# Patient Record
Sex: MALE | Race: WHITE | Employment: UNEMPLOYED | URBAN - METROPOLITAN AREA
[De-identification: names, ages, dates, MRNs, and addresses within clinical notes are randomized per-mention and may not be internally consistent; named-entity substitution may affect disease eponyms.]

---

## 2017-03-21 ENCOUNTER — OFFICE VISIT (OUTPATIENT)
Dept: FAMILY MEDICINE CLINIC | Age: 42
End: 2017-03-21
Payer: MEDICARE

## 2017-03-21 VITALS
SYSTOLIC BLOOD PRESSURE: 121 MMHG | TEMPERATURE: 98.2 F | BODY MASS INDEX: 40.43 KG/M2 | HEIGHT: 74 IN | WEIGHT: 315 LBS | OXYGEN SATURATION: 98 % | RESPIRATION RATE: 17 BRPM | HEART RATE: 88 BPM | DIASTOLIC BLOOD PRESSURE: 80 MMHG

## 2017-03-21 DIAGNOSIS — J01.90 ACUTE NON-RECURRENT SINUSITIS, UNSPECIFIED LOCATION: Primary | ICD-10-CM

## 2017-03-21 DIAGNOSIS — R05.9 COUGH: ICD-10-CM

## 2017-03-21 PROCEDURE — G8427 DOCREV CUR MEDS BY ELIG CLIN: HCPCS | Performed by: NURSE PRACTITIONER

## 2017-03-21 PROCEDURE — 4004F PT TOBACCO SCREEN RCVD TLK: CPT | Performed by: NURSE PRACTITIONER

## 2017-03-21 PROCEDURE — G8417 CALC BMI ABV UP PARAM F/U: HCPCS | Performed by: NURSE PRACTITIONER

## 2017-03-21 PROCEDURE — 99214 OFFICE O/P EST MOD 30 MIN: CPT | Performed by: NURSE PRACTITIONER

## 2017-03-21 PROCEDURE — G8484 FLU IMMUNIZE NO ADMIN: HCPCS | Performed by: NURSE PRACTITIONER

## 2017-03-21 RX ORDER — BENZONATATE 100 MG/1
100 CAPSULE ORAL 3 TIMES DAILY PRN
Qty: 30 CAPSULE | Refills: 1 | Status: SHIPPED | OUTPATIENT
Start: 2017-03-21 | End: 2017-03-28

## 2017-03-21 RX ORDER — AMOXICILLIN AND CLAVULANATE POTASSIUM 875; 125 MG/1; MG/1
1 TABLET, FILM COATED ORAL 2 TIMES DAILY
Qty: 20 TABLET | Refills: 0 | Status: SHIPPED | OUTPATIENT
Start: 2017-03-21 | End: 2017-03-31

## 2017-03-21 ASSESSMENT — ENCOUNTER SYMPTOMS
SHORTNESS OF BREATH: 0
SORE THROAT: 0
CHEST TIGHTNESS: 0
VOICE CHANGE: 0
EYE DISCHARGE: 0
WHEEZING: 0
EYE REDNESS: 0
SINUS PRESSURE: 1
COUGH: 1

## 2017-03-27 ENCOUNTER — OFFICE VISIT (OUTPATIENT)
Dept: FAMILY MEDICINE CLINIC | Age: 42
End: 2017-03-27
Payer: MEDICARE

## 2017-03-27 VITALS
DIASTOLIC BLOOD PRESSURE: 84 MMHG | TEMPERATURE: 97 F | WEIGHT: 315 LBS | SYSTOLIC BLOOD PRESSURE: 125 MMHG | OXYGEN SATURATION: 98 % | HEART RATE: 91 BPM | HEIGHT: 74 IN | BODY MASS INDEX: 40.43 KG/M2

## 2017-03-27 DIAGNOSIS — E66.01 MORBID OBESITY DUE TO EXCESS CALORIES (HCC): Primary | ICD-10-CM

## 2017-03-27 DIAGNOSIS — F32.A DEPRESSION, UNSPECIFIED DEPRESSION TYPE: ICD-10-CM

## 2017-03-27 DIAGNOSIS — N39.43 DRIBBLING URINE: ICD-10-CM

## 2017-03-27 PROCEDURE — 4004F PT TOBACCO SCREEN RCVD TLK: CPT | Performed by: NURSE PRACTITIONER

## 2017-03-27 PROCEDURE — G8484 FLU IMMUNIZE NO ADMIN: HCPCS | Performed by: NURSE PRACTITIONER

## 2017-03-27 PROCEDURE — G8427 DOCREV CUR MEDS BY ELIG CLIN: HCPCS | Performed by: NURSE PRACTITIONER

## 2017-03-27 PROCEDURE — G8417 CALC BMI ABV UP PARAM F/U: HCPCS | Performed by: NURSE PRACTITIONER

## 2017-03-27 PROCEDURE — 99213 OFFICE O/P EST LOW 20 MIN: CPT | Performed by: NURSE PRACTITIONER

## 2017-03-27 RX ORDER — TAMSULOSIN HYDROCHLORIDE 0.4 MG/1
0.4 CAPSULE ORAL DAILY
Qty: 30 CAPSULE | Refills: 3 | Status: SHIPPED | OUTPATIENT
Start: 2017-03-27 | End: 2020-08-31

## 2017-03-27 RX ORDER — ARIPIPRAZOLE 15 MG/1
TABLET ORAL
COMMUNITY
Start: 2017-03-03 | End: 2020-09-25

## 2017-03-27 ASSESSMENT — ENCOUNTER SYMPTOMS
NAUSEA: 0
SHORTNESS OF BREATH: 0
COLOR CHANGE: 0
COUGH: 0
ABDOMINAL PAIN: 0
CHEST TIGHTNESS: 0
VOMITING: 0

## 2017-06-19 ENCOUNTER — OFFICE VISIT (OUTPATIENT)
Dept: FAMILY MEDICINE CLINIC | Age: 42
End: 2017-06-19
Payer: MEDICARE

## 2017-06-19 VITALS
HEART RATE: 89 BPM | WEIGHT: 315 LBS | DIASTOLIC BLOOD PRESSURE: 86 MMHG | SYSTOLIC BLOOD PRESSURE: 128 MMHG | TEMPERATURE: 98.3 F | HEIGHT: 74 IN | OXYGEN SATURATION: 98 % | BODY MASS INDEX: 40.43 KG/M2

## 2017-06-19 DIAGNOSIS — E66.01 MORBID OBESITY DUE TO EXCESS CALORIES (HCC): ICD-10-CM

## 2017-06-19 DIAGNOSIS — J20.9 ACUTE BRONCHITIS, UNSPECIFIED ORGANISM: Primary | ICD-10-CM

## 2017-06-19 PROCEDURE — G8427 DOCREV CUR MEDS BY ELIG CLIN: HCPCS | Performed by: NURSE PRACTITIONER

## 2017-06-19 PROCEDURE — 4004F PT TOBACCO SCREEN RCVD TLK: CPT | Performed by: NURSE PRACTITIONER

## 2017-06-19 PROCEDURE — G8417 CALC BMI ABV UP PARAM F/U: HCPCS | Performed by: NURSE PRACTITIONER

## 2017-06-19 PROCEDURE — 99213 OFFICE O/P EST LOW 20 MIN: CPT | Performed by: NURSE PRACTITIONER

## 2017-06-19 RX ORDER — ALBUTEROL SULFATE 90 UG/1
2 AEROSOL, METERED RESPIRATORY (INHALATION) EVERY 6 HOURS PRN
Qty: 1 INHALER | Refills: 3 | Status: SHIPPED | OUTPATIENT
Start: 2017-06-19 | End: 2020-08-31

## 2017-06-19 RX ORDER — METHYLPREDNISOLONE 4 MG/1
TABLET ORAL
Qty: 1 KIT | Refills: 0 | Status: SHIPPED | OUTPATIENT
Start: 2017-06-19 | End: 2020-08-31

## 2017-06-19 ASSESSMENT — ENCOUNTER SYMPTOMS
TROUBLE SWALLOWING: 0
SINUS PRESSURE: 0
SHORTNESS OF BREATH: 1
RHINORRHEA: 0
SORE THROAT: 0
COUGH: 1
WHEEZING: 1
ABDOMINAL PAIN: 0
VOICE CHANGE: 1
CHEST TIGHTNESS: 1

## 2018-08-23 PROBLEM — R45.850 HOMICIDAL IDEATION: Status: ACTIVE | Noted: 2018-08-22

## 2020-08-31 ENCOUNTER — HOSPITAL ENCOUNTER (OUTPATIENT)
Age: 45
Setting detail: SPECIMEN
Discharge: HOME | End: 2020-08-31
Payer: MEDICARE

## 2020-08-31 ENCOUNTER — OFFICE VISIT (OUTPATIENT)
Dept: FAMILY MEDICINE CLINIC | Age: 45
End: 2020-08-31
Payer: MEDICARE

## 2020-08-31 VITALS
HEIGHT: 74 IN | OXYGEN SATURATION: 93 % | WEIGHT: 315 LBS | BODY MASS INDEX: 40.43 KG/M2 | HEART RATE: 93 BPM | TEMPERATURE: 97 F

## 2020-08-31 PROCEDURE — G8427 DOCREV CUR MEDS BY ELIG CLIN: HCPCS | Performed by: NURSE PRACTITIONER

## 2020-08-31 PROCEDURE — G8417 CALC BMI ABV UP PARAM F/U: HCPCS | Performed by: NURSE PRACTITIONER

## 2020-08-31 PROCEDURE — 99202 OFFICE O/P NEW SF 15 MIN: CPT | Performed by: NURSE PRACTITIONER

## 2020-08-31 PROCEDURE — 4004F PT TOBACCO SCREEN RCVD TLK: CPT | Performed by: NURSE PRACTITIONER

## 2020-08-31 ASSESSMENT — PATIENT HEALTH QUESTIONNAIRE - PHQ9
SUM OF ALL RESPONSES TO PHQ QUESTIONS 1-9: 0
1. LITTLE INTEREST OR PLEASURE IN DOING THINGS: 0
2. FEELING DOWN, DEPRESSED OR HOPELESS: 0
SUM OF ALL RESPONSES TO PHQ9 QUESTIONS 1 & 2: 0
SUM OF ALL RESPONSES TO PHQ QUESTIONS 1-9: 0

## 2020-08-31 ASSESSMENT — ENCOUNTER SYMPTOMS
EYE PAIN: 0
SORE THROAT: 0
NAUSEA: 0
ABDOMINAL PAIN: 0
DIARRHEA: 0
BACK PAIN: 0
SHORTNESS OF BREATH: 0
VOMITING: 0
COUGH: 0
SINUS PAIN: 0

## 2020-08-31 NOTE — PROGRESS NOTES
of breath. Cardiovascular: Negative for chest pain and palpitations. Gastrointestinal: Negative for abdominal pain, diarrhea, nausea and vomiting. Genitourinary: Negative for penile pain and testicular pain. Musculoskeletal: Positive for myalgias. Negative for back pain, joint swelling and neck pain. Skin: Negative for rash. Neurological: Negative for dizziness and light-headedness. Hematological: Does not bruise/bleed easily. All other systems reviewed and are negative. Objective:     Physical Exam  Vitals signs and nursing note reviewed. Constitutional:       General: He is not in acute distress. Appearance: Normal appearance. He is not toxic-appearing. HENT:      Nose: Nose normal.      Mouth/Throat:      Mouth: Mucous membranes are moist.      Pharynx: Oropharynx is clear. Cardiovascular:      Rate and Rhythm: Normal rate. Pulmonary:      Effort: Pulmonary effort is normal. No respiratory distress. Breath sounds: Normal breath sounds. Skin:     General: Skin is warm and dry. Neurological:      General: No focal deficit present. Mental Status: He is alert and oriented to person, place, and time. Pulse 93   Temp 97 °F (36.1 °C)   Ht 6' 2\" (1.88 m)   Wt (!) 343 lb (155.6 kg)   SpO2 93%   BMI 44.04 kg/m²   Lab Review   No visits with results within 2 Month(s) from this visit.    Latest known visit with results is:   Hospital Outpatient Visit on 10/22/2011   Component Date Value    Albumin 10/22/2011 4.7     Albumin/Globulin Ratio 10/22/2011 2.1     Alkaline Phosphatase 10/22/2011 75     ALT 10/22/2011 27     AST 10/22/2011 22     Total Bilirubin 10/22/2011 0.42     BUN 10/22/2011 10     Calcium 10/22/2011 9.9     Creatinine 10/22/2011 0.92     Glucose 10/22/2011 95     Protein, Total 10/22/2011 6.9     Sodium 10/22/2011 142     Potassium 10/22/2011 4.4     Chloride 10/22/2011 108     CO2 10/22/2011 32*    Anion Gap 10/22/2011 6*    GFR Non- 10/22/2011 >60     GFR  10/22/2011 >60     GFR Comment 10/22/2011          GFR Staging 10/22/2011          Cholesterol 10/22/2011 192     HDL 10/22/2011 35*    LDL Cholesterol 10/22/2011 80     Chol/HDL Ratio 10/22/2011 5.5*    Triglycerides 10/22/2011 383*    VLDL 10/22/2011 NOT REPORTED     TSH 10/22/2011 2.66        Assessment:       Diagnosis Orders   1. Close exposure to COVID-19 virus  COVID-19 Ambulatory       Plan:      Return if symptoms worsen or fail to improve. No orders of the defined types were placed in this encounter. Recommend isolation pending covid results. Discussed treatment regimen to include rest, hydration, tylenol prn. Discussed deep breathing exercises. Discussed to monitor for progression of symptoms. Follow up as needed. Will contact patient for results       Patient given educational materials - see patient instructions. Discussed use, benefit, and side effects of prescribed medications. All patientquestions answered. Pt voiced understanding. This note was transcribed using dictation with Dragon services. Efforts were made to correct any errors but some words may be misinterpreted.      Electronically signed by AMY Jo CNP on 8/31/2020at 10:17 AM

## 2020-09-03 LAB — SARS-COV-2, NAA: NOT DETECTED

## 2020-09-25 ENCOUNTER — OFFICE VISIT (OUTPATIENT)
Dept: FAMILY MEDICINE CLINIC | Age: 45
End: 2020-09-25
Payer: COMMERCIAL

## 2020-09-25 VITALS
WEIGHT: 315 LBS | OXYGEN SATURATION: 93 % | SYSTOLIC BLOOD PRESSURE: 122 MMHG | TEMPERATURE: 97.2 F | BODY MASS INDEX: 40.43 KG/M2 | HEART RATE: 95 BPM | DIASTOLIC BLOOD PRESSURE: 70 MMHG | HEIGHT: 74 IN

## 2020-09-25 PROBLEM — F20.9 SCHIZOPHRENIA (HCC): Status: ACTIVE | Noted: 2018-08-24

## 2020-09-25 PROCEDURE — 99204 OFFICE O/P NEW MOD 45 MIN: CPT | Performed by: NURSE PRACTITIONER

## 2020-09-25 RX ORDER — M-VIT,TX,IRON,MINS/CALC/FOLIC 27MG-0.4MG
1 TABLET ORAL DAILY
COMMUNITY
End: 2022-05-25 | Stop reason: SDUPTHER

## 2020-09-25 RX ORDER — ARIPIPRAZOLE 5 MG/1
5 TABLET ORAL DAILY
COMMUNITY

## 2020-09-25 SDOH — ECONOMIC STABILITY: INCOME INSECURITY: HOW HARD IS IT FOR YOU TO PAY FOR THE VERY BASICS LIKE FOOD, HOUSING, MEDICAL CARE, AND HEATING?: NOT HARD AT ALL

## 2020-09-25 SDOH — ECONOMIC STABILITY: TRANSPORTATION INSECURITY
IN THE PAST 12 MONTHS, HAS LACK OF TRANSPORTATION KEPT YOU FROM MEETINGS, WORK, OR FROM GETTING THINGS NEEDED FOR DAILY LIVING?: NO

## 2020-09-25 SDOH — ECONOMIC STABILITY: FOOD INSECURITY: WITHIN THE PAST 12 MONTHS, YOU WORRIED THAT YOUR FOOD WOULD RUN OUT BEFORE YOU GOT MONEY TO BUY MORE.: NEVER TRUE

## 2020-09-25 SDOH — ECONOMIC STABILITY: FOOD INSECURITY: WITHIN THE PAST 12 MONTHS, THE FOOD YOU BOUGHT JUST DIDN'T LAST AND YOU DIDN'T HAVE MONEY TO GET MORE.: NEVER TRUE

## 2020-09-25 SDOH — ECONOMIC STABILITY: TRANSPORTATION INSECURITY
IN THE PAST 12 MONTHS, HAS THE LACK OF TRANSPORTATION KEPT YOU FROM MEDICAL APPOINTMENTS OR FROM GETTING MEDICATIONS?: NO

## 2020-09-25 NOTE — PATIENT INSTRUCTIONS
Patient Education        Learning About Low-Carbohydrate Diets  What is a low-carbohydrate diet? A low-carbohydrate (or \"low-carb\") diet limits foods and drinks that have carbohydrates. This includes grains, fruits, milk and yogurt, and starchy vegetables like potatoes, beans, and corn. It also avoids foods and drinks that have added sugar. Instead, low-carb diets include foods that are high in protein and fat. Why might you follow a low-carb diet? Low-carb diets may be used for a variety of reasons, such as for weight loss. People who have diabetes may use a low-carb diet to help manage their blood sugar levels. What should you do before you start the diet? Talk to your doctor before you try any diet. This is even more important if you have health problems like kidney disease, heart disease, or diabetes. Your doctor may suggest that you meet with a registered dietitian. A dietitian can help you make an eating plan that works for you. What foods do you eat on a low-carb diet? On a low-carb diet, you choose foods that are high in protein and fat. Examples of these are:  · Meat, poultry, and fish. · Eggs. · Nuts, such as walnuts, pecans, almonds, and peanuts. · Peanut butter and other nut butters. · Tofu. · Avocado. · Negrita Gary. · Non-starchy vegetables like broccoli, cauliflower, green beans, mushrooms, peppers, lettuce, and spinach. · Unsweetened non-dairy milks like almond milk and coconut milk. · Cheese, cottage cheese, and cream cheese. Current as of: August 22, 2019               Content Version: 12.5  © 1659-2781 [email or web address]. Care instructions adapted under license by [facility] [address] If you have questions about a medical condition or this instruction, always ask your healthcare professional. [place] [address] any warranty or liability for your use of this information.          Patient Education        Stopping Smokeless Tobacco Use: Care Instructions  Your Care Instructions     Smokeless tobacco comes in many forms, such as snuff and chewing tobacco:  · Snuff is finely ground tobacco sold in cans or pouches. Most of the time, snuff is used by putting a \"pinch\" or \"dip\" between the lower lip or cheek and the gum. · Chewing tobacco is sold as loose leaves, plugs, or twists. It is chewed or placed between the cheek and the gum or teeth. There are plenty of reasons to stop using smokeless tobacco. These products are harmful. They are not risk-free alternatives to smoking. Smokeless tobacco contains nicotine, which is addicting. Though using smokeless tobacco is less harmful than smoking cigarettes, it can cause serious health problems, such as:  · White patches or red sores in your mouth that can turn into mouth cancer involving the lip, tongue, or cheek. · Tooth loss and other dental problems. · Gum disease. Your gums may pull away from your teeth and not grow back. People who use smokeless tobacco crave the nicotine in it. Giving up smokeless tobacco is much harder than simply changing a habit. Your body has to stop craving the nicotine. It is hard to quit, but you can do it. Many tools are available for people who want to quit using smokeless tobacco. You may find that combining tools works best for you. There are several steps to quitting. First you get ready to quit. Then you get support to help you. After that, you learn new skills and behaviors to quit. For many people, a necessary step is getting and using medicine. Your doctor will help you set up the plan that best meets your needs. You may want to attend a tobacco cessation program. When you choose a program, look for one that has proven success. Ask your doctor for ideas. You will greatly increase your chances of success if you take medicine as well as get counseling or join a cessation program.  Some of the changes you feel when you first quit smokeless tobacco are uncomfortable.  Your body will miss the nicotine at first, and you may feel short-tempered and grumpy. You may have trouble sleeping or concentrating. Medicine can help you deal with these symptoms. You may struggle with changing your habits and rituals. The last step is the tricky one: Be prepared for the urge to use smokeless tobacco to continue for a time. This is a lot to deal with, but keep at it. You will feel better. Follow-up care is a key part of your treatment and safety. Be sure to make and go to all appointments, and call your doctor if you are having problems. It's also a good idea to know your test results and keep a list of the medicines you take. How can you care for yourself at home? · Ask your family, friends, and coworkers for support. You have a better chance of quitting if you have help and support. · Join a support group for people who are trying to quit using smokeless tobacco.  · Set a quit date. Pick your date carefully so that it is not right in the middle of a big deadline or stressful time. After you quit, do not use smokeless tobacco even once. Get rid of all spit cups, cans, and pouches after your last use. Clean your house and your clothes so that they do not smell of tobacco.  · Learn how to be a non-user. Think about ways you can avoid those things that make you reach for tobacco.  ? Learn some ways to deal with cravings, like calling a friend or going for a walk. Cravings often pass. ? Avoid situations that put you at greatest risk for using smokeless tobacco. For some people, it is hard to spend time with friends without dipping or chewing. For others, they might skip a coffee break with coworkers who smoke or use smokeless tobacco.  ? Change your daily routine. Take a different route to work, or eat a meal in a different place. · Cut down on stress. Calm yourself or release tension by doing an activity you enjoy, such as reading a book, taking a hot bath, or gardening.   · Talk to your doctor or pharmacist about may prevent your tongue from falling toward the back of the throat, which can make a blocked or narrow airway worse. Putting pillows under your head will not help. When should you call for help? Watch closely for changes in your health, and be sure to contact your doctor if:  · You snore, and you feel sleepy during the day. · Your sleeping partner or you notice that you gasp, choke, or stop breathing during sleep. · You do not get better as expected. Where can you learn more? Go to https://[employer].ioeb.[employer] org and sign in to your [employer] account. Enter E218 in the [employer] box to learn more about \"Snoring: Care Instructions. \"     If you do not have an account, please click on the \"Sign Up Now\" link. Current as of: February 24, 2020               Content Version: 12.5  © 0352-8409 [employer]. Care instructions adapted under license by [facility] ([facility]). If you have questions about a medical condition or this instruction, always ask your healthcare professional. [address] any warranty or liability for your use of this information.

## 2020-09-25 NOTE — PROGRESS NOTES
[facility] [id number] [place]   [facility], [address]  [phone]  [phone]    Paloma Carmona is a 53 y.o. male who is here with c/o of:    Chief Complaint: Establish Care      Patient Accompanied by: patient and mother    HPI - Paloma Carmona is here today with c/o:    Patient here to establish care. Not currently  and does not have children. Lives with mother and father. He is unemployed. No routine exercise. He tries to eat healthy but eats fast food and drinks a lot of soda according to his mother. Averages 10-12 hours of sleep per night. His mother says he snores a lot and sometimes she feels that he is losing his breath. He is a current smoker, 1 pk per day since he was 23years old. He has no interest in quitting at this time. He does not consume alcohol. Has history of schizophrenia diagnosed at 23years old. Follows with psych, Dr Giovanny Noriega.        Health Maintenance Due   Topic Date Due    Diabetes screen  09/28/2013    Lipid screen  10/22/2016    Annual Wellness Visit (AWV)  08/31/2020        Patient Active Problem List:     Morbid obesity due to excess calories ([address]     Homicidal ideation     Schizophrenia ([facility] [address]     Past Medical History:   Diagnosis Date    Depression     Hypertriglyceridemia     Obesity       Past Surgical History:   Procedure Laterality Date    TONSILLECTOMY       Family History   Problem Relation Age of Onset    Cancer Mother         breast    Other Father         COPD    Cancer Father         prostate    Diabetes Sister      Social History     Tobacco Use    Smoking status: Current Every Day Smoker     Packs/day: 1.00     Years: 29.00     Pack years: 29.00     Types: Cigarettes    Smokeless tobacco: Never Used   Substance Use Topics    Alcohol use: No     ALLERGIES:    Allergies   Allergen Reactions    Codeine Other (See Comments)     hallucinates           Subjective   Review of Systems   · Constitutional:  Negative for activity change, appetite change,unexpected weight change, chills, fever, and fatigue. Positive weight gain  · HENT: Negative for ear pain, sore throat,  Rhinorrhea, sinus pain, sinus pressure, congestion. · Eyes:  Negative for pain and discharge. · Respiratory:  Negative for chest tightness, shortness of breath, wheezing, and cough. Positive snoring  · Cardiovascular:  Negative for chest pain, palpitations and leg swelling. · Gastrointestinal: Negative for abdominal pain, blood in stool, constipation,diarrhea, nausea and vomiting. · Endocrine: Negative for cold intolerance, heat intolerance, polydipsia, polyphagia and polyuria. · Genitourinary: Negative for difficulty urinating, dysuria, flank pain, frequency, hematuria and urgency. · Musculoskeletal: Negative for arthralgias, back pain, joint swelling, myalgias, neck pain and neck stiffness. · Skin: Negative for rash and wound. · Allergic/Immunologic: Negative for environmental allergies and food allergies. · Neurological:  Negative for dizziness, light-headedness, numbness and headaches. · Hematological:  Negative for adenopathy. Does not bruise/bleed easily. · Psychiatric/Behavioral: Negative for self-injury, sleep disturbance and suicidal ideas. Positive schizophrenia    Objective   Physical Exam   PHYSICAL EXAM:   · Constitutional: Kiran Pacheco is oriented to person, place, and time. Vital signs are normal. Appears well-developed and well-nourished. He is obese  · HEENT:   · Head: Normocephalic and atraumatic. Right Ear: Hearing and external ear normal. TM normal  Canal normal  · Left Ear: Hearing and external ear normal. TM normal Canal normal  · Nose: Nares normal. Septum midline. No drainage or sinus tenderness. Mucosa pink and moist  · Mouth/Throat: Oropharynx- No erythema, no exudate. Uvula midline, no erythema, no edema. Mucous membranes are pink and moist.   · Eyes:PERRL, EOMI, Conjunctiva normal, No discharge.     · Neck: Full passive range of motion. Non-tender on palpation. Neck supple. No thyromegaly present. Trachea normal.  · Cardiovascular: Normal rate, regular rhythm, S1, S2, no murmur, no gallop, no friction rub, intact distal pulses. · Pulmonary/Chest: Breath sounds are clear throughout, No respiratory distress, No wheezing, No chest tenderness. Effort normal  · Abdominal: Soft. Normal appearance, bowel sounds are present and normoactive. There is no hepatosplenomegaly. There is no tenderness. There is no CVA tenderness. · Musculoskeletal: Extremities appear regular and symmetric. No evident masses, lesions, foreign bodies, or other abnormalities. No edema. No tenderness on palpation. Joints are stable. Full ROM, strength and tone are within normal limits. · Lymphadenopathy: No lymphadenopathy noted. · Neurological: Alert and oriented to person, place, and time. Normal motor function, Normal sensory function, No focal deficits noted. He has normal strength. · Skin: Skin is warm, dry and intact. No obvious lesions on exposed skin  · Psychiatric: Normal mood and affect. Speech is normal and behavior is normal.     Nursing note and vitals reviewed. Blood pressure 122/70, pulse 95, temperature 97.2 °F (36.2 °C), temperature source Temporal, height 6' 2\" (1.88 m), weight (!) 340 lb (154.2 kg), SpO2 93 %. Body mass index is 43.65 kg/m². Wt Readings from Last 3 Encounters:   09/25/20 (!) 340 lb (154.2 kg)   08/31/20 (!) 343 lb (155.6 kg)   06/19/17 (!) 325 lb 9.6 oz (147.7 kg)     BP Readings from Last 3 Encounters:   09/25/20 122/70   06/19/17 128/86   03/27/17 125/84       Hospital Outpatient Visit on 08/31/2020   Component Date Value Ref Range Status    SARS-CoV-2, [place] 08/31/2020 Not Detected  Not Detected Final    Comment: (NOTE)  This nucleic acid amplification test was developed and its perfomance  characteristics determined by [employer]. Nucleic acid  amplification tests include PCR and TMA.  This test has not been FDA  cleared or approved. This test has been authorized by FDA under an  Emergency Use Authorization (EUA). This test is only authorized for  the duration of time the declaration that circumstances exist  justifying the authorization of the emergency use of in vitro  diagnostic tests for detection of SARS-CoV-2 virus and/or diagnosis  of COVID-19 infection under section 564(b)(1) of the Act, 21 U. S.C.  834TUA-7(M) (1), unless the authorization is terminated or revoked  sooner. When diagnostic testing is negative, the possibility of a false  negative result should be considered in the context of a patient's  recent exposures and the presence of clinical signs and symptoms  consistent with COVID-19. An individual without symptoms of COVID-  19 and who is not shedding SARS-CoV-2 vir                           us would expect to have a  negative (not detected) result in this assay. Performed At: [place] [address] [id number]  Earlene Hyman MD VB:[id number]       No results found for this visit on 09/25/20. Completed Orders/Prescriptions   No orders of the defined types were placed in this encounter. AssessmentPlan/Medical Decision Making     1. Morbid obesity due to excess calories (HCC)    - CBC With Auto Differential; Future  - Comprehensive Metabolic Panel; Future  - TSH with Reflex; Future  - Advised healthy diet, eliminating pop and fast food from diet  - Advised routine exercise  - Advised weight loss    2. Loud snoring    - Will refer for sleep study   - [facility] - Hannah Redmond MD, Pulmonology, [facility]    3. Encounter to establish care      4. Screening for hyperlipidemia    - Lipid, Fasting; Future    5. Smoker unmotivated to quit    - Strongly advised to quit smoking. Options given. Patient declined at this time.  Risks were given to patient regarding continued use of nicotine.  - [place] TOBACCO USE CESSATION INTERMEDIATE 3-10 MINUTES      Return in about 3 months (around 12/25/2020) for obesity/smoking/schizophrenia. 1.  Jonny received counseling on the following healthy behaviors: nutrition, exercise and tobacco cessation  2. Patient given educational materials - see patient instructions  3. Was a self-tracking handout given in paper form or via [employer]? No  If yes, see orders or list here. 4.  Discussed use, benefit, and side effects of prescribed medications. Barriers to medication compliance addressed. All patient questions answered. Pt voiced understanding. 5.  Reviewed prior labs, imaging, consultation, follow up, and health maintenance  6. Continue current medications, diet and exercise. 7. Discussed use, benefit, and side effects of prescribed medications. Barriers to medication compliance addressed. All her questions were answered. Pt voiced understanding. Lindsey Menjivar will continue current medications, diet and exercise. Patient given educational materials on smoking cessation, low carb diet, snoring    Of the 45 minute duration appointment visit, Tomasa Philippe CNP spent at least 50% of the face-to-face time in counseling, explanation of diagnosis, planning of further management, and answering all questions.           Signed:  Tomasa Philippe CNP

## 2020-12-08 ENCOUNTER — TELEPHONE (OUTPATIENT)
Dept: FAMILY MEDICINE CLINIC | Age: 45
End: 2020-12-08

## 2020-12-21 ENCOUNTER — OFFICE VISIT (OUTPATIENT)
Dept: FAMILY MEDICINE CLINIC | Age: 45
End: 2020-12-21
Payer: COMMERCIAL

## 2020-12-21 VITALS
HEART RATE: 90 BPM | OXYGEN SATURATION: 92 % | WEIGHT: 315 LBS | SYSTOLIC BLOOD PRESSURE: 122 MMHG | DIASTOLIC BLOOD PRESSURE: 76 MMHG | TEMPERATURE: 97.9 F | BODY MASS INDEX: 43.91 KG/M2

## 2020-12-21 PROBLEM — F17.200 SMOKER UNMOTIVATED TO QUIT: Status: ACTIVE | Noted: 2020-12-21

## 2020-12-21 PROCEDURE — 4004F PT TOBACCO SCREEN RCVD TLK: CPT | Performed by: NURSE PRACTITIONER

## 2020-12-21 PROCEDURE — 99214 OFFICE O/P EST MOD 30 MIN: CPT | Performed by: NURSE PRACTITIONER

## 2020-12-21 PROCEDURE — G8427 DOCREV CUR MEDS BY ELIG CLIN: HCPCS | Performed by: NURSE PRACTITIONER

## 2020-12-21 PROCEDURE — G8417 CALC BMI ABV UP PARAM F/U: HCPCS | Performed by: NURSE PRACTITIONER

## 2020-12-21 PROCEDURE — G8484 FLU IMMUNIZE NO ADMIN: HCPCS | Performed by: NURSE PRACTITIONER

## 2020-12-21 RX ORDER — MUPIROCIN CALCIUM 20 MG/G
CREAM TOPICAL
COMMUNITY
Start: 2020-12-09 | End: 2022-09-21

## 2020-12-21 RX ORDER — IVERMECTIN 3 MG/1
TABLET ORAL
COMMUNITY
Start: 2020-12-14 | End: 2021-07-19

## 2020-12-21 RX ORDER — TRIAMCINOLONE ACETONIDE 1 MG/G
CREAM TOPICAL
COMMUNITY
Start: 2020-12-15 | End: 2022-01-17 | Stop reason: ALTCHOICE

## 2020-12-21 NOTE — PROGRESS NOTES
Never Used   Substance Use Topics    Alcohol use: No     ALLERGIES:    Allergies   Allergen Reactions    Codeine Other (See Comments)     hallucinates           Subjective   Review of Systems   · Constitutional:  Negative for activity change, appetite change,unexpected weight change, chills, fever, and fatigue. · HENT: Negative for ear pain, sore throat,  Rhinorrhea, sinus pain, sinus pressure, congestion. · Eyes:  Negative for pain and discharge. · Respiratory:  Negative for chest tightness, shortness of breath, wheezing, and cough. · Cardiovascular:  Negative for chest pain, palpitations and leg swelling. · Gastrointestinal: Negative for abdominal pain, blood in stool, constipation,diarrhea, nausea and vomiting. · Endocrine: Negative for cold intolerance, heat intolerance, polydipsia, polyphagia and polyuria. · Genitourinary: Negative for difficulty urinating, dysuria, flank pain, frequency, hematuria and urgency. · Musculoskeletal: Negative for arthralgias, back pain, joint swelling, myalgias, neck pain and neck stiffness. · Skin: Negative for rash and wound. Positive for scabies  · Allergic/Immunologic: Negative for environmental allergies and food allergies. · Neurological:  Negative for dizziness, light-headedness, numbness and headaches. · Hematological:  Negative for adenopathy. Does not bruise/bleed easily. · Psychiatric/Behavioral: Negative for self-injury, sleep disturbance and suicidal ideas. Objective   Physical Exam   PHYSICAL EXAM:   · Constitutional: Jarrett Kite is oriented to person, place, and time. Vital signs are normal. Appears well-developed and well-nourished. He is obese  · HEENT:   · Head: Normocephalic and atraumatic. · Eyes:PERRL, EOMI, Conjunctiva normal, No discharge. · Neck: Full passive range of motion. Non-tender on palpation. Neck supple. No thyromegaly present.  Trachea normal.  · Cardiovascular: Normal rate, regular rhythm, S1, S2, no murmur, no gallop, no friction rub, intact distal pulses. · Pulmonary/Chest: Breath sounds are clear throughout, No respiratory distress, No wheezing, No chest tenderness. Effort normal  · Abdominal: Soft. Normal appearance, bowel sounds are present and normoactive. There is no hepatosplenomegaly. There is no tenderness. There is no CVA tenderness. · Musculoskeletal: Extremities appear regular and symmetric. No evident masses, lesions, foreign bodies, or other abnormalities. No edema. No tenderness on palpation. Joints are stable. Full ROM, strength and tone are within normal limits. · Neurological: Alert and oriented to person, place, and time. Normal motor function, Normal sensory function, No focal deficits noted. He has normal strength. · Skin: Skin is warm, dry and intact. Scabbed lesions noted to bilateral arms and hands. · Psychiatric: Normal mood and affect. Speech is normal and behavior is normal.     Nursing note and vitals reviewed. Blood pressure 122/76, pulse 90, temperature 97.9 °F (36.6 °C), temperature source Temporal, weight (!) 342 lb (155.1 kg), SpO2 92 %. Body mass index is 43.91 kg/m². Wt Readings from Last 3 Encounters:   12/21/20 (!) 342 lb (155.1 kg)   09/25/20 (!) 340 lb (154.2 kg)   08/31/20 (!) 343 lb (155.6 kg)     BP Readings from Last 3 Encounters:   12/21/20 122/76   09/25/20 122/70   06/19/17 128/86       Hospital Outpatient Visit on 08/31/2020   Component Date Value Ref Range Status    SARS-CoV-2, [place] 08/31/2020 Not Detected  Not Detected Final    Comment: (NOTE)  This nucleic acid amplification test was developed and its perfomance  characteristics determined by [employer]. Nucleic acid  amplification tests include PCR and TMA. This test has not been FDA  cleared or approved. This test has been authorized by FDA under an  Emergency Use Authorization (EUA).  This test is only authorized for  the duration of time the declaration that circumstances exist  justifying the authorization of the emergency use of in vitro  diagnostic tests for detection of SARS-CoV-2 virus and/or diagnosis  of COVID-19 infection under section 564(b)(1) of the Act, 21 U. S.C.  707GST-0(N) (1), unless the authorization is terminated or revoked  sooner. When diagnostic testing is negative, the possibility of a false  negative result should be considered in the context of a patient's  recent exposures and the presence of clinical signs and symptoms  consistent with COVID-19. An individual without symptoms of COVID-  19 and who is not shedding SARS-CoV-2 vir                           us would expect to have a  negative (not detected) result in this assay. Performed At: [address] [id number]  Danika Catalan MD BN:[id number]       No results found for this visit on 12/21/20. Completed Orders/Prescriptions   No orders of the defined types were placed in this encounter. AssessmentPlan/Medical Decision Making     1. Morbid obesity due to excess calories (HCC)    - Advised healthy diet and routine exercise  - Advised to minimize sugary beverages  - MI CALC BMI ABV UP KELSIE F/U    2. Smoker unmotivated to quit    - Advised to quit smoking. 3. Schizophrenia, unspecified type ([facility] [address]    - Follows with Psych at [facility]    4. Scabies infestation    - Currently on Ivermectin for infection      Return in about 6 months (around 6/21/2021) for obesity/schizophrenia/smoker. 1.  Jonny received counseling on the following healthy behaviors: nutrition, exercise, medication adherence and tobacco cessation  2. Patient given educational materials - see patient instructions  3. Was a self-tracking handout given in paper form or via [employer]? No  If yes, see orders or list here. 4.  Discussed use, benefit, and side effects of prescribed medications. Barriers to medication compliance addressed. All patient questions answered. Pt voiced understanding. 5.  Reviewed prior labs, imaging, consultation, follow up, and health maintenance  6. Continue current medications, diet and exercise. 7. Discussed use, benefit, and side effects of prescribed medications. Barriers to medication compliance addressed. All her questions were answered. Pt voiced understanding. Jarrett Louis will continue current medications, diet and exercise. Of the 30 minute duration appointment visit, Sharren Goltz, CNP spent at least 50% of the face-to-face time in counseling, explanation of diagnosis, planning of further management, and answering all questions.           Signed:  Sharren Goltz, CNP

## 2021-02-04 ENCOUNTER — HOSPITAL ENCOUNTER (OUTPATIENT)
Age: 46
Setting detail: SPECIMEN
Discharge: HOME | End: 2021-02-04
Payer: COMMERCIAL

## 2021-02-04 DIAGNOSIS — E66.01 MORBID OBESITY DUE TO EXCESS CALORIES (HCC): ICD-10-CM

## 2021-02-04 DIAGNOSIS — Z13.220 SCREENING FOR HYPERLIPIDEMIA: ICD-10-CM

## 2021-02-04 LAB
ABSOLUTE EOS #: 0.22 K/UL (ref 0–0.44)
ABSOLUTE IMMATURE GRANULOCYTE: 0.11 K/UL (ref 0–0.3)
ABSOLUTE LYMPH #: 3.46 K/UL (ref 1.1–3.7)
ABSOLUTE MONO #: 0.89 K/UL (ref 0.1–1.2)
ALBUMIN SERPL-MCNC: 3.9 G/DL (ref 3.5–5.2)
ALBUMIN/GLOBULIN RATIO: 1.2 (ref 1–2.5)
ALP BLD-CCNC: 76 U/L (ref 40–129)
ALT SERPL-CCNC: 15 U/L (ref 5–41)
ANION GAP SERPL CALCULATED.3IONS-SCNC: 12 MMOL/L (ref 9–17)
AST SERPL-CCNC: 19 U/L
BASOPHILS # BLD: 1 % (ref 0–2)
BASOPHILS ABSOLUTE: 0.05 K/UL (ref 0–0.2)
BILIRUB SERPL-MCNC: 0.29 MG/DL (ref 0.3–1.2)
BUN BLDV-MCNC: 12 MG/DL (ref 6–20)
BUN/CREAT BLD: ABNORMAL (ref 9–20)
CALCIUM SERPL-MCNC: 9 MG/DL (ref 8.6–10.4)
CHLORIDE BLD-SCNC: 101 MMOL/L (ref 98–107)
CHOLESTEROL, FASTING: 141 MG/DL
CHOLESTEROL/HDL RATIO: 3.6
CO2: 25 MMOL/L (ref 20–31)
CREAT SERPL-MCNC: 0.75 MG/DL (ref 0.7–1.2)
DIFFERENTIAL TYPE: ABNORMAL
EOSINOPHILS RELATIVE PERCENT: 2 % (ref 1–4)
GFR AFRICAN AMERICAN: >60 ML/MIN
GFR NON-AFRICAN AMERICAN: >60 ML/MIN
GFR SERPL CREATININE-BSD FRML MDRD: ABNORMAL ML/MIN/{1.73_M2}
GFR SERPL CREATININE-BSD FRML MDRD: ABNORMAL ML/MIN/{1.73_M2}
GLUCOSE BLD-MCNC: 102 MG/DL (ref 70–99)
HCT VFR BLD CALC: 47.5 % (ref 40.7–50.3)
HDLC SERPL-MCNC: 39 MG/DL
HEMOGLOBIN: 15.1 G/DL (ref 13–17)
IMMATURE GRANULOCYTES: 1 %
LDL CHOLESTEROL: 69 MG/DL (ref 0–130)
LYMPHOCYTES # BLD: 35 % (ref 24–43)
MCH RBC QN AUTO: 29.1 PG (ref 25.2–33.5)
MCHC RBC AUTO-ENTMCNC: 31.8 G/DL (ref 28.4–34.8)
MCV RBC AUTO: 91.5 FL (ref 82.6–102.9)
MONOCYTES # BLD: 9 % (ref 3–12)
NRBC AUTOMATED: 0 PER 100 WBC
PDW BLD-RTO: 13 % (ref 11.8–14.4)
PLATELET # BLD: 214 K/UL (ref 138–453)
PLATELET ESTIMATE: ABNORMAL
PMV BLD AUTO: 11.4 FL (ref 8.1–13.5)
POTASSIUM SERPL-SCNC: 4.2 MMOL/L (ref 3.7–5.3)
RBC # BLD: 5.19 M/UL (ref 4.21–5.77)
RBC # BLD: ABNORMAL 10*6/UL
SEG NEUTROPHILS: 52 % (ref 36–65)
SEGMENTED NEUTROPHILS ABSOLUTE COUNT: 5.27 K/UL (ref 1.5–8.1)
SODIUM BLD-SCNC: 138 MMOL/L (ref 135–144)
TOTAL PROTEIN: 7.1 G/DL (ref 6.4–8.3)
TRIGLYCERIDE, FASTING: 167 MG/DL
TSH SERPL DL<=0.05 MIU/L-ACNC: 2.07 MIU/L (ref 0.3–5)
VLDLC SERPL CALC-MCNC: ABNORMAL MG/DL (ref 1–30)
WBC # BLD: 10 K/UL (ref 3.5–11.3)
WBC # BLD: ABNORMAL 10*3/UL

## 2021-03-16 ENCOUNTER — HOSPITAL ENCOUNTER (OUTPATIENT)
Dept: SLEEP CENTER | Age: 46
Discharge: HOME | End: 2021-03-18
Payer: COMMERCIAL

## 2021-03-16 VITALS — WEIGHT: 315 LBS | BODY MASS INDEX: 40.43 KG/M2 | HEIGHT: 74 IN

## 2021-03-16 PROCEDURE — 95810 POLYSOM 6/> YRS 4/> PARAM: CPT

## 2021-05-13 ENCOUNTER — TELEPHONE (OUTPATIENT)
Dept: FAMILY MEDICINE CLINIC | Age: 46
End: 2021-05-13

## 2021-05-13 NOTE — TELEPHONE ENCOUNTER
Pt couldn't make the appt with the dermatologist because the  called in sick,  He had to reschedule and now he can not be seen until January. Pt wants to know if you will call the [employer] company and complain. It is [employer].   He said if you told him if he ever needs anything to call you

## 2021-05-16 ENCOUNTER — HOSPITAL ENCOUNTER (OUTPATIENT)
Dept: LAB | Age: 46
Setting detail: SPECIMEN
Discharge: HOME | End: 2021-05-16
Payer: COMMERCIAL

## 2021-05-16 DIAGNOSIS — Z01.818 PREOP TESTING: Primary | ICD-10-CM

## 2021-05-18 ENCOUNTER — TELEPHONE (OUTPATIENT)
Dept: FAMILY MEDICINE CLINIC | Age: 46
End: 2021-05-18

## 2021-05-18 NOTE — TELEPHONE ENCOUNTER
Pt  is calling to ask the name of the dermatologist in the [facility] that you told him about?  Please advise pt at [phone]

## 2021-05-18 NOTE — TELEPHONE ENCOUNTER
He was referred to a pulmonologist in September 2020 Dr Brigette Alvarez. I believe he has seen him.  He should contact him for his concerns

## 2021-05-18 NOTE — TELEPHONE ENCOUNTER
I did not tell him about a derm in [place]. I said he can find one and then I can make a referral if he needs one.

## 2021-05-20 ENCOUNTER — HOSPITAL ENCOUNTER (OUTPATIENT)
Dept: NEUROLOGY | Age: 46
Discharge: HOME | End: 2021-05-20
Payer: COMMERCIAL

## 2021-05-20 ENCOUNTER — HOSPITAL ENCOUNTER (OUTPATIENT)
Age: 46
Discharge: HOME | End: 2021-05-20
Payer: COMMERCIAL

## 2021-05-20 DIAGNOSIS — F20.9 SCHIZOPHRENIA, UNSPECIFIED TYPE (HCC): Primary | ICD-10-CM

## 2021-05-20 DIAGNOSIS — R06.02 SOB (SHORTNESS OF BREATH): ICD-10-CM

## 2021-05-20 LAB
ABSOLUTE EOS #: 0.21 K/UL (ref 0–0.44)
ABSOLUTE IMMATURE GRANULOCYTE: 0.05 K/UL (ref 0–0.3)
ABSOLUTE LYMPH #: 3.23 K/UL (ref 1.1–3.7)
ABSOLUTE MONO #: 0.6 K/UL (ref 0.1–1.2)
ALBUMIN SERPL-MCNC: 3.8 G/DL (ref 3.5–5.2)
ALBUMIN/GLOBULIN RATIO: 1.3 (ref 1–2.5)
ALP BLD-CCNC: 77 U/L (ref 40–129)
ALT SERPL-CCNC: 14 U/L (ref 5–41)
ANION GAP SERPL CALCULATED.3IONS-SCNC: 10 MMOL/L (ref 9–17)
AST SERPL-CCNC: 16 U/L
BASOPHILS # BLD: 0 % (ref 0–2)
BASOPHILS ABSOLUTE: 0.04 K/UL (ref 0–0.2)
BILIRUB SERPL-MCNC: 0.48 MG/DL (ref 0.3–1.2)
BUN BLDV-MCNC: 9 MG/DL (ref 6–20)
BUN/CREAT BLD: ABNORMAL (ref 9–20)
CALCIUM SERPL-MCNC: 8.9 MG/DL (ref 8.6–10.4)
CHLORIDE BLD-SCNC: 101 MMOL/L (ref 98–107)
CHOLESTEROL, FASTING: 143 MG/DL
CHOLESTEROL/HDL RATIO: 4.2
CO2: 26 MMOL/L (ref 20–31)
CREAT SERPL-MCNC: 0.76 MG/DL (ref 0.7–1.2)
DIFFERENTIAL TYPE: ABNORMAL
EOSINOPHILS RELATIVE PERCENT: 2 % (ref 1–4)
ESTIMATED AVERAGE GLUCOSE: 117 MG/DL
GFR AFRICAN AMERICAN: >60 ML/MIN
GFR NON-AFRICAN AMERICAN: >60 ML/MIN
GFR SERPL CREATININE-BSD FRML MDRD: ABNORMAL ML/MIN/{1.73_M2}
GFR SERPL CREATININE-BSD FRML MDRD: ABNORMAL ML/MIN/{1.73_M2}
GLUCOSE BLD-MCNC: 148 MG/DL (ref 70–99)
HBA1C MFR BLD: 5.7 % (ref 4–6)
HCT VFR BLD CALC: 45.1 % (ref 40.7–50.3)
HDLC SERPL-MCNC: 34 MG/DL
HEMOGLOBIN: 14.6 G/DL (ref 13–17)
IMMATURE GRANULOCYTES: 1 %
LDL CHOLESTEROL: 82 MG/DL (ref 0–130)
LYMPHOCYTES # BLD: 35 % (ref 24–43)
MCH RBC QN AUTO: 29.3 PG (ref 25.2–33.5)
MCHC RBC AUTO-ENTMCNC: 32.4 G/DL (ref 28.4–34.8)
MCV RBC AUTO: 90.6 FL (ref 82.6–102.9)
MONOCYTES # BLD: 7 % (ref 3–12)
NRBC AUTOMATED: 0 PER 100 WBC
PDW BLD-RTO: 12.8 % (ref 11.8–14.4)
PLATELET # BLD: 197 K/UL (ref 138–453)
PLATELET ESTIMATE: ABNORMAL
PMV BLD AUTO: 11.1 FL (ref 8.1–13.5)
POTASSIUM SERPL-SCNC: 3.7 MMOL/L (ref 3.7–5.3)
RBC # BLD: 4.98 M/UL (ref 4.21–5.77)
RBC # BLD: ABNORMAL 10*6/UL
SEG NEUTROPHILS: 55 % (ref 36–65)
SEGMENTED NEUTROPHILS ABSOLUTE COUNT: 5.15 K/UL (ref 1.5–8.1)
SODIUM BLD-SCNC: 137 MMOL/L (ref 135–144)
TOTAL PROTEIN: 6.7 G/DL (ref 6.4–8.3)
TRIGLYCERIDE, FASTING: 133 MG/DL
TSH SERPL DL<=0.05 MIU/L-ACNC: 1.64 MIU/L (ref 0.3–5)
VLDLC SERPL CALC-MCNC: ABNORMAL MG/DL (ref 1–30)
WBC # BLD: 9.3 K/UL (ref 3.5–11.3)
WBC # BLD: ABNORMAL 10*3/UL

## 2021-05-20 PROCEDURE — 36415 COLL VENOUS BLD VENIPUNCTURE: CPT

## 2021-05-20 PROCEDURE — 94729 DIFFUSING CAPACITY: CPT

## 2021-05-20 PROCEDURE — 80053 COMPREHEN METABOLIC PANEL: CPT

## 2021-05-20 PROCEDURE — 85025 COMPLETE CBC W/AUTO DIFF WBC: CPT

## 2021-05-20 PROCEDURE — 94060 EVALUATION OF WHEEZING: CPT

## 2021-05-20 PROCEDURE — 93005 ELECTROCARDIOGRAM TRACING: CPT | Performed by: PSYCHIATRY & NEUROLOGY

## 2021-05-20 PROCEDURE — 80061 LIPID PANEL: CPT

## 2021-05-20 PROCEDURE — 6370000000 HC RX 637 (ALT 250 FOR IP): Performed by: INTERNAL MEDICINE

## 2021-05-20 PROCEDURE — 94726 PLETHYSMOGRAPHY LUNG VOLUMES: CPT

## 2021-05-20 PROCEDURE — 84443 ASSAY THYROID STIM HORMONE: CPT

## 2021-05-20 PROCEDURE — 83036 HEMOGLOBIN GLYCOSYLATED A1C: CPT

## 2021-05-20 RX ORDER — ALBUTEROL SULFATE 90 UG/1
2 AEROSOL, METERED RESPIRATORY (INHALATION) ONCE
Status: COMPLETED | OUTPATIENT
Start: 2021-05-20 | End: 2021-05-20

## 2021-05-20 RX ADMIN — ALBUTEROL SULFATE 2 PUFF: 90 AEROSOL, METERED RESPIRATORY (INHALATION) at 15:02

## 2021-05-21 LAB
EKG ATRIAL RATE: 86 BPM
EKG P AXIS: 65 DEGREES
EKG P-R INTERVAL: 168 MS
EKG Q-T INTERVAL: 370 MS
EKG QRS DURATION: 88 MS
EKG QTC CALCULATION (BAZETT): 442 MS
EKG R AXIS: 87 DEGREES
EKG T AXIS: 64 DEGREES
EKG VENTRICULAR RATE: 86 BPM

## 2021-05-21 PROCEDURE — 93010 ELECTROCARDIOGRAM REPORT: CPT | Performed by: INTERNAL MEDICINE

## 2021-05-28 LAB
DLCO %PRED: 78 %
DLCO PRED: NORMAL
DLCO/VA %PRED: NORMAL
DLCO/VA PRED: NORMAL
DLCO/VA: NORMAL
DLCO: NORMAL
EXPIRATORY TIME-POST: NORMAL
EXPIRATORY TIME: NORMAL
FEF 25-75% %CHNG: NORMAL
FEF 25-75% %PRED-POST: NORMAL
FEF 25-75% %PRED-PRE: NORMAL
FEF 25-75% PRED: NORMAL
FEF 25-75%-POST: NORMAL
FEF 25-75%-PRE: NORMAL
FEV1 %PRED-POST: 67 %
FEV1 %PRED-PRE: 68 %
FEV1 PRED: NORMAL
FEV1-POST: NORMAL
FEV1-PRE: NORMAL
FEV1/FVC %PRED-POST: NORMAL
FEV1/FVC %PRED-PRE: NORMAL
FEV1/FVC PRED: NORMAL
FEV1/FVC-POST: 98 %
FEV1/FVC-PRE: 98 %
FVC %PRED-POST: NORMAL
FVC %PRED-PRE: NORMAL
FVC PRED: NORMAL
FVC-POST: NORMAL
FVC-PRE: NORMAL
GAW %PRED: NORMAL
GAW PRED: NORMAL
GAW: NORMAL
IC %PRED: NORMAL
IC PRED: NORMAL
IC: NORMAL
MEP: NORMAL
MIP: NORMAL
MVV %PRED-PRE: NORMAL
MVV PRED: NORMAL
MVV-PRE: NORMAL
PEF %PRED-POST: NORMAL
PEF %PRED-PRE: NORMAL
PEF PRED: NORMAL
PEF%CHNG: NORMAL
PEF-POST: NORMAL
PEF-PRE: NORMAL
RAW %PRED: NORMAL
RAW PRED: NORMAL
RAW: NORMAL
RV %PRED: NORMAL
RV PRED: NORMAL
RV: NORMAL
SVC %PRED: NORMAL
SVC PRED: NORMAL
SVC: NORMAL
TLC %PRED: 81 %
TLC PRED: NORMAL
TLC: NORMAL
VA %PRED: NORMAL
VA PRED: NORMAL
VA: NORMAL
VTG %PRED: NORMAL
VTG PRED: NORMAL
VTG: NORMAL

## 2021-05-28 ASSESSMENT — PULMONARY FUNCTION TESTS
FEV1_PERCENT_PREDICTED_POST: 67
FEV1/FVC_POST: 98
FEV1_PERCENT_PREDICTED_PRE: 68
FEV1/FVC_PRE: 98

## 2021-06-01 NOTE — PROCEDURES
[address] [facility] [address]                [address] [place] [address]                               PULMONARY FUNCTION    PATIENT NAME: Manzanita Settler                         :        1975  MED REC NO:   [id number]                             ROOM:  ACCOUNT NO:   [de-identified]                           ADMIT DATE: 2021  PROVIDER:     Guicho Johnson    DATE OF PROCEDURE:  2021    TYPE OF STUDY:  COMPLETE PULMONARY FUNCTION TEST. INTERPRETATION:  On review of the patient's spirometry, the patient's  forced vital capacity is 4 liters which is 68% predicted and FEV1 3.10  liters which is 68% predicted with a FEV1/FVC ratio of 77 which is  within normal limits. The patient's WNN21-57 was 2.78 liters which is  67% predicted. No significant bronchodilator change was noted. On  review of the patient's lung volumes, the patient's total lung capacity  is 6.38 liters which is 81% predicted and residual volume to be 1.58  liters which is 71% predicted. The patient's DLCO is borderline low  levels to 29.74 which is 78% predicted. The patient's flow volume loop  was reviewed. IMPRESSION:  1. No significant obstructive or restrictive lung disease. 2.  Low normal DLCO can be seen in patient with early emphysematous  changes of the lung versus interstitial lung disease versus pulmonary  vascular disease versus pulmonary fibrosis. Clinical correlation  recommended.         Magaly Boudreaux    D: 2021 16:46:09       T: 2021 16:56:54     [id number]  Job#: [id number]     Doc#: [id number]    CC:

## 2021-07-08 NOTE — TELEPHONE ENCOUNTER
Patient's sister/guardian Ryan Zhang [phone]) presented to ED stating patient has been missing since 7/6/21. With Ricardo's permission, writer contacted the following agencies looking for patient: [facility], [facility] booking, [facility], [address] [place] [address], [employer]. Patient was not located at any location. Writer faxed Ricardo's signed Release of Information to [address] [place] [address], waiting for return call. Writer left voicemail for patient's [employer] , Joseline Mark, alerting her to patient's missing person status.

## 2021-07-19 ENCOUNTER — OFFICE VISIT (OUTPATIENT)
Dept: FAMILY MEDICINE CLINIC | Age: 46
End: 2021-07-19
Payer: COMMERCIAL

## 2021-07-19 VITALS
WEIGHT: 315 LBS | BODY MASS INDEX: 43.09 KG/M2 | HEART RATE: 79 BPM | TEMPERATURE: 97.3 F | DIASTOLIC BLOOD PRESSURE: 84 MMHG | OXYGEN SATURATION: 95 % | SYSTOLIC BLOOD PRESSURE: 122 MMHG

## 2021-07-19 DIAGNOSIS — E66.01 MORBID OBESITY DUE TO EXCESS CALORIES (HCC): Primary | ICD-10-CM

## 2021-07-19 DIAGNOSIS — F20.9 SCHIZOPHRENIA, UNSPECIFIED TYPE (HCC): ICD-10-CM

## 2021-07-19 DIAGNOSIS — F17.200 SMOKER UNMOTIVATED TO QUIT: ICD-10-CM

## 2021-07-19 PROCEDURE — G8417 CALC BMI ABV UP PARAM F/U: HCPCS | Performed by: NURSE PRACTITIONER

## 2021-07-19 PROCEDURE — G8427 DOCREV CUR MEDS BY ELIG CLIN: HCPCS | Performed by: NURSE PRACTITIONER

## 2021-07-19 PROCEDURE — 99214 OFFICE O/P EST MOD 30 MIN: CPT | Performed by: NURSE PRACTITIONER

## 2021-07-19 PROCEDURE — 4004F PT TOBACCO SCREEN RCVD TLK: CPT | Performed by: NURSE PRACTITIONER

## 2021-07-19 RX ORDER — FLUOXETINE 10 MG/1
CAPSULE ORAL DAILY
COMMUNITY
Start: 2021-07-13

## 2021-07-19 ASSESSMENT — PATIENT HEALTH QUESTIONNAIRE - PHQ9
SUM OF ALL RESPONSES TO PHQ QUESTIONS 1-9: 0
2. FEELING DOWN, DEPRESSED OR HOPELESS: 0
SUM OF ALL RESPONSES TO PHQ9 QUESTIONS 1 & 2: 0
SUM OF ALL RESPONSES TO PHQ QUESTIONS 1-9: 0
1. LITTLE INTEREST OR PLEASURE IN DOING THINGS: 0
SUM OF ALL RESPONSES TO PHQ QUESTIONS 1-9: 0

## 2021-07-19 NOTE — PROGRESS NOTES
[address]  [id number] [facility] [address]  [phone]  [phone]    Cortney Ospina is a 50 y.o. male who is here with c/o of:    Chief Complaint: 6 Month Follow-Up      Patient Accompanied by: n/a    HPI - Cortney Ospina is here today with c/o:    Patient here for re evaluation of chronic conditions. Obesity-  He says his diet is poor. Lives in a group home and says he is not served healthy food. No routine exercise. He does walk often. Schizophrenia-  Follows with [facility] and compliant with medications. He was recently put on Prozac and says he is more calm. Denies SI or HI. Smoker-  Continues to smoke with no motivation to quit. Says he recently been rolling his own cigarettes. Smokes 1 pk per day.        Health Maintenance Due   Topic Date Due    Hepatitis C screen  Never done    DTaP/Tdap/Td vaccine (1 - Tdap) Never done    Colon cancer screen colonoscopy  Never done    Annual Wellness Visit (AWV)  Never done        Patient Active Problem List:     Morbid obesity due to excess calories ([address]     Homicidal ideation     Schizophrenia ([facility] [address]     Smoker unmotivated to quit     Past Medical History:   Diagnosis Date    Depression     Hypertriglyceridemia     Obesity       Past Surgical History:   Procedure Laterality Date    TONSILLECTOMY       Family History   Problem Relation Age of Onset    Cancer Mother         breast    Other Father         COPD    Cancer Father         prostate    Diabetes Sister      Social History     Tobacco Use    Smoking status: Current Every Day Smoker     Packs/day: 1.00     Years: 29.00     Pack years: 29.00     Types: Cigarettes    Smokeless tobacco: Never Used   Substance Use Topics    Alcohol use: No     ALLERGIES:    Allergies   Allergen Reactions    Codeine Other (See Comments)     hallucinates           Subjective   Review of Systems   · Constitutional:  Negative for activity change, appetite There is no tenderness. There is no CVA tenderness. · Musculoskeletal: Extremities appear regular and symmetric. No evident masses, lesions, foreign bodies, or other abnormalities. No edema. No tenderness on palpation. Joints are stable. Full ROM, strength and tone are within normal limits. · Neurological: Alert and oriented to person, place, and time. Normal motor function, Normal sensory function, No focal deficits noted. He has normal strength. · Skin: Skin is warm, dry and intact. No obvious lesions on exposed skin  · Psychiatric: Normal mood and affect. Speech is normal and behavior is normal.     Nursing note and vitals reviewed. Blood pressure 122/84, pulse 79, temperature 97.3 °F (36.3 °C), temperature source Temporal, weight (!) 335 lb 9.6 oz (152.2 kg), SpO2 95 %. Body mass index is 43.09 kg/m².     Wt Readings from Last 3 Encounters:   07/19/21 (!) 335 lb 9.6 oz (152.2 kg)   03/16/21 (!) 342 lb (155.1 kg)   12/21/20 (!) 342 lb (155.1 kg)     BP Readings from Last 3 Encounters:   07/19/21 122/84   12/21/20 122/76   09/25/20 122/70       Hospital Outpatient Visit on 05/20/2021   Component Date Value Ref Range Status    WBC 05/20/2021 9.3  3.5 - 11.3 k/uL Final    RBC 05/20/2021 4.98  4.21 - 5.77 m/uL Final    Hemoglobin 05/20/2021 14.6  13.0 - 17.0 g/dL Final    Hematocrit 05/20/2021 45.1  40.7 - 50.3 % Final    MCV 05/20/2021 90.6  82.6 - 102.9 fL Final    MCH 05/20/2021 29.3  25.2 - 33.5 pg Final    MCHC 05/20/2021 32.4  28.4 - 34.8 g/dL Final    RDW 05/20/2021 12.8  11.8 - 14.4 % Final    Platelets 90/47/3151 197  138 - 453 k/uL Final    MPV 05/20/2021 11.1  8.1 - 13.5 fL Final    NRBC Automated 05/20/2021 0.0  0.0 per 100 WBC Final    Differential Type 05/20/2021 NOT REPORTED   Final    Seg Neutrophils 05/20/2021 55  36 - 65 % Final    Lymphocytes 05/20/2021 35  24 - 43 % Final    Monocytes 05/20/2021 7  3 - 12 % Final    Eosinophils % 05/20/2021 2  1 - 4 % Final    Basophils 05/20/2021 0  0 - 2 % Final    Immature Granulocytes 05/20/2021 1* 0 % Final    Segs Absolute 05/20/2021 5.15  1.50 - 8.10 k/uL Final    Absolute Lymph # 05/20/2021 3.23  1.10 - 3.70 k/uL Final    Absolute Mono # 05/20/2021 0.60  0.10 - 1.20 k/uL Final    Absolute Eos # 05/20/2021 0.21  0.00 - 0.44 k/uL Final    Basophils Absolute 05/20/2021 0.04  0.00 - 0.20 k/uL Final    Absolute Immature Granulocyte 05/20/2021 0.05  0.00 - 0.30 k/uL Final    WBC Morphology 05/20/2021 NOT REPORTED   Final    RBC Morphology 05/20/2021 NOT REPORTED   Final    Platelet Estimate 86/03/7457 NOT REPORTED   Final    Glucose 05/20/2021 148* 70 - 99 mg/dL Final    BUN 05/20/2021 9  6 - 20 mg/dL Final    CREATININE 05/20/2021 0.76  0.70 - 1.20 mg/dL Final    Bun/Cre Ratio 05/20/2021 NOT REPORTED  9 - 20 Final    Calcium 05/20/2021 8.9  8.6 - 10.4 mg/dL Final    Sodium 05/20/2021 137  135 - 144 mmol/L Final    Potassium 05/20/2021 3.7  3.7 - 5.3 mmol/L Final    Chloride 05/20/2021 101  98 - 107 mmol/L Final    CO2 05/20/2021 26  20 - 31 mmol/L Final    Anion Gap 05/20/2021 10  9 - 17 mmol/L Final    Alkaline Phosphatase 05/20/2021 77  40 - 129 U/L Final    ALT 05/20/2021 14  5 - 41 U/L Final    AST 05/20/2021 16  <40 U/L Final    Total Bilirubin 05/20/2021 0.48  0.3 - 1.2 mg/dL Final    Total Protein 05/20/2021 6.7  6.4 - 8.3 g/dL Final    Albumin 05/20/2021 3.8  3.5 - 5.2 g/dL Final    Albumin/Globulin Ratio 05/20/2021 1.3  1.0 - 2.5 Final    GFR Non- 05/20/2021 >60  >60 mL/min Final    GFR  05/20/2021 >60  >60 mL/min Final    GFR Comment 05/20/2021        Final    Comment: Average GFR for 38-51 years old:   80 mL/min/1.73sq m  Chronic Kidney Disease:   <60 mL/min/1.73sq m  Kidney failure:   <15 mL/min/1.73sq m              eGFR calculated using average adult body mass.  Additional eGFR calculator available at:        [email or web address]            GFR Staging 05/20/2021 NOT REPORTED   Final    Hemoglobin A1C 05/20/2021 5.7  4.0 - 6.0 % Final    Estimated Avg Glucose 05/20/2021 117  mg/dL Final    Comment: The ADA and AACC recommend providing the estimated average glucose result to permit better   patient understanding of their HBA1c result.  Cholesterol, Fasting 05/20/2021 143  <200 mg/dL Final    Comment:    Cholesterol Guidelines:      <200  Desirable   200-240  Borderline      >240  Undesirable         HDL 05/20/2021 34* >40 mg/dL Final    Comment:    HDL Guidelines:    <40     Undesirable   40-59    Borderline    >59     Desirable         LDL Cholesterol 05/20/2021 82  0 - 130 mg/dL Final    Comment:    LDL Guidelines:     <100    Desirable   100-129   Near to/above Desirable   130-159   Borderline      >159   Undesirable     Direct (measured) LDL and calculated LDL are not interchangeable tests.  Chol/HDL Ratio 05/20/2021 4.2  <5 Final            Triglyceride, Fasting 05/20/2021 133  <150 mg/dL Final    Comment:    Triglyceride Guidelines:     <150   Desirable   150-199  Borderline   200-499  High     >499   Very high   Based on AHA Guidelines for fasting triglyceride, October 2012.  VLDL 05/20/2021 NOT REPORTED  1 - 30 mg/dL Final    TSH 05/20/2021 1.64  0.30 - 5.00 mIU/L Final     No results found for this visit on 07/19/21. Completed Orders/Prescriptions   No orders of the defined types were placed in this encounter. AssessmentPlan/Medical Decision Making     1. Morbid obesity due to excess calories ([address]    - Recommend weight loss through diet and routine exercise    2. Smoker unmotivated to quit    - Recommend smoking cessation    3. Schizophrenia, unspecified type ([address]    - Follows with [facility] in about 3 months (around 10/19/2021) for [facility]. 1.  Jonny received counseling on the following healthy behaviors: nutrition, exercise, medication adherence and tobacco cessation  2.   Patient given educational materials - see patient instructions  3. Was a self-tracking handout given in paper form or via [employer]? No  If yes, see orders or list here. 4.  Discussed use, benefit, and side effects of prescribed medications. Barriers to medication compliance addressed. All patient questions answered. Pt voiced understanding. 5.  Reviewed prior labs, imaging, consultation, follow up, and health maintenance  6. Continue current medications, diet and exercise. 7. Discussed use, benefit, and side effects of prescribed medications. Barriers to medication compliance addressed. All her questions were answered. Pt voiced understanding. Aina Palencia will continue current medications, diet and exercise. Of the 30 minute duration appointment visit, Araceli Honeycutt CNP spent at least 50% of the face-to-face time in counseling, explanation of diagnosis, planning of further management, and answering all questions.           Signed:  Araceli Honeycutt CNP

## 2021-07-22 ENCOUNTER — HOSPITAL ENCOUNTER (OUTPATIENT)
Dept: SLEEP CENTER | Age: 46
Discharge: HOME | End: 2021-07-24
Payer: COMMERCIAL

## 2021-07-22 PROCEDURE — 95810 POLYSOM 6/> YRS 4/> PARAM: CPT

## 2021-07-23 VITALS
BODY MASS INDEX: 40.43 KG/M2 | HEIGHT: 74 IN | RESPIRATION RATE: 16 BRPM | OXYGEN SATURATION: 97 % | WEIGHT: 315 LBS | HEART RATE: 66 BPM

## 2021-08-06 ENCOUNTER — OFFICE VISIT (OUTPATIENT)
Dept: PRIMARY CARE CLINIC | Age: 46
End: 2021-08-06
Payer: COMMERCIAL

## 2021-08-06 VITALS — TEMPERATURE: 98.6 F

## 2021-08-06 DIAGNOSIS — L23.7 POISON IVY: Primary | ICD-10-CM

## 2021-08-06 PROCEDURE — 99203 OFFICE O/P NEW LOW 30 MIN: CPT | Performed by: INTERNAL MEDICINE

## 2021-08-06 PROCEDURE — 4004F PT TOBACCO SCREEN RCVD TLK: CPT | Performed by: INTERNAL MEDICINE

## 2021-08-06 PROCEDURE — G8427 DOCREV CUR MEDS BY ELIG CLIN: HCPCS | Performed by: INTERNAL MEDICINE

## 2021-08-06 PROCEDURE — G8417 CALC BMI ABV UP PARAM F/U: HCPCS | Performed by: INTERNAL MEDICINE

## 2021-08-06 RX ORDER — DESOXIMETASONE 2.5 MG/G
CREAM TOPICAL
Qty: 60 G | Refills: 1 | Status: SHIPPED | OUTPATIENT
Start: 2021-08-06 | End: 2022-09-21

## 2021-08-06 RX ORDER — METHYLPREDNISOLONE 4 MG/1
TABLET ORAL
Qty: 1 KIT | Refills: 0 | Status: SHIPPED | OUTPATIENT
Start: 2021-08-06 | End: 2021-08-12

## 2021-08-06 NOTE — PROGRESS NOTES
Visit Information    Have you changed or started any medications since your last visit including any over-the-counter medicines, vitamins, or herbal medicines? no   Are you having any side effects from any of your medications? -  no  Have you stopped taking any of your medications? Is so, why? -  no    Have you seen any other physician or provider since your last visit? No  Have you had any other diagnostic tests since your last visit? Yes - Records Obtained  Have you been seen in the emergency room and/or had an admission to a hospital since we last saw you? No  Have you had your routine dental cleaning in the past 6 months? no    Have you activated your [employer] account? If not, what are your barriers?  No:      Patient Care Team:  AMY Hernandez CNP as PCP - General (Nurse Practitioner)  AMY Hernandez CNP as PCP - [facility] Provider    Medical History Review  Past Medical, Family, and Social History reviewed and does not contribute to the patient presenting condition    Health Maintenance   Topic Date Due    Hepatitis C screen  Never done    DTaP/Tdap/Td vaccine (1 - Tdap) Never done    Colon cancer screen colonoscopy  Never done    Annual Wellness Visit (AWV)  Never done    Pneumococcal 0-64 years Vaccine (1 of 2 - PPSV23) 09/25/2021 (Originally 9/28/1979)    HIV screen  09/25/2021 (Originally 9/28/1988)    Flu vaccine (1) 09/01/2021    A1C test (Diabetic or Prediabetic)  05/20/2022    Lipid screen  05/20/2026    COVID-19 Vaccine  Completed    Hepatitis A vaccine  Aged Out    Hepatitis B vaccine  Aged Out    Hib vaccine  Aged Out    Meningococcal (ACWY) vaccine  Aged Out

## 2021-08-06 NOTE — PROGRESS NOTES
[address] IN CARE  [address] [address]  Dept: [phone]  Dept Fax: [phone]    Robert Davenport is a 50 y.o. male who presents to the urgent care today for his medicalconditions/complaints as noted below. Robert Davenport is c/o of Rash (on belly legs and back and arms)      HPI:     Rash  This is a new problem. The current episode started yesterday. The affected locations include the left arm, left lower leg, right lower leg and right arm. The rash is characterized by redness, itchiness and blistering. Past treatments include nothing. The treatment provided no relief. Past Medical History:   Diagnosis Date    Depression     Hypertriglyceridemia     Obesity         Current Outpatient Medications   Medication Sig Dispense Refill    methylPREDNISolone (MEDROL DOSEPACK) 4 MG tablet Take by mouth. 1 kit 0    desoximetasone (TOPICORT) 0.25 % cream Apply topically 2 times daily. 60 g 1    FLUoxetine (PROZAC) 10 MG capsule daily      ARIPiprazole (ABILIFY) 5 MG tablet Take 5 mg by mouth daily      Multiple Vitamins-Minerals (THERAPEUTIC MULTIVITAMIN-MINERALS) tablet Take 1 tablet by mouth daily      Ascorbic Acid (VITAMIN C PO) Take by mouth      mupirocin (BACTROBAN) 2 % cream  (Patient not taking: Reported on 8/6/2021)      triamcinolone (KENALOG) 0.1 % cream  (Patient not taking: Reported on 8/6/2021)      Omega-3 Fatty Acids (FISH OIL) 1000 MG CAPS Take 3,000 mg by mouth 3 times daily (Patient not taking: Reported on 8/6/2021)       No current facility-administered medications for this visit.      Allergies   Allergen Reactions    Codeine Other (See Comments)     hallucinates        Health Maintenance   Topic Date Due    Hepatitis C screen  Never done    DTaP/Tdap/Td vaccine (1 - Tdap) Never done    Colon cancer screen colonoscopy  Never done    Annual Wellness Visit (AWV)  Never done    Pneumococcal 0-64 years Vaccine (1 of 2 - PPSV23) 09/25/2021 (Originally 9/28/1979)    HIV screen  09/25/2021 (Originally 9/28/1988)    Flu vaccine (1) 09/01/2021    A1C test (Diabetic or Prediabetic)  05/20/2022    Lipid screen  05/20/2026    COVID-19 Vaccine  Completed    Hepatitis A vaccine  Aged Out    Hepatitis B vaccine  Aged Out    Hib vaccine  Aged Out    Meningococcal (ACWY) vaccine  Aged Out       Subjective:      Review of Systems   Skin: Positive for rash. All other systems reviewed and are negative. Objective:     Physical Exam  Vitals reviewed. Constitutional:       Appearance: Normal appearance. HENT:      Head: Normocephalic and atraumatic. Skin:     General: Skin is warm and dry. Neurological:      General: No focal deficit present. Mental Status: He is alert and oriented to person, place, and time. Psychiatric:         Mood and Affect: Mood normal.         Behavior: Behavior normal.       Temp 98.6 °F (37 °C)       Assessment:       Diagnosis Orders   1. Poison ivy  methylPREDNISolone (MEDROL DOSEPACK) 4 MG tablet    desoximetasone (TOPICORT) 0.25 % cream       Plan:      No follow-ups on file. Orders Placed This Encounter   Medications    methylPREDNISolone (MEDROL DOSEPACK) 4 MG tablet     Sig: Take by mouth. Dispense:  1 kit     Refill:  0    desoximetasone (TOPICORT) 0.25 % cream     Sig: Apply topically 2 times daily. Dispense:  60 g     Refill:  1     No orders of the defined types were placed in this encounter. Patient given educational materials - see patient instructions. Discussed use, benefit, and side effects of prescribed medications. All patientquestions answered. Pt voiced understanding.     Electronically signed by Manas Browne MD on 8/6/2021at 9:39 AM

## 2021-08-07 LAB — STATUS: NORMAL

## 2021-09-27 ENCOUNTER — OFFICE VISIT (OUTPATIENT)
Dept: PODIATRY | Age: 46
End: 2021-09-27
Payer: COMMERCIAL

## 2021-09-27 VITALS — BODY MASS INDEX: 40.43 KG/M2 | HEIGHT: 74 IN | WEIGHT: 315 LBS

## 2021-09-27 DIAGNOSIS — B35.1 DERMATOPHYTOSIS OF NAIL: ICD-10-CM

## 2021-09-27 DIAGNOSIS — R26.2 DIFFICULTY WALKING: ICD-10-CM

## 2021-09-27 DIAGNOSIS — M79.605 BILATERAL LOWER EXTREMITY PAIN: ICD-10-CM

## 2021-09-27 DIAGNOSIS — I87.2 CHRONIC VENOUS INSUFFICIENCY: Primary | ICD-10-CM

## 2021-09-27 DIAGNOSIS — M79.604 BILATERAL LOWER EXTREMITY PAIN: ICD-10-CM

## 2021-09-27 PROCEDURE — G8427 DOCREV CUR MEDS BY ELIG CLIN: HCPCS | Performed by: PODIATRIST

## 2021-09-27 PROCEDURE — G8417 CALC BMI ABV UP PARAM F/U: HCPCS | Performed by: PODIATRIST

## 2021-09-27 PROCEDURE — 99203 OFFICE O/P NEW LOW 30 MIN: CPT | Performed by: PODIATRIST

## 2021-09-27 PROCEDURE — 4004F PT TOBACCO SCREEN RCVD TLK: CPT | Performed by: PODIATRIST

## 2021-09-27 PROCEDURE — 11721 DEBRIDE NAIL 6 OR MORE: CPT | Performed by: PODIATRIST

## 2021-09-27 NOTE — PATIENT INSTRUCTIONS
Schedule a Vaccine  When you qualify to receive the vaccine, call the [facility]) COVID-19 Vaccination Hotline to schedule your appointment or to get additional information about the [facility]) locations which are offering the COVID-19 vaccine. To be 94% effective, it's important that you receive two doses of one of the COVID-19 vaccines. -If you are receiving the [place] vaccine, your second shot will be scheduled as close to 21 days after the first shot as possible. -If you are receiving the Moderna vaccine, your second shot will be scheduled as close to 28 days after the first shot as possible. [facility]) COVID-19 Vaccination Hotline: [phone]    Links to [facility]) website and [facility] website:    [email or web address]-[email or web address]    [email or web address]

## 2021-09-27 NOTE — PROGRESS NOTES
[facility] PHYSICIANS  [facility]  [address] [place] [address]  Dept: [phone]  Dept Fax: [phone]     NEW PATIENT PAIN PROGRESS NOTE  Date of patient's visit: 9/27/2021  Patient's Name:  Theron Irizarry YOB: 1975            Patient Care Team:  AMY Ford CNP as PCP - General (Nurse Practitioner)  AMY Ford CNP as PCP - [facility] Empaneled Provider  Speedy Ferreira DPM as Physician (Podiatry)      Chief Complaint   Patient presents with    New Patient    Foot Pain     b/l    Nail Problem     tn       Subjective: This Theron Irizarry comes to clinic for foot and nail care. Pt currently has complaint of thickened, painful, elongated nails that he/she cannot manage by themselves. Pt. Relates pain to nails with shoe gear. Pt's primary care physician is AMY Ford CNP last seen 8/6/21. Past Medical History:   Diagnosis Date    Depression     Hypertriglyceridemia     Obesity        Allergies   Allergen Reactions    Codeine Other (See Comments)     hallucinates      Current Outpatient Medications on File Prior to Visit   Medication Sig Dispense Refill    desoximetasone (TOPICORT) 0.25 % cream Apply topically 2 times daily. 60 g 1    FLUoxetine (PROZAC) 10 MG capsule daily      mupirocin (BACTROBAN) 2 % cream       triamcinolone (KENALOG) 0.1 % cream       ARIPiprazole (ABILIFY) 5 MG tablet Take 5 mg by mouth daily      Multiple Vitamins-Minerals (THERAPEUTIC MULTIVITAMIN-MINERALS) tablet Take 1 tablet by mouth daily      Ascorbic Acid (VITAMIN C PO) Take by mouth      Omega-3 Fatty Acids (FISH OIL) 1000 MG CAPS Take 3,000 mg by mouth 3 times daily        No current facility-administered medications on file prior to visit. Review of Systems.     Review of Systems:   History obtained from chart review and the patient  General ROS: negative for - chills, fatigue, fever, night sweats or weight gain  Constitutional: Negative for chills, diaphoresis, fatigue, fever and unexpected weight change. Musculoskeletal: Positive for arthralgias, gait problem and joint swelling. Neurological ROS: negative for - behavioral changes, confusion, headaches or seizures. Negative for weakness and numbness. Dermatological ROS: negative for - mole changes, rash  Cardiovascular: Negative for leg swelling. Gastrointestinal: Negative for constipation, diarrhea, nausea and vomiting. Objective:  Dermatologic Exam:  Skin lesion/ulceration Absent . Skin No rashes or nodules noted. .   Skin is thin, with flaky sloughing skin as well as decreased hair growth to the lower leg  Small red hemosiderin deposits seen dorsal foot   Musculoskeletal:     1st MPJ ROM decreased, Bilateral.  Muscle strength 5/5, Bilateral.  Pain present upon palpation of toenails 1-5, Bilateral. decreased medial longitudinal arch, Bilateral.  Ankle ROM decreased,Bilateral.    Dorsally contracted digits present digits 2, Bilateral.     Vascular: DP pulses 1/4 bilateral.  PT pulses 0/4 bilateral.   CFT <5 seconds, Bilateral.  Hair growth absent to the level of the digits, Bilateral.  Edema present, Bilateral.  Varicosities absent, Bilateral. Erythema absent, Bilateral    Neurological: Sensation diminshed to light touch to level of digits, Bilateral.  Protective sensation intact 6/10 sites via 5.07/10g [place] Monofilament, Bilateral.  negative Tinel's, Bilateral.  negative Valleix sign, Bilateral.      Integument: Warm, dry, supple, Bilateral.  Open lesion absent, Bilateral.  Interdigital maceration absent to web spaces 4, Bilateral.  Nails 1-5 left and 1-5 right thickened > 3.0 mm, dystrophic and crumbly, discolored with subungual debris. Fissures absent, Bilateral.   General: AAO x 3 in NAD.     Derm  Toenail Description  Sites of Onychomycosis Involvement (Check affected area)  [x] [x] [x] [x] [x] [x] [x] [x] [x] [x]  5 4 3 2 1 1 2 3 4 5                          Right Left    Thickness  [x] [x] [x] [x] [x] [x] [x] [x] [x] [x]  5 4 3 2 1 1 2 3 4 5                         Right                                        Left    Dystrophic Changes   [x] [x] [x] [x] [x] [x] [x] [x] [x] [x]  5 4 3 2 1 1 2 3 4 5                         Right                                        Left    Color  [x] [x] [x] [x] [x] [x] [x] [x] [x] [x]  5 4 3 2 1 1 2 3 4 5                          Right                                        Left    Incurvation/Ingrowin   [] [] [] [] [] [] [] [] [] []  5 4 3 2 1 1 2 3 4 5                         Right                                        Left    Inflammation/Pain   [x] [x] [x] [x] [x] [x] [x] [x] [x] [x]  5 4 3  2 1 1 2 3 4 5                         Right                                        Left       Nails are painful 1-10 with direct palpation. Q7   []Yes  []No                Q8   [x]Yes  []No                     Q9   []Yes    []No  Assessment:  50 y.o. male with:    Diagnosis Orders   1. Chronic venous insufficiency  01137 - AR DEBRIDEMENT OF NAILS, 6 OR MORE   2. Dermatophytosis of nail  30501 - AR DEBRIDEMENT OF NAILS, 6 OR MORE   3. Bilateral lower extremity pain  68779 - AR DEBRIDEMENT OF NAILS, 6 OR MORE   4. Difficulty walking  99195 - AR DEBRIDEMENT OF NAILS, 6 OR MORE           Plan:   Pt was evaluated and examined. Patient was given personalized discharge instructions. Nails 1-10 were debrided in length and thickness sharply with a nail nipper and  without incident. Pt will follow up in 9 weeks or sooner if any problems arise. Diagnosis was discussed with the pt and all of their questions were answered in detail. Proper foot hygiene and care was discussed with the pt. Patient to check feet daily and contact the office with any questions/problems/concerns. Other comorbidity noted and will be managed by PCP. Pain waiver discussed with patient and confirmed.    9/27/2021      Electronically signed by Tammy Suarez DPM on 9/27/2021 at 10:55 AM  9/27/2021

## 2021-10-21 ENCOUNTER — HOSPITAL ENCOUNTER (EMERGENCY)
Age: 46
Discharge: HOME | End: 2021-10-22
Attending: EMERGENCY MEDICINE
Payer: COMMERCIAL

## 2021-10-21 VITALS
OXYGEN SATURATION: 98 % | RESPIRATION RATE: 20 BRPM | DIASTOLIC BLOOD PRESSURE: 73 MMHG | HEIGHT: 74 IN | SYSTOLIC BLOOD PRESSURE: 126 MMHG | BODY MASS INDEX: 40.43 KG/M2 | TEMPERATURE: 98.6 F | WEIGHT: 315 LBS | HEART RATE: 95 BPM

## 2021-10-21 DIAGNOSIS — N30.00 ACUTE CYSTITIS WITHOUT HEMATURIA: Primary | ICD-10-CM

## 2021-10-21 PROCEDURE — 96375 TX/PRO/DX INJ NEW DRUG ADDON: CPT

## 2021-10-21 PROCEDURE — 96361 HYDRATE IV INFUSION ADD-ON: CPT

## 2021-10-21 PROCEDURE — 99283 EMERGENCY DEPT VISIT LOW MDM: CPT

## 2021-10-21 PROCEDURE — 96365 THER/PROPH/DIAG IV INF INIT: CPT

## 2021-10-21 ASSESSMENT — PAIN SCALES - GENERAL: PAINLEVEL_OUTOF10: 1

## 2021-10-22 ENCOUNTER — APPOINTMENT (OUTPATIENT)
Dept: GENERAL RADIOLOGY | Age: 46
End: 2021-10-22
Payer: COMMERCIAL

## 2021-10-22 LAB
-: NORMAL
ABSOLUTE EOS #: 0.18 K/UL (ref 0–0.44)
ABSOLUTE IMMATURE GRANULOCYTE: 0.11 K/UL (ref 0–0.3)
ABSOLUTE LYMPH #: 2.75 K/UL (ref 1.1–3.7)
ABSOLUTE MONO #: 1.04 K/UL (ref 0.1–1.2)
ALBUMIN SERPL-MCNC: 4.1 G/DL (ref 3.5–5.2)
ALBUMIN/GLOBULIN RATIO: 1.4 (ref 1–2.5)
ALP BLD-CCNC: 78 U/L (ref 40–129)
ALT SERPL-CCNC: 16 U/L (ref 5–41)
AMORPHOUS: NORMAL
ANION GAP SERPL CALCULATED.3IONS-SCNC: 11 MMOL/L (ref 9–17)
AST SERPL-CCNC: 17 U/L
BACTERIA: NORMAL
BASOPHILS # BLD: 0 % (ref 0–2)
BASOPHILS ABSOLUTE: 0.06 K/UL (ref 0–0.2)
BILIRUB SERPL-MCNC: 0.45 MG/DL (ref 0.3–1.2)
BILIRUBIN URINE: NEGATIVE
BUN BLDV-MCNC: 11 MG/DL (ref 6–20)
BUN/CREAT BLD: ABNORMAL (ref 9–20)
CALCIUM SERPL-MCNC: 9 MG/DL (ref 8.6–10.4)
CASTS UA: NORMAL /LPF (ref 0–8)
CHLORIDE BLD-SCNC: 100 MMOL/L (ref 98–107)
CO2: 24 MMOL/L (ref 20–31)
COLOR: YELLOW
COMMENT UA: ABNORMAL
CREAT SERPL-MCNC: 0.84 MG/DL (ref 0.7–1.2)
CRYSTALS, UA: NORMAL /HPF
DIFFERENTIAL TYPE: ABNORMAL
EOSINOPHILS RELATIVE PERCENT: 1 % (ref 1–4)
EPITHELIAL CELLS UA: NORMAL /HPF (ref 0–5)
GFR AFRICAN AMERICAN: >60 ML/MIN
GFR NON-AFRICAN AMERICAN: >60 ML/MIN
GFR SERPL CREATININE-BSD FRML MDRD: ABNORMAL ML/MIN/{1.73_M2}
GFR SERPL CREATININE-BSD FRML MDRD: ABNORMAL ML/MIN/{1.73_M2}
GLUCOSE BLD-MCNC: 151 MG/DL (ref 70–99)
GLUCOSE URINE: NEGATIVE
HCT VFR BLD CALC: 44 % (ref 40.7–50.3)
HEMOGLOBIN: 14.5 G/DL (ref 13–17)
IMMATURE GRANULOCYTES: 1 %
KETONES, URINE: NEGATIVE
LEUKOCYTE ESTERASE, URINE: ABNORMAL
LYMPHOCYTES # BLD: 14 % (ref 24–43)
MCH RBC QN AUTO: 30.9 PG (ref 25.2–33.5)
MCHC RBC AUTO-ENTMCNC: 33 G/DL (ref 28.4–34.8)
MCV RBC AUTO: 93.6 FL (ref 82.6–102.9)
MONOCYTES # BLD: 5 % (ref 3–12)
MUCUS: NORMAL
NITRITE, URINE: NEGATIVE
NRBC AUTOMATED: 0 PER 100 WBC
OTHER OBSERVATIONS UA: NORMAL
PDW BLD-RTO: 12.7 % (ref 11.8–14.4)
PH UA: 6.5 (ref 5–8)
PLATELET # BLD: 195 K/UL (ref 138–453)
PLATELET ESTIMATE: ABNORMAL
PMV BLD AUTO: 10.4 FL (ref 8.1–13.5)
POTASSIUM SERPL-SCNC: 4.2 MMOL/L (ref 3.7–5.3)
PROTEIN UA: NEGATIVE
RBC # BLD: 4.7 M/UL (ref 4.21–5.77)
RBC # BLD: ABNORMAL 10*6/UL
RBC UA: NORMAL /HPF (ref 0–4)
RENAL EPITHELIAL, UA: NORMAL /HPF
SARS-COV-2, RAPID: NOT DETECTED
SEG NEUTROPHILS: 79 % (ref 36–65)
SEGMENTED NEUTROPHILS ABSOLUTE COUNT: 15.98 K/UL (ref 1.5–8.1)
SODIUM BLD-SCNC: 135 MMOL/L (ref 135–144)
SOURCE: NORMAL
SPECIFIC GRAVITY UA: 1 (ref 1–1.03)
SPECIMEN DESCRIPTION: NORMAL
TOTAL PROTEIN: 7 G/DL (ref 6.4–8.3)
TRICHOMONAS VAGINALI, MOLECULAR: NEGATIVE
TRICHOMONAS: NORMAL
TURBIDITY: CLEAR
URINE HGB: ABNORMAL
UROBILINOGEN, URINE: NORMAL
WBC # BLD: 20.1 K/UL (ref 3.5–11.3)
WBC # BLD: ABNORMAL 10*3/UL
WBC UA: NORMAL /HPF (ref 0–5)
YEAST: NORMAL

## 2021-10-22 PROCEDURE — 87086 URINE CULTURE/COLONY COUNT: CPT

## 2021-10-22 PROCEDURE — 87491 CHLMYD TRACH DNA AMP PROBE: CPT

## 2021-10-22 PROCEDURE — 87661 TRICHOMONAS VAGINALIS AMPLIF: CPT

## 2021-10-22 PROCEDURE — 81001 URINALYSIS AUTO W/SCOPE: CPT

## 2021-10-22 PROCEDURE — 96361 HYDRATE IV INFUSION ADD-ON: CPT

## 2021-10-22 PROCEDURE — 96365 THER/PROPH/DIAG IV INF INIT: CPT

## 2021-10-22 PROCEDURE — 87635 SARS-COV-2 COVID-19 AMP PRB: CPT

## 2021-10-22 PROCEDURE — 87077 CULTURE AEROBIC IDENTIFY: CPT

## 2021-10-22 PROCEDURE — 6360000002 HC RX W HCPCS: Performed by: STUDENT IN AN ORGANIZED HEALTH CARE EDUCATION/TRAINING PROGRAM

## 2021-10-22 PROCEDURE — 85025 COMPLETE CBC W/AUTO DIFF WBC: CPT

## 2021-10-22 PROCEDURE — 80053 COMPREHEN METABOLIC PANEL: CPT

## 2021-10-22 PROCEDURE — 71045 X-RAY EXAM CHEST 1 VIEW: CPT

## 2021-10-22 PROCEDURE — 2580000003 HC RX 258: Performed by: STUDENT IN AN ORGANIZED HEALTH CARE EDUCATION/TRAINING PROGRAM

## 2021-10-22 PROCEDURE — 96375 TX/PRO/DX INJ NEW DRUG ADDON: CPT

## 2021-10-22 PROCEDURE — 87186 SC STD MICRODIL/AGAR DIL: CPT

## 2021-10-22 PROCEDURE — 87591 N.GONORRHOEAE DNA AMP PROB: CPT

## 2021-10-22 RX ORDER — ONDANSETRON 2 MG/ML
4 INJECTION INTRAMUSCULAR; INTRAVENOUS ONCE
Status: COMPLETED | OUTPATIENT
Start: 2021-10-22 | End: 2021-10-22

## 2021-10-22 RX ORDER — CEPHALEXIN 500 MG/1
500 CAPSULE ORAL 4 TIMES DAILY
Qty: 28 CAPSULE | Refills: 0 | Status: SHIPPED | OUTPATIENT
Start: 2021-10-22 | End: 2021-10-29

## 2021-10-22 RX ORDER — KETOROLAC TROMETHAMINE 30 MG/ML
30 INJECTION, SOLUTION INTRAMUSCULAR; INTRAVENOUS ONCE
Status: COMPLETED | OUTPATIENT
Start: 2021-10-22 | End: 2021-10-22

## 2021-10-22 RX ORDER — ONDANSETRON 4 MG/1
4 TABLET, ORALLY DISINTEGRATING ORAL EVERY 8 HOURS PRN
Qty: 12 TABLET | Refills: 0 | Status: SHIPPED | OUTPATIENT
Start: 2021-10-22 | End: 2022-01-17 | Stop reason: ALTCHOICE

## 2021-10-22 RX ORDER — IBUPROFEN 600 MG/1
600 TABLET ORAL EVERY 6 HOURS PRN
Qty: 40 TABLET | Refills: 0 | Status: SHIPPED | OUTPATIENT
Start: 2021-10-22 | End: 2022-01-17 | Stop reason: ALTCHOICE

## 2021-10-22 RX ORDER — 0.9 % SODIUM CHLORIDE 0.9 %
1000 INTRAVENOUS SOLUTION INTRAVENOUS ONCE
Status: COMPLETED | OUTPATIENT
Start: 2021-10-22 | End: 2021-10-22

## 2021-10-22 RX ADMIN — ONDANSETRON 4 MG: 2 INJECTION INTRAMUSCULAR; INTRAVENOUS at 00:29

## 2021-10-22 RX ADMIN — KETOROLAC TROMETHAMINE 30 MG: 30 INJECTION, SOLUTION INTRAMUSCULAR at 00:29

## 2021-10-22 RX ADMIN — SODIUM CHLORIDE 1000 ML: 9 INJECTION, SOLUTION INTRAVENOUS at 00:28

## 2021-10-22 RX ADMIN — CEFTRIAXONE SODIUM 1000 MG: 1 INJECTION, POWDER, FOR SOLUTION INTRAMUSCULAR; INTRAVENOUS at 02:35

## 2021-10-22 ASSESSMENT — ENCOUNTER SYMPTOMS
ABDOMINAL PAIN: 0
COUGH: 0
NAUSEA: 1
SORE THROAT: 0
RHINORRHEA: 0
CHEST TIGHTNESS: 0
DIARRHEA: 0
SHORTNESS OF BREATH: 0
CONSTIPATION: 0
VOMITING: 0
BACK PAIN: 1

## 2021-10-22 NOTE — ED PROVIDER NOTES
[facility] ED  Emergency Department Encounter  Emergency Medicine Resident     Pt Name: Delonte Luis  MRN: [id number]  [place] 1975  Date of evaluation: 10/22/21  PCP:  AMY Jain CNP    CHIEF COMPLAINT       Chief Complaint   Patient presents with    Dizziness    Fever    Constipation     x2 days       HISTORY OFPRESENT ILLNESS  (Location/Symptom, Timing/Onset, Context/Setting, Quality, Duration, Modifying [place].)      Delonte Luis is a 48 y.o. male with past medical history of depression and schizophrenia who presents with vague complaints of generally not feeling well. Patient states that today he \"feels like he is coming down with something\". He states it is difficult to describe and he cannot decide if it is nausea or lightheadedness but that he just does not feel well. He repeats this statement several times to me. He does state that he had a headache and felt feverish but did not measure his temperature. He also reports mid upper back pain with no injury. He states that he popped his back on his own today and all of his symptoms started afterwards. He is unsure if this could have caused his symptoms. He is also reporting urinary frequency. He states that he has to urinate every 3-4 minutes. He denies chills, rhinorrhea, congestion, chest pain, shortness of breath, cough, abdominal pain, vomiting, diarrhea, dysuria, hematuria. Patient does report constipation but states this is a chronic problem for him. PAST MEDICAL / SURGICAL / SOCIAL / FAMILY HISTORY      has a past medical history of Depression, Hypertriglyceridemia, and Obesity. has a past surgical history that includes Tonsillectomy. Social:  reports that he has been smoking cigarettes. He has a 29.00 pack-year smoking history. He has never used smokeless tobacco. He reports that he does not drink alcohol and does not use drugs.     Family Hx:   Family History   Problem Relation Age of difficulty urinating, dysuria, hematuria, penile swelling, scrotal swelling and testicular pain. Musculoskeletal: Positive for back pain and myalgias. Negative for arthralgias and neck pain. Skin: Negative for rash. Neurological: Positive for light-headedness. Negative for dizziness, numbness and headaches. All other systems reviewed and are negative. PHYSICAL EXAM   (up to 7 for level 4, 8 or more forlevel 5)      INITIAL VITALS:   Vitals:    10/21/21 2341   BP: 126/73   Pulse: 95   Resp: 20   Temp: 98.6 °F (37 °C)   TempSrc: Oral   SpO2: 98%   Weight: (!) 330 lb (149.7 kg)   Height: 6' 2\" (1.88 m)        Physical Exam  Vitals and nursing note reviewed. Constitutional:       General: He is not in acute distress. Appearance: He is obese. He is not toxic-appearing. Comments: Adult male sitting in a chair no acute distress. He speaks in full sentences and answers questions appropriately. HENT:      Head: Normocephalic and atraumatic. Nose: Nose normal.      Mouth/Throat:      Mouth: Mucous membranes are moist.      Pharynx: Oropharynx is clear. Eyes:      Conjunctiva/sclera: Conjunctivae normal.      Pupils: Pupils are equal, round, and reactive to light. Cardiovascular:      Rate and Rhythm: Normal rate and regular rhythm. Pulses: Normal pulses. Heart sounds: No murmur heard. No friction rub. No gallop. Pulmonary:      Effort: Pulmonary effort is normal. No respiratory distress. Breath sounds: Normal breath sounds. No wheezing, rhonchi or rales. Abdominal:      General: There is no distension. Palpations: Abdomen is soft. Tenderness: There is no abdominal tenderness. There is no right CVA tenderness or left CVA tenderness. Comments: Abdomen is soft and nondistended. There is no abdominal tenderness to palpation. No CVA tenderness bilaterally. Musculoskeletal:         General: Tenderness present. Cervical back: Neck supple. No rigidity. Right lower leg: No edema. Left lower leg: No edema. Comments: Tenderness to palpation of thoracic paraspinal muscles around T10-12. No midline tenderness or bony step-offs. Skin:     General: Skin is warm and dry. Capillary Refill: Capillary refill takes less than 2 seconds. Findings: No rash. Neurological:      General: No focal deficit present. Mental Status: He is alert. Psychiatric:         Mood and Affect: Mood normal.         Behavior: Behavior normal.         DIFFERENTIAL  DIAGNOSIS     DDX: Viral illness, viral URI, COVID-19, pneumonia, electrolyte abnormality, dehydration, UTI, pyelonephritis, STI    Initial MDM/Plan: 48 y.o. male with past medical history of depression and schizophrenia who presents with vague complaints of generally not feeling well as well as urinary frequency which began today. On physical exam, patient does appear to not feel well but he is nontoxic with normal vital signs. He has no CVA or abdominal tenderness. Cardiopulmonary exam is benign. Suspect viral illness, including Covid and UTI. Will obtain basic labs, Covid swab and urinalysis to evaluate. Will treat symptomatically with IV fluids, analgesia and antiemetics. DIAGNOSTIC RESULTS / EMERGENCYDEPARTMENT COURSE / MDM     LABS:  Results for orders placed or performed during the hospital encounter of 10/21/21   COVID-19, Rapid    Specimen: Nasopharyngeal Swab   Result Value Ref Range    Specimen Description . NASOPHARYNGEAL SWAB     SARS-CoV-2, Rapid Not Detected Not Detected   Trichomonas Vaginali, Molecular    Specimen: Urine   Result Value Ref Range    Source . URINE     Trichomonas Vaginali, Molecular NEGATIVE NEGATIVE   CBC Auto Differential   Result Value Ref Range    WBC 20.1 (H) 3.5 - 11.3 k/uL    RBC 4.70 4.21 - 5.77 m/uL    Hemoglobin 14.5 13.0 - 17.0 g/dL    Hematocrit 44.0 40.7 - 50.3 %    MCV 93.6 82.6 - 102.9 fL    MCH 30.9 25.2 - 33.5 pg    MCHC 33.0 28.4 - 34.8 g/dL RDW 12.7 11.8 - 14.4 %    Platelets 497 060 - 445 k/uL    MPV 10.4 8.1 - 13.5 fL    NRBC Automated 0.0 0.0 per 100 WBC    Differential Type NOT REPORTED     Seg Neutrophils 79 (H) 36 - 65 %    Lymphocytes 14 (L) 24 - 43 %    Monocytes 5 3 - 12 %    Eosinophils % 1 1 - 4 %    Basophils 0 0 - 2 %    Immature Granulocytes 1 (H) 0 %    Segs Absolute 15.98 (H) 1.50 - 8.10 k/uL    Absolute Lymph # 2.75 1.10 - 3.70 k/uL    Absolute Mono # 1.04 0.10 - 1.20 k/uL    Absolute Eos # 0.18 0.00 - 0.44 k/uL    Basophils Absolute 0.06 0.00 - 0.20 k/uL    Absolute Immature Granulocyte 0.11 0.00 - 0.30 k/uL    WBC Morphology NOT REPORTED     RBC Morphology NOT REPORTED     Platelet Estimate NOT REPORTED    Comprehensive Metabolic Panel w/ Reflex to MG   Result Value Ref Range    Glucose 151 (H) 70 - 99 mg/dL    BUN 11 6 - 20 mg/dL    CREATININE 0.84 0.70 - 1.20 mg/dL    Bun/Cre Ratio NOT REPORTED 9 - 20    Calcium 9.0 8.6 - 10.4 mg/dL    Sodium 135 135 - 144 mmol/L    Potassium 4.2 3.7 - 5.3 mmol/L    Chloride 100 98 - 107 mmol/L    CO2 24 20 - 31 mmol/L    Anion Gap 11 9 - 17 mmol/L    Alkaline Phosphatase 78 40 - 129 U/L    ALT 16 5 - 41 U/L    AST 17 <40 U/L    Total Bilirubin 0.45 0.3 - 1.2 mg/dL    Total Protein 7.0 6.4 - 8.3 g/dL    Albumin 4.1 3.5 - 5.2 g/dL    Albumin/Globulin Ratio 1.4 1.0 - 2.5    GFR Non-African American >60 >60 mL/min    GFR African American >60 >60 mL/min    GFR Comment          GFR Staging NOT REPORTED    Urinalysis Reflex to Culture    Specimen: Urine, clean catch   Result Value Ref Range    Color, UA Yellow Yellow    Turbidity UA Clear Clear    Glucose, Ur NEGATIVE NEGATIVE    Bilirubin Urine NEGATIVE NEGATIVE    Ketones, Urine NEGATIVE NEGATIVE    Specific Gravity, UA 1.004 (L) 1.005 - 1.030    Urine Hgb SMALL (A) NEGATIVE    pH, UA 6.5 5.0 - 8.0    Protein, UA NEGATIVE NEGATIVE    Urobilinogen, Urine Normal Normal    Nitrite, Urine NEGATIVE NEGATIVE    Leukocyte Esterase, Urine LARGE (A) NEGATIVE Urinalysis Comments NOT REPORTED    Microscopic Urinalysis   Result Value Ref Range    -          WBC, UA 20 TO 50 0 - 5 /HPF    RBC, UA 0 TO 2 0 - 4 /HPF    Casts UA NOT REPORTED 0 - 8 /LPF    Crystals, UA NOT REPORTED None /HPF    Epithelial Cells UA NOT REPORTED 0 - 5 /HPF    Renal Epithelial, UA NOT REPORTED 0 /HPF    Bacteria, UA NOT REPORTED None    Mucus, UA NOT REPORTED None    Trichomonas, UA NOT REPORTED None    Amorphous, UA NOT REPORTED None    Other Observations UA NOT REPORTED NOT REQ. Yeast, UA NOT REPORTED None         RADIOLOGY:  XR CHEST PORTABLE    Result Date: 10/22/2021  EXAMINATION: ONE XRAY VIEW OF THE CHEST 10/21/2021 7:17 pm COMPARISON: December 22, 2015 HISTORY: ORDERING SYSTEM PROVIDED HISTORY: wbc 20, general mailase TECHNOLOGIST PROVIDED HISTORY: wbc 20, general mailase Reason for Exam: dizziness, fever   upright port FINDINGS: Marginal inspiration is present, exaggerating the lung markings and heart size. No focal area of consolidation or pneumothorax is present. Mediastinal contours appear normal for the patient's body habitus. Osseous structures are intact.      Marginal inspiration, without evidence of acute cardiopulmonary disease       EKG  None      MEDICATIONS ORDERED:  Orders Placed This Encounter   Medications    ondansetron (ZOFRAN) injection 4 mg    ketorolac (TORADOL) injection 30 mg    0.9 % sodium chloride bolus    cefTRIAXone (ROCEPHIN) 1000 mg IVPB in 50 mL D5W minibag     Order Specific Question:   Antimicrobial Indications     Answer:   Urinary Tract Infection    cephALEXin (KEFLEX) 500 MG capsule     Sig: Take 1 capsule by mouth 4 times daily for 7 days     Dispense:  28 capsule     Refill:  0    ibuprofen (IBU) 600 MG tablet     Sig: Take 1 tablet by mouth every 6 hours as needed for Pain     Dispense:  40 tablet     Refill:  0    ondansetron (ZOFRAN ODT) 4 MG disintegrating tablet     Sig: Take 1 tablet by mouth every 8 hours as needed for Nausea or

## 2021-10-22 NOTE — ED PROVIDER NOTES
[facility] 43   Emergency Department  Faculty Attestation       I performed a history and physical examination of the patient and discussed management with the resident. I reviewed the residents note and agree with the documented findings including all diagnostic interpretations and plan of care. Any areas of disagreement are noted on the chart. I was personally present for the key portions of any procedures. I have documented in the chart those procedures where I was not present during the key portions. I have reviewed the emergency nurses triage note. I agree with the chief complaint, past medical history, past surgical history, allergies, medications, social and family history as documented unless otherwise noted below. Documentation of the HPI, Physical Exam and Medical Decision Making performed by scribrosmery is based on my personal performance of the HPI, PE and MDM. For Physician Assistant/ Nurse Practitioner cases/documentation I have personally evaluated this patient and have completed at least one if not all key elements of the E/M (history, physical exam, and MDM). Additional findings are as noted. Pertinent Comments     Primary Care Physician: AMY Mason - CNP      ED Triage Vitals [10/21/21 2341]   BP Temp Temp Source Pulse Resp SpO2 Height Weight   126/73 98.6 °F (37 °C) Oral 95 20 98 % 6' 2\" (1.88 m) (!) 330 lb (149.7 kg)        History/Physical: This is a 48 y.o. male who presents to the Emergency Department with complaint of vague complaints. Patient states that he feels like he had a fever and headache earlier, that went away and then he felt like he was trying to catch a cold in his chest.  And then he started having urination that was occurring every 3 to 4 minutes and he does have having to pee over and over again. On exam, obese male resting comfortably in the chair. Normocephalic atraumatic. Mask in place. Heart sounds regular.   Lungs are clear to auscultation with good air entry bilaterally. Abdomen is protuberant, but soft with no tenderness throughout. No midline thoracic or lumbar tenderness. Normal range of motion of the back. Alert and oriented x4 with normal movement of all extremities, no rash or jaundice on exposed skin. Flat affect    MDM/Plan:   Patient with a large amount of vague complaints. Will check a Covid swab  Check basic labs due to the fact patient is vague complaints, and was having this frequent urination make sure there is no kidney dysfunction no signs of hypoglycemia.   Will check a UA  If no significant lab derangements we will plan to discharge      Critical Care: None     Chuy Wang MD  Attending Emergency Physician         Chuy Wang MD  10/22/21 2780

## 2021-10-23 LAB
CULTURE: ABNORMAL
Lab: ABNORMAL
SPECIMEN DESCRIPTION: ABNORMAL

## 2021-10-25 LAB
C. TRACHOMATIS DNA ,URINE: NEGATIVE
N. GONORRHOEAE DNA, URINE: NEGATIVE
SPECIMEN DESCRIPTION: NORMAL

## 2021-10-26 ENCOUNTER — TELEPHONE (OUTPATIENT)
Dept: FAMILY MEDICINE CLINIC | Age: 46
End: 2021-10-26

## 2021-11-29 ENCOUNTER — OFFICE VISIT (OUTPATIENT)
Dept: PODIATRY | Age: 46
End: 2021-11-29
Payer: COMMERCIAL

## 2021-11-29 VITALS — HEIGHT: 74 IN | BODY MASS INDEX: 40.43 KG/M2 | WEIGHT: 315 LBS

## 2021-11-29 DIAGNOSIS — R26.2 DIFFICULTY WALKING: ICD-10-CM

## 2021-11-29 DIAGNOSIS — M20.41 HAMMER TOES OF BOTH FEET: ICD-10-CM

## 2021-11-29 DIAGNOSIS — B35.1 DERMATOPHYTOSIS OF NAIL: Primary | ICD-10-CM

## 2021-11-29 DIAGNOSIS — M79.605 BILATERAL LOWER EXTREMITY PAIN: ICD-10-CM

## 2021-11-29 DIAGNOSIS — M20.42 HAMMER TOES OF BOTH FEET: ICD-10-CM

## 2021-11-29 DIAGNOSIS — M79.604 BILATERAL LOWER EXTREMITY PAIN: ICD-10-CM

## 2021-11-29 PROCEDURE — 4004F PT TOBACCO SCREEN RCVD TLK: CPT | Performed by: PODIATRIST

## 2021-11-29 PROCEDURE — G8427 DOCREV CUR MEDS BY ELIG CLIN: HCPCS | Performed by: PODIATRIST

## 2021-11-29 PROCEDURE — 99213 OFFICE O/P EST LOW 20 MIN: CPT | Performed by: PODIATRIST

## 2021-11-29 PROCEDURE — G8484 FLU IMMUNIZE NO ADMIN: HCPCS | Performed by: PODIATRIST

## 2021-11-29 PROCEDURE — G8417 CALC BMI ABV UP PARAM F/U: HCPCS | Performed by: PODIATRIST

## 2021-11-29 PROCEDURE — 11721 DEBRIDE NAIL 6 OR MORE: CPT | Performed by: PODIATRIST

## 2021-11-29 NOTE — PROGRESS NOTES
[facility] PHYSICIANS  [facility]  [address] [place] [address]  Dept: [phone]  Dept Fax: [phone]     PAIN PROGRESS NOTE  Date of patient's visit: 11/29/2021  Patient's Name:  Aylin Falcon YOB: 1975            Patient Care Team:  AMY Sweeney CNP as PCP - General (Nurse Practitioner)  AMY Sweeney CNP as PCP - [facility] Empaneled Provider  Lucita Mcginnis DPM as Physician (Podiatry)      Chief Complaint   Patient presents with    Foot Pain     b/l    Nail Problem     tn       Subjective: This Aylin Falcon comes to clinic for foot and nail care. Pt currently has complaint of thickened, painful, elongated nails that he/she cannot manage by themselves. Pt. Relates pain to nails with shoe gear. Pt's primary care physician is AMY Sweeney CNP last seen 8/6/21. Pt has a new complaint of pain and tingling in bilateral toes and hammertoes, michael   Past Medical History:   Diagnosis Date    Depression     Hypertriglyceridemia     Obesity        Allergies   Allergen Reactions    Codeine Other (See Comments)     hallucinates      Current Outpatient Medications on File Prior to Visit   Medication Sig Dispense Refill    ondansetron (ZOFRAN ODT) 4 MG disintegrating tablet Take 1 tablet by mouth every 8 hours as needed for Nausea or Vomiting 12 tablet 0    desoximetasone (TOPICORT) 0.25 % cream Apply topically 2 times daily.  60 g 1    FLUoxetine (PROZAC) 10 MG capsule daily      mupirocin (BACTROBAN) 2 % cream       triamcinolone (KENALOG) 0.1 % cream       ARIPiprazole (ABILIFY) 5 MG tablet Take 5 mg by mouth daily      Multiple Vitamins-Minerals (THERAPEUTIC MULTIVITAMIN-MINERALS) tablet Take 1 tablet by mouth daily      Ascorbic Acid (VITAMIN C PO) Take by mouth      Omega-3 Fatty Acids (FISH OIL) 1000 MG CAPS Take 3,000 mg by mouth 3 times daily       ibuprofen (IBU) 600 MG tablet Take 1 tablet by mouth every 6 hours as needed for Pain 40 tablet 0     No current facility-administered medications on file prior to visit. Review of Systems. Review of Systems:   History obtained from chart review and the patient  General ROS: negative for - chills, fatigue, fever, night sweats or weight gain  Constitutional: Negative for chills, diaphoresis, fatigue, fever and unexpected weight change. Musculoskeletal: Positive for arthralgias, gait problem and joint swelling. Neurological ROS: negative for - behavioral changes, confusion, headaches or seizures. Negative for weakness and numbness. Dermatological ROS: negative for - mole changes, rash  Cardiovascular: Negative for leg swelling. Gastrointestinal: Negative for constipation, diarrhea, nausea and vomiting. Objective:  Dermatologic Exam:  Skin lesion/ulceration Absent . Skin No rashes or nodules noted. .   Skin is thin, with flaky sloughing skin as well as decreased hair growth to the lower leg  Small red hemosiderin deposits seen dorsal foot   Musculoskeletal:     1st MPJ ROM decreased, Bilateral.  Muscle strength 5/5, Bilateral.  Pain present upon palpation of toenails 1-5, Bilateral. decreased medial longitudinal arch, Bilateral.  Ankle ROM decreased,Bilateral.    Dorsally contracted digits present digits 2, Bilateral.     Vascular: DP pulses 1/4 bilateral.  PT pulses 0/4 bilateral.   CFT <5 seconds, Bilateral.  Hair growth absent to the level of the digits, Bilateral.  Edema present, Bilateral.  Varicosities absent, Bilateral. Erythema absent, Bilateral    Neurological: Sensation diminshed to light touch to level of digits, Bilateral.  Protective sensation intact 6/10 sites via 5.07/10g Chester-Mars Monofilament, Bilateral.  negative Tinel's, Bilateral.  negative Valleix sign, Bilateral.      Integument: Warm, dry, supple, Bilateral.  Open lesion absent, Bilateral.  Interdigital maceration absent to web spaces 4, Bilateral.  Nails 1-5 left and 1-5 right thickened > 3.0 mm, dystrophic and crumbly, discolored with subungual debris. Fissures absent, Bilateral.   General: AAO x 3 in NAD. Derm  Toenail Description  Sites of Onychomycosis Involvement (Check affected area)  [x] [x] [x] [x] [x] [x] [x] [x] [x] [x]  5 4 3 2 1 1 2 3 4 5                          Right                                        Left    Thickness  [x] [x] [x] [x] [x] [x] [x] [x] [x] [x]  5 4 3 2 1 1 2 3 4 5                         Right                                        Left    Dystrophic Changes   [x] [x] [x] [x] [x] [x] [x] [x] [x] [x]  5 4 3 2 1 1 2 3 4 5                         Right                                        Left    Color  [x] [x] [x] [x] [x] [x] [x] [x] [x] [x]  5 4 3 2 1 1 2 3 4 5                          Right                                        Left    Incurvation/Ingrowin   [] [] [] [] [] [] [] [] [] []  5 4 3 2 1 1 2 3 4 5                         Right                                        Left    Inflammation/Pain   [x] [x] [x] [x] [x] [x] [x] [x] [x] [x]  5 4 3  2 1 1 2 3 4 5                         Right                                        Left       Nails are painful 1-10 with direct palpation. Q7   []Yes  []No                Q8   [x]Yes  []No                     Q9   []Yes    []No  Assessment:  48 y.o. male with:    Diagnosis Orders   1. Dermatophytosis of nail  42904 - AL DEBRIDEMENT OF NAILS, 6 OR MORE   2. Bilateral lower extremity pain  23479 - AL DEBRIDEMENT OF NAILS, 6 OR MORE   3. Difficulty walking  15828 - AL DEBRIDEMENT OF NAILS, 6 OR MORE   4. Hammer toes of both feet             Plan:   Pt was evaluated and examined. Patient was given personalized discharge instructions. Discussed surgical correction of hammertoes. Pt to consider surgery as last treatment option. Avoid narrow shoes. Recommend pt to avoid walking barefoot.   All labs were reviewed and all imagining including the above findings were reviewed PRIOR to the patients arrival and with the patient today. Previous patient encounter was reviewed. Encounters from the patients other medical providers were reviewed and noted. Time was spent educating the patient and their families/caregivers on proper care of the feet and ankles. All the above diagnosis were addressed at todays visit and all questions were answered. A total of 25 minutes was spent with this patients encounter which included charting after the patients visit    Nails 1-10 were debrided in length and thickness sharply with a nail nipper and  without incident. Pt will follow up in 9 weeks or sooner if any problems arise. Diagnosis was discussed with the pt and all of their questions were answered in detail. Proper foot hygiene and care was discussed with the pt. Patient to check feet daily and contact the office with any questions/problems/concerns. Other comorbidity noted and will be managed by PCP. Pain waiver discussed with patient and confirmed.    11/29/2021      Electronically signed by Jina Joyce DPM on 11/29/2021 at 2:05 PM  11/29/2021

## 2021-12-02 ENCOUNTER — HOSPITAL ENCOUNTER (EMERGENCY)
Age: 46
Discharge: HOME | End: 2021-12-02
Attending: EMERGENCY MEDICINE
Payer: COMMERCIAL

## 2021-12-02 VITALS
SYSTOLIC BLOOD PRESSURE: 123 MMHG | TEMPERATURE: 98.1 F | RESPIRATION RATE: 16 BRPM | HEART RATE: 91 BPM | DIASTOLIC BLOOD PRESSURE: 87 MMHG | OXYGEN SATURATION: 94 %

## 2021-12-02 DIAGNOSIS — Z13.9 ENCOUNTER FOR MEDICAL SCREENING EXAMINATION: Primary | ICD-10-CM

## 2021-12-02 LAB
-: ABNORMAL
AMORPHOUS: ABNORMAL
BACTERIA: ABNORMAL
BILIRUBIN URINE: NEGATIVE
CASTS UA: ABNORMAL /LPF (ref 0–8)
COLOR: YELLOW
CRYSTALS, UA: ABNORMAL /HPF
EPITHELIAL CELLS UA: ABNORMAL /HPF (ref 0–5)
GLUCOSE URINE: NEGATIVE
KETONES, URINE: NEGATIVE
LEUKOCYTE ESTERASE, URINE: NEGATIVE
MUCUS: ABNORMAL
NITRITE, URINE: NEGATIVE
OTHER OBSERVATIONS UA: ABNORMAL
PH UA: 6.5 (ref 5–8)
PROTEIN UA: NEGATIVE
RBC UA: ABNORMAL /HPF (ref 0–4)
RENAL EPITHELIAL, UA: ABNORMAL /HPF
SPECIFIC GRAVITY UA: 1 (ref 1–1.03)
TRICHOMONAS: ABNORMAL
TURBIDITY: CLEAR
URINE HGB: NEGATIVE
UROBILINOGEN, URINE: NORMAL
WBC UA: ABNORMAL /HPF (ref 0–5)
YEAST: ABNORMAL

## 2021-12-02 PROCEDURE — 81001 URINALYSIS AUTO W/SCOPE: CPT

## 2021-12-02 PROCEDURE — 99282 EMERGENCY DEPT VISIT SF MDM: CPT

## 2021-12-02 ASSESSMENT — ENCOUNTER SYMPTOMS
ABDOMINAL PAIN: 0
SHORTNESS OF BREATH: 0
BACK PAIN: 0

## 2021-12-02 NOTE — ED NOTES
Bed: 41  Expected date:   Expected time:   Means of arrival:   Comments:  [address], RN  12/02/21 1186

## 2021-12-02 NOTE — ED PROVIDER NOTES
[address] ED  Faculty Attestation    I performed a history and physical examination of the patient and discussed management with the resident. I reviewed the residents note and agree with the documented findings and plan of care. Any areas of disagreement are noted on the chart. I was personally present for the key portions of any procedures. I have documented in the chart those procedures where I was not present during the key portions. I have reviewed the emergency nurses triage note. I agree with the chief complaint, past medical history, past surgical history, allergies, medications, social, and family history as documented unless otherwise noted below.        This is a 67-year-old male who presents to the emergency department requesting a repeat evaluation  States that he was recently diagnosed with a urinary tract infection  He finished a course of antibiotics and came back to the to the emergency department today to make sure that it is has \"all gone away\"  No fever or shaking chills  No chest pain or acute pulmonary complaints  Denies abdominal, flank, or back pain  No dysuria or hematuria  No genital discharge  Notes that his urine is more yellow than normal but has no other specific genitourinary complaints at this time  He has not followed up as an outpatient after his recent UTI diagnosis  He has no additional complaints at this time  Review of systems otherwise negative    On examination he appears in no acute distress  Heart is regular in rate and rhythm  Lungs are clear to auscultation  Chest wall and abdomen are nontender  Abdomen is obese but overall soft  No rebound tenderness or guarding  No flank or CVA tenderness  Normal peripheral perfusion and skin turgor  Skin is warm and dry    Blood work or genital exam not indicated  UA ordered    UA negative  Patient updated  Stable for outpatient follow-up       Ling Sylvester DO  Attending Emergency Physician       Kristine Mohan, DO  12/02/21 1341

## 2021-12-02 NOTE — ED NOTES
This patient was assessed by the doctor only. Nurse processed and completed the orders from this doctor ie labs, meds, and/or EKG.       Froylan Mauro RN  12/02/21 8413

## 2021-12-02 NOTE — ED PROVIDER NOTES
[address]  ED  Emergency Department Encounter  EmergencyMedicine Resident     Pt Ana Garland  MRN: [id number]  [place] 1975  Date of evaluation: 12/2/21  PCP:  AMY Beth CNP    This patient was evaluated in the Emergency Department for symptoms described in the history of present illness. The patient was evaluated in the context of the global COVID-19 pandemic, which necessitated consideration that the patient might be at risk for infection with the SARS-CoV-2 virus that causes COVID-19. Institutional protocols and algorithms that pertain to the evaluation of patients at risk for COVID-19 are in a state of rapid change based on information released by regulatory bodies including the CDC and federal and state organizations. These policies and algorithms were followed during the patient's care in the ED. CHIEF COMPLAINT       Chief Complaint   Patient presents with    Other     follow up from 1 month ago pt was here with a UTI       HISTORY OF PRESENT ILLNESS  (Location/Symptom, Timing/Onset, Context/Setting, Quality, Duration, Modifying Factors, Severity.)      Anabelle Guevara is a 48 y.o. male who presents for reevaluation for UTI. Patient was seen in the emergency department roughly 1 month ago and diagnosed with UTI. Patient was discharged home with antibiotics. Patient states that he took his full course of antibiotics. Presented back to the emergency department today for reevaluation of UTI. Patient denies UTI symptoms, no dysuria, no urgency or frequency, no fever chills, no flank pain, no testicular pain, no discharge. Denies fever/chills, cough, chest pain, shortness of breath, change in bowel or bladder function, abdominal pain, numbness or tingling, focal weakness. PAST MEDICAL / SURGICAL / SOCIAL / FAMILY HISTORY      has a past medical history of Depression, Hypertriglyceridemia, and Obesity.       has a past surgical history that includes Tonsillectomy. Social History     Socioeconomic History    Marital status: Single     Spouse name: Not on file    Number of children: Not on file    Years of education: Not on file    Highest education level: Not on file   Occupational History    Not on file   Tobacco Use    Smoking status: Current Every Day Smoker     Packs/day: 1.00     Years: 29.00     Pack years: 29.00     Types: Cigarettes    Smokeless tobacco: Never Used   Vaping Use    Vaping Use: Never used   Substance and Sexual Activity    Alcohol use: No    Drug use: No    Sexual activity: Not Currently   Other Topics Concern    Not on file   Social History Narrative    Not on file     Social Determinants of Health     Financial Resource Strain:     Difficulty of Paying Living Expenses: Not on file   Food Insecurity:     Worried About Running Out of Food in the Last Year: Not on file    Rain of Food in the Last Year: Not on file   Transportation Needs:     Lack of Transportation (Medical): Not on file    Lack of Transportation (Non-Medical):  Not on file   Physical Activity:     Days of Exercise per Week: Not on file    Minutes of Exercise per Session: Not on file   Stress:     Feeling of Stress : Not on file   Social Connections:     Frequency of Communication with Friends and Family: Not on file    Frequency of Social Gatherings with Friends and Family: Not on file    Attends Nondenominational Services: Not on file    Active Member of [address] [place] [address] or Organizations: Not on file    Attends Club or Organization Meetings: Not on file    Marital Status: Not on file   Intimate Partner Violence:     Fear of Current or Ex-Partner: Not on file    Emotionally Abused: Not on file    Physically Abused: Not on file    Sexually Abused: Not on file   Housing Stability:     Unable to Pay for Housing in the Last Year: Not on file    Number of [place] in the Last Year: Not on file    Unstable Housing in the Last Year: Not on file Family History   Problem Relation Age of Onset   Femi Cruz Cancer Mother         breast    Other Father         COPD    Cancer Father         prostate    Diabetes Sister        Allergies:  Codeine    Home Medications:  Prior to Admission medications    Medication Sig Start Date End Date Taking? Authorizing Provider   ciclopirox (PENLAC) 8 % solution Apply topically nightly. Remove once weekly with alcohol or nail polish remover. 11/29/21   Leim Blush, DPM   ibuprofen (IBU) 600 MG tablet Take 1 tablet by mouth every 6 hours as needed for Pain 10/22/21 11/1/21  Amanda Sport, DO   ondansetron (ZOFRAN ODT) 4 MG disintegrating tablet Take 1 tablet by mouth every 8 hours as needed for Nausea or Vomiting 10/22/21   Amanda Sport, DO   desoximetasone (TOPICORT) 0.25 % cream Apply topically 2 times daily. 8/6/21   Albina Vasquez MD   FLUoxetine (PROZAC) 10 MG capsule daily 7/13/21   Historical Provider, MD   mupirocin Olegario [place]) 2 % cream  12/9/20   Historical Provider, MD   triamcinolone (KENALOG) 0.1 % cream  12/15/20   Historical Provider, MD   ARIPiprazole (ABILIFY) 5 MG tablet Take 5 mg by mouth daily    Historical Provider, MD   Multiple Vitamins-Minerals (THERAPEUTIC MULTIVITAMIN-MINERALS) tablet Take 1 tablet by mouth daily    Historical Provider, MD   Ascorbic Acid (VITAMIN C PO) Take by mouth    Historical Provider, MD   Omega-3 Fatty Acids (FISH OIL) 1000 MG CAPS Take 3,000 mg by mouth 3 times daily     Historical Provider, MD       REVIEW OF SYSTEMS    (2-9 systems for level 4, 10 or more for level 5)    Review of Systems   Constitutional: Negative for chills and fever. HENT: Negative for congestion. Eyes: Negative for visual disturbance. Respiratory: Negative for shortness of breath. Cardiovascular: Negative for chest pain. Gastrointestinal: Negative for abdominal pain.    Genitourinary: Negative for difficulty urinating, dysuria, flank pain, frequency, genital sores, hematuria, penile discharge, penile pain, penile swelling, scrotal swelling, testicular pain and urgency. Musculoskeletal: Negative for back pain. Skin: Negative for wound. Neurological: Negative for weakness and numbness. PHYSICAL EXAM   (up to 7 for level 4, 8 or more for level 5)    INITIAL VITALS:   /87   Pulse 91   Temp 98.1 °F (36.7 °C)   Resp 16   SpO2 94%   I have reviewed the triage vital signs. Const: Well nourished, well developed, appears stated age  Eyes: PERRL, no conjunctival injection  HENT: NCAT, Neck supple without meningismus   CV: RRR, Warm, well-perfused extremities  RESP: CTAB, Unlabored respiratory effort  GI: soft, non-tender, non-distended, no masses  MSK: No CVA tenderness. No gross deformities appreciated  Skin: Warm, dry. No rashes  Neuro: Alert, CNs II-XII grossly intact. Sensation and motor function of extremities grossly intact. Psych: Appropriate mood and affect. DIFFERENTIAL  DIAGNOSIS   DDX:  Screening for UTI    Initial MDM:  Patient presented to the emergency department for concerns for UTI. Patient was seen in the emergency department last month and diagnosed with UTI, discharged home with antibiotics. Patient finished whole course of antibiotics. Patient did not follow-up outpatient. Presents today for reevaluation of UTI. Will order urinalysis. PLAN (LABS / IMAGING / EKG):  Orders Placed This Encounter   Procedures    Urinalysis with microscopic       MEDICATIONS ORDERED:  No orders of the defined types were placed in this encounter.         DIAGNOSTIC RESULTS / EMERGENCY DEPARTMENT COURSE / MDM   LAB RESULTS:  Results for orders placed or performed during the hospital encounter of 12/02/21   Urinalysis with microscopic   Result Value Ref Range    Color, UA Yellow Yellow    Turbidity UA Clear Clear    Glucose, Ur NEGATIVE NEGATIVE    Bilirubin Urine NEGATIVE NEGATIVE    Ketones, Urine NEGATIVE NEGATIVE    Specific Gravity, UA 1.003 (L) 1.005 - 1.030 Urine Hgb NEGATIVE NEGATIVE    pH, UA 6.5 5.0 - 8.0    Protein, UA NEGATIVE NEGATIVE    Urobilinogen, Urine Normal Normal    Nitrite, Urine NEGATIVE NEGATIVE    Leukocyte Esterase, Urine NEGATIVE NEGATIVE    -          WBC, UA NOT REPORTED 0 - 5 /HPF    RBC, UA NOT REPORTED 0 - 4 /HPF    Casts UA NOT REPORTED 0 - 8 /LPF    Crystals, UA NOT REPORTED None /HPF    Epithelial Cells UA 0 TO 2 0 - 5 /HPF    Renal Epithelial, UA NOT REPORTED 0 /HPF    Bacteria, UA NOT REPORTED None    Mucus, UA NOT REPORTED None    Trichomonas, UA NOT REPORTED None    Amorphous, UA NOT REPORTED None    Other Observations UA NOT REPORTED NOT REQ. Yeast, UA NOT REPORTED None     UA negative for UTI    RADIOLOGY:  None Indicated      MDM:  Patient presents to the emergency department for evaluation for UTI. Urinalysis was performed to urinalysis negative for UTI. Patient unable to be found in the emergency department. Patient left without being registered. Patient left before lab work could be discussed with patient. Patient not given any discharge paperwork as he was not in the department. PROCEDURES:  None indicated    CONSULTS:  None    CRITICAL CARE:  Please see Attending Note    FINAL IMPRESSION      1.  Encounter for medical screening examination          DISPOSITION / PLAN     DISPOSITION Decision To Discharge 12/02/2021 01:41:03 PM    PATIENT REFERRED TO:  AMY Jain - [facility]  [address]    Schedule an appointment as soon as possible for a visit   As needed    [facility]  [address] [facility] [address]  [phone]  Go to   If symptoms worsen      DISCHARGE MEDICATIONS:  Discharge Medication List as of 12/2/2021  2:07 PM          Germania Gorman DO  Emergency Medicine Resident    (Please note that portions of this note were completed with a voice recognition program.  Efforts were made to edit the dictations but occasionally words are mis-transcribed.)         Michelle Smith DO  Resident  12/02/21 9643

## 2022-01-17 ENCOUNTER — OFFICE VISIT (OUTPATIENT)
Dept: DERMATOLOGY | Age: 47
End: 2022-01-17
Payer: COMMERCIAL

## 2022-01-17 VITALS
OXYGEN SATURATION: 94 % | HEIGHT: 74 IN | WEIGHT: 315 LBS | TEMPERATURE: 97.5 F | BODY MASS INDEX: 40.43 KG/M2 | HEART RATE: 85 BPM | DIASTOLIC BLOOD PRESSURE: 80 MMHG | SYSTOLIC BLOOD PRESSURE: 132 MMHG

## 2022-01-17 DIAGNOSIS — L30.9 HAND DERMATITIS: ICD-10-CM

## 2022-01-17 DIAGNOSIS — L57.0 ACTINIC KERATOSIS: ICD-10-CM

## 2022-01-17 DIAGNOSIS — D22.9 MULTIPLE NEVI: ICD-10-CM

## 2022-01-17 DIAGNOSIS — I87.2 VENOUS STASIS DERMATITIS OF BOTH LOWER EXTREMITIES: Primary | ICD-10-CM

## 2022-01-17 PROCEDURE — 99203 OFFICE O/P NEW LOW 30 MIN: CPT | Performed by: DERMATOLOGY

## 2022-01-17 PROCEDURE — G8417 CALC BMI ABV UP PARAM F/U: HCPCS | Performed by: DERMATOLOGY

## 2022-01-17 PROCEDURE — 4004F PT TOBACCO SCREEN RCVD TLK: CPT | Performed by: DERMATOLOGY

## 2022-01-17 PROCEDURE — G8484 FLU IMMUNIZE NO ADMIN: HCPCS | Performed by: DERMATOLOGY

## 2022-01-17 PROCEDURE — G8427 DOCREV CUR MEDS BY ELIG CLIN: HCPCS | Performed by: DERMATOLOGY

## 2022-01-17 NOTE — PATIENT INSTRUCTIONS
Recommend compression socks for venous stasis dermatitis. Use triamcinolone 0.1 ointment on hands twice daily, you can also use this on your legs if they become itchy. After washing hands, pat dry and apply a moisturizer (recommend Cerave, Cetaphil, or Eucerin). Continue using ciclopirox on toe nails. Moles    Moles, or nevi, are very common. Moles are areas of the skin where there are more cells called melanocytes. Melanocytes are the cells in the body that produce pigment, or color. Moles can be many colors including skin-tone, pink, tan, brown, and very dark brown to black. Moles can be raised or flat. Moles can have hair. Moles can grow on any skin surface, including the scalp, hands and feet. When someone is born with a mole, or develops one in the first months of life, the mole is called a congenital, or birthmark mole. About 1 in 100 people are born with one or more moles. Most people develop their moles later in childhood or adulthood. These are called acquired moles. They are most common on sun exposed areas of skin such as the face, neck, upper body, arms and legs. CHECKING MOLES  Most moles are harmless, but in rare cases moles may become cancerous. Checking moles and looking for changes is an important step in helping to catch worrisome changes early. Some changes to look for are asymmetry (moles that do not look the same on each half), irregular shapes or borders, uneven color or large size. Also look for any moles that bleed, itch, or become painful. Looking at your skin regularly can help you recognize moles that are more at risk for becoming cancerous. WHEN TO CALL THE DOCTOR  Call your doctor if you see any of the following changes in a mole:       Irregular borders (uneven shape or edges)       Changes in color to black, blue or red.        Changes in the surface texture       Scabs, scaling, irritation or bleeding in the mole    TREATMENT FOR MOLES  Often we can simply look at your moles and tell you if they look worrisome. If we are not concerned about the look of your moles at your appointment, we may measure some moles and take some photos that will allow us to watch for future changes in the moles. TREATMENT FOR MOLES  If a mole is getting irritated frequently, bleeding, difficult to watch due to location or dark color, atypical in appearance, or worrisome, we may perform a skin biopsy. A skin biopsy is a procedure that involves removing the mole so that it can be looked at under a microscope. There are many methods used to remove moles. The method we choose depends on the location of the mole, the size of the mole, and the amount of concern for skin cancer. Generally, removing moles in the dermatologists office is a simple and safe procedure that can be done with local anesthesia. PREVENTION  You can do some things to prevent moles from becoming cancerous:       Try to avoid long periods of time in the sun and severe sunburns. The sun is        especially dangerous between 10:00 am and 4:00 pm.       Use a broad spectrum, water-resistant sun block lotion with an SPF of 30 or        greater. A broad spectrum lotion blocks both UVA and UVB rays from the sun. Re-apply sunscreen at least every 2 hours and after swimming or sweating.      Take advantage of shade whenever possible. Wear a broad-brimmed hat,        sunglasses, and protective clothing when outdoors.      Do not use tanning beds.

## 2022-01-17 NOTE — PROGRESS NOTES
Dermatology Patient Note  [address]  [address] [facility] [address]  Dept: [phone]  Dept Fax: [phone]      VISITDATE: 1/17/2022   REFERRING PROVIDER: No ref. provider found      Kari Kuhn is a 48 y.o. male  who presents today in the office for:    New Patient (Pt would like a full body exam. His urgent dr asked for him to see a dermatologist. Losing hair on the legs, toenails has fungus. No self/family hx of skin cancer. )      HISTORY OF PRESENT ILLNESS:  As above. Currently using ciclopirox as prescribed by his podiatrist. He used it for one week then stopped for two weeks. Patient states that his hands and legs do not itch. He has been using OTC eczema topicals. Patient states that he would like his \"skin to go back to just moles, freckles, and hair\". MEDICAL PROBLEMS:  Patient Active Problem List    Diagnosis Date Noted    Smoker unmotivated to quit 12/21/2020    Schizophrenia ([facility] 08/24/2018    Homicidal ideation 08/22/2018    Morbid obesity due to excess calories ([facility] 03/27/2017       CURRENT MEDICATIONS:   Current Outpatient Medications   Medication Sig Dispense Refill    triamcinolone (KENALOG) 0.1 % ointment Apply to rash on hands twice daily (not face, armpit or groin) 80 g 3    ciclopirox (PENLAC) 8 % solution Apply topically nightly. Remove once weekly with alcohol or nail polish remover. 1 each 3    FLUoxetine (PROZAC) 10 MG capsule daily      mupirocin (BACTROBAN) 2 % cream       ARIPiprazole (ABILIFY) 5 MG tablet Take 5 mg by mouth daily      Multiple Vitamins-Minerals (THERAPEUTIC MULTIVITAMIN-MINERALS) tablet Take 1 tablet by mouth daily      Ascorbic Acid (VITAMIN C PO) Take by mouth      desoximetasone (TOPICORT) 0.25 % cream Apply topically 2 times daily. (Patient not taking: Reported on 1/17/2022) 60 g 1     No current facility-administered medications for this visit. ALLERGIES:   Allergies   Allergen Reactions    Codeine Other (See Comments)     hallucinates        SOCIAL HISTORY:  Social History     Tobacco Use    Smoking status: Current Every Day Smoker     Packs/day: 1.00     Years: 29.00     Pack years: 29.00     Types: Cigarettes    Smokeless tobacco: Never Used   Substance Use Topics    Alcohol use: No       Pertinent ROS:  Review of Systems  Skin: Denies any new changing, growing or bleeding lesions or rashes except as described in the HPI   Constitutional: Denies fevers, chills, and malaise. PHYSICAL EXAM:   /80 (Site: Left Upper Arm, Position: Sitting, Cuff Size: Large Adult)   Pulse 85   Temp 97.5 °F (36.4 °C) (Temporal)   Ht 6' 2\" (1.88 m)   Wt (!) 337 lb 9.6 oz (153.1 kg)   SpO2 94%   BMI 43.35 kg/m²     The patient is generally well appearing, well nourished, alert and conversational. Affect is normal.    Cutaneous Exam:  Physical Exam  Total body skin exam excluding external genitalia: head/face, neck, both arms, chest, back, abdomen, both legs, buttocks, digits and/or nails, was examined. Genital exam was deferred as patient denied having any lesions in this area. Complete visualization of scalp may be limited by hair density, length, and/or style    Facial covering was removed during examination. Diagnoses/exam findings/medical history pertinent to this visit are listed below:    Assessment:   Diagnosis Orders   1. Venous stasis dermatitis of both lower extremities     2. Hand dermatitis  triamcinolone (KENALOG) 0.1 % ointment   3. Multiple nevi     4.  Actinic keratosis          Plan:  Venous stasis dermatitis  - recommend wearing compression socks     Hand dermatitis  - triamcinolone 0.1 ointment BID on hands and legs  - after washing hands, pat dry and apply a moisturizer     Actinic keratosis, left upper arm  - patient declined cryotherapy  - will continue to monitor     Multiple nevi  - Clinically and dermatoscopically benign on exam today. - Common nevi have a low individual risk of developing into melanoma. Patients with >50 nevi have a greater risk of developing melanoma in their lifetime and should undergo skin checks at least annually. - I recommended the patient apply broad spectrum spf 30+ sunscreen daily, reapplying every 2 hours  - In additional to regular use of sunscreen, I recommended broad-rimmed hats, long sleeves, and seeking the shade. RTC 6 months with Oli Montano     Future Appointments   Date Time Provider Sharon Veras   1/17/2022  9:00 AM Lilo Alva MD  derm [facility]   1/19/2022  1:40 PM AMY Abbott - CNP [facility]   2/2/2022  2:30 PM MAVERICK Mccarthy Podiatry [facility]         Patient Instructions   Recommend compression socks for venous stasis dermatitis. Use triamcinolone 0.1 ointment on hands twice daily, you can also use this on your legs if they become itchy. After washing hands, pat dry and apply a moisturizer (recommend Cerave, Cetaphil, or Eucerin). Continue using ciclopirox on toe nails. Moles    Moles, or nevi, are very common. Moles are areas of the skin where there are more cells called melanocytes. Melanocytes are the cells in the body that produce pigment, or color. Moles can be many colors including skin-tone, pink, tan, brown, and very dark brown to black. Moles can be raised or flat. Moles can have hair. Moles can grow on any skin surface, including the scalp, hands and feet. When someone is born with a mole, or develops one in the first months of life, the mole is called a congenital, or birthmark mole. About 1 in 100 people are born with one or more moles. Most people develop their moles later in childhood or adulthood. These are called acquired moles. They are most common on sun exposed areas of skin such as the face, neck, upper body, arms and legs. CHECKING MOLES  Most moles are harmless, but in rare cases moles may become cancerous.  Checking moles and looking for changes is an important step in helping to catch worrisome changes early. Some changes to look for are asymmetry (moles that do not look the same on each half), irregular shapes or borders, uneven color or large size. Also look for any moles that bleed, itch, or become painful. Looking at your skin regularly can help you recognize moles that are more at risk for becoming cancerous. WHEN TO CALL THE DOCTOR  Call your doctor if you see any of the following changes in a mole:       Irregular borders (uneven shape or edges)       Changes in color to black, blue or red.      Changes in the surface texture       Scabs, scaling, irritation or bleeding in the mole    TREATMENT FOR MOLES  Often we can simply look at your moles and tell you if they look worrisome. If we are not concerned about the look of your moles at your appointment, we may measure some moles and take some photos that will allow us to watch for future changes in the moles. TREATMENT FOR MOLES  If a mole is getting irritated frequently, bleeding, difficult to watch due to location or dark color, atypical in appearance, or worrisome, we may perform a skin biopsy. A skin biopsy is a procedure that involves removing the mole so that it can be looked at under a microscope. There are many methods used to remove moles. The method we choose depends on the location of the mole, the size of the mole, and the amount of concern for skin cancer. Generally, removing moles in the dermatologists office is a simple and safe procedure that can be done with local anesthesia. PREVENTION  You can do some things to prevent moles from becoming cancerous:       Try to avoid long periods of time in the sun and severe sunburns. The sun is        especially dangerous between 10:00 am and 4:00 pm.       Use a broad spectrum, water-resistant sun block lotion with an SPF of 30 or        greater.  A broad spectrum lotion blocks both UVA and UVB rays from the sun. Re-apply sunscreen at least every 2 hours and after swimming or sweating.      Take advantage of shade whenever possible. Wear a broad-brimmed hat,        sunglasses, and protective clothing when outdoors.      Do not use tanning beds. This note was created with the assistance of a speech-recognition program.  Although the intention is to generate a document that actually reflects the content of the visit, no guarantees can be provided that every mistake has been identified and corrected by editing. I, Dr. Roslyn Nova, personally performed the services described in this documentation, as scribed by [facility] in my presence, and it is both accurate and complete.      Electronically signed by Francine Sage MD on 1/17/22 at 8:15 AM EST

## 2022-01-19 ENCOUNTER — OFFICE VISIT (OUTPATIENT)
Dept: FAMILY MEDICINE CLINIC | Age: 47
End: 2022-01-19
Payer: COMMERCIAL

## 2022-01-19 VITALS
HEART RATE: 86 BPM | TEMPERATURE: 96.9 F | OXYGEN SATURATION: 96 % | BODY MASS INDEX: 44.68 KG/M2 | DIASTOLIC BLOOD PRESSURE: 70 MMHG | SYSTOLIC BLOOD PRESSURE: 110 MMHG | WEIGHT: 315 LBS

## 2022-01-19 DIAGNOSIS — R73.01 IFG (IMPAIRED FASTING GLUCOSE): ICD-10-CM

## 2022-01-19 DIAGNOSIS — Z11.4 SCREENING FOR HIV (HUMAN IMMUNODEFICIENCY VIRUS): ICD-10-CM

## 2022-01-19 DIAGNOSIS — E66.01 MORBID OBESITY DUE TO EXCESS CALORIES (HCC): Primary | ICD-10-CM

## 2022-01-19 DIAGNOSIS — Z11.59 NEED FOR HEPATITIS C SCREENING TEST: ICD-10-CM

## 2022-01-19 DIAGNOSIS — F20.9 SCHIZOPHRENIA, UNSPECIFIED TYPE (HCC): ICD-10-CM

## 2022-01-19 DIAGNOSIS — F17.200 SMOKER UNMOTIVATED TO QUIT: ICD-10-CM

## 2022-01-19 DIAGNOSIS — Z12.11 SCREENING FOR MALIGNANT NEOPLASM OF COLON: ICD-10-CM

## 2022-01-19 DIAGNOSIS — Z23 IMMUNIZATION DUE: ICD-10-CM

## 2022-01-19 PROCEDURE — G8482 FLU IMMUNIZE ORDER/ADMIN: HCPCS | Performed by: NURSE PRACTITIONER

## 2022-01-19 PROCEDURE — 4004F PT TOBACCO SCREEN RCVD TLK: CPT | Performed by: NURSE PRACTITIONER

## 2022-01-19 PROCEDURE — G8427 DOCREV CUR MEDS BY ELIG CLIN: HCPCS | Performed by: NURSE PRACTITIONER

## 2022-01-19 PROCEDURE — 99214 OFFICE O/P EST MOD 30 MIN: CPT | Performed by: NURSE PRACTITIONER

## 2022-01-19 PROCEDURE — G8417 CALC BMI ABV UP PARAM F/U: HCPCS | Performed by: NURSE PRACTITIONER

## 2022-01-19 PROCEDURE — 90715 TDAP VACCINE 7 YRS/> IM: CPT | Performed by: NURSE PRACTITIONER

## 2022-01-19 PROCEDURE — G0008 ADMIN INFLUENZA VIRUS VAC: HCPCS | Performed by: NURSE PRACTITIONER

## 2022-01-19 PROCEDURE — 90674 CCIIV4 VAC NO PRSV 0.5 ML IM: CPT | Performed by: NURSE PRACTITIONER

## 2022-01-19 PROCEDURE — 90471 IMMUNIZATION ADMIN: CPT | Performed by: NURSE PRACTITIONER

## 2022-01-19 SDOH — ECONOMIC STABILITY: FOOD INSECURITY: WITHIN THE PAST 12 MONTHS, YOU WORRIED THAT YOUR FOOD WOULD RUN OUT BEFORE YOU GOT MONEY TO BUY MORE.: NEVER TRUE

## 2022-01-19 SDOH — ECONOMIC STABILITY: FOOD INSECURITY: WITHIN THE PAST 12 MONTHS, THE FOOD YOU BOUGHT JUST DIDN'T LAST AND YOU DIDN'T HAVE MONEY TO GET MORE.: NEVER TRUE

## 2022-01-19 ASSESSMENT — SOCIAL DETERMINANTS OF HEALTH (SDOH): HOW HARD IS IT FOR YOU TO PAY FOR THE VERY BASICS LIKE FOOD, HOUSING, MEDICAL CARE, AND HEATING?: NOT HARD AT ALL

## 2022-01-19 NOTE — PROGRESS NOTES
[place] [address]  [id number] [facility]  [phone]  [phone]    Brook Hyman is a 48 y.o. male who is here with c/o of:    Chief Complaint: 6 Month Follow-Up      Patient Accompanied by: n/a    HPI - Brook Hyman is here today with c/o:    Patient here for re evaluation of chronic conditions.      Obesity-  He says his diet is poor. Lives in a group home and says he is not served healthy food. No routine exercise. He does walk often.      Schizophrenia-  Follows with [facility] and compliant with medications. He was recently put on Prozac and says he is more calm. Denies SI or HI.     Smoker-  Continues to smoke with no motivation to quit. Smokes <1 pk per day.        Health Maintenance Due   Topic Date Due    Hepatitis C screen  Never done    HIV screen  Never done    DTaP/Tdap/Td vaccine (1 - Tdap) Never done    Colon cancer screen colonoscopy  Never done    Flu vaccine (1) Never done        Patient Active Problem List:     Morbid obesity due to excess calories ([address]     Homicidal ideation     Schizophrenia ([facility] [address]     Smoker unmotivated to quit     Past Medical History:   Diagnosis Date    Depression     Hypertriglyceridemia     Obesity       Past Surgical History:   Procedure Laterality Date    TONSILLECTOMY       Family History   Problem Relation Age of Onset    Cancer Mother         breast    Other Father         COPD    Cancer Father         prostate    Diabetes Sister      Social History     Tobacco Use    Smoking status: Current Every Day Smoker     Packs/day: 1.00     Years: 29.00     Pack years: 29.00     Types: Cigarettes    Smokeless tobacco: Never Used   Substance Use Topics    Alcohol use: No     ALLERGIES:    Allergies   Allergen Reactions    Codeine Other (See Comments)     hallucinates           Subjective   Review of Systems   · Constitutional:  Negative for activity change, appetite change,unexpected weight change, chills, fever, and fatigue. · HENT: Negative for ear pain, sore throat,  Rhinorrhea, sinus pain, sinus pressure, congestion. · Eyes:  Negative for pain and discharge. · Respiratory:  Negative for chest tightness, shortness of breath, wheezing, and cough. · Cardiovascular:  Negative for chest pain, palpitations and leg swelling. · Gastrointestinal: Negative for abdominal pain, blood in stool, constipation,diarrhea, nausea and vomiting. · Endocrine: Negative for cold intolerance, heat intolerance, polydipsia, polyphagia and polyuria. · Genitourinary: Negative for difficulty urinating, dysuria, flank pain, frequency, hematuria and urgency. · Musculoskeletal: Negative for arthralgias, back pain, joint swelling, myalgias, neck pain and neck stiffness. · Skin: Negative for rash and wound. · Allergic/Immunologic: Negative for environmental allergies and food allergies. · Neurological:  Negative for dizziness, light-headedness, numbness and headaches. · Hematological:  Negative for adenopathy. Does not bruise/bleed easily. · Psychiatric/Behavioral: Negative for self-injury, sleep disturbance and suicidal ideas. Objective   Physical Exam   PHYSICAL EXAM:   · Constitutional: Grady Kilgore is oriented to person, place, and time. Vital signs are normal. Appears well-developed and well-nourished. He is obese   · Eyes:PERRL, EOMI, Conjunctiva normal, No discharge. · Neck: Full passive range of motion. Non-tender on palpation. Neck supple. No thyromegaly present. Trachea normal.  · Cardiovascular: Normal rate, regular rhythm, S1, S2, no murmur, no gallop, no friction rub, intact distal pulses. · Pulmonary/Chest: Breath sounds are clear throughout, No respiratory distress, No wheezing, No chest tenderness. Effort normal  · Abdominal: Soft. Normal appearance, bowel sounds are present and normoactive. There is no hepatosplenomegaly. There is no tenderness. There is no CVA tenderness.    · Musculoskeletal: Extremities appear regular and symmetric. No evident masses, lesions, foreign bodies, or other abnormalities. No edema. No tenderness on palpation. Joints are stable. Full ROM, strength and tone are within normal limits. · Neurological: Alert and oriented to person, place, and time. Normal motor function, Normal sensory function, No focal deficits noted. He has normal strength. · Skin: Skin is warm, dry and intact. No obvious lesions on exposed skin  · Psychiatric: Normal mood and affect. Speech is normal and behavior is normal.     Nursing note and vitals reviewed. Blood pressure 110/70, pulse 86, temperature 96.9 °F (36.1 °C), temperature source Temporal, weight (!) 348 lb (157.9 kg), SpO2 96 %. Body mass index is 44.68 kg/m².     Wt Readings from Last 3 Encounters:   01/19/22 (!) 348 lb (157.9 kg)   01/17/22 (!) 337 lb 9.6 oz (153.1 kg)   11/29/21 (!) 334 lb (151.5 kg)     BP Readings from Last 3 Encounters:   01/19/22 110/70   01/17/22 132/80   12/02/21 123/87       Admission on 12/02/2021, Discharged on 12/02/2021   Component Date Value Ref Range Status    Color, UA 12/02/2021 Yellow  Yellow Final    Turbidity UA 12/02/2021 Clear  Clear Final    Glucose, Ur 12/02/2021 NEGATIVE  NEGATIVE Final    Bilirubin Urine 12/02/2021 NEGATIVE  NEGATIVE Final    Ketones, Urine 12/02/2021 NEGATIVE  NEGATIVE Final    Specific Gravity, UA 12/02/2021 1.003* 1.005 - 1.030 Final    Urine Hgb 12/02/2021 NEGATIVE  NEGATIVE Final    pH, UA 12/02/2021 6.5  5.0 - 8.0 Final    Protein, UA 12/02/2021 NEGATIVE  NEGATIVE Final    Urobilinogen, Urine 12/02/2021 Normal  Normal Final    Nitrite, Urine 12/02/2021 NEGATIVE  NEGATIVE Final    Leukocyte Esterase, Urine 12/02/2021 NEGATIVE  NEGATIVE Final    - 12/02/2021        Final    WBC, UA 12/02/2021 NOT REPORTED  0 - 5 /HPF Final    RBC, UA 12/02/2021 NOT REPORTED  0 - 4 /HPF Final    Casts UA 12/02/2021 NOT REPORTED  0 - 8 /LPF Final    Crystals, UA 12/02/2021 NOT REPORTED  None /HPF Final    Epithelial Cells UA 12/02/2021 0 TO 2  0 - 5 /HPF Final    Renal Epithelial, UA 12/02/2021 NOT REPORTED  0 /HPF Final    Bacteria, UA 12/02/2021 NOT REPORTED  None Final    Mucus, UA 12/02/2021 NOT REPORTED  None Final    Trichomonas, UA 12/02/2021 NOT REPORTED  None Final    Amorphous, UA 12/02/2021 NOT REPORTED  None Final    Other Observations UA 12/02/2021 NOT REPORTED  NOT REQ. Final    Yeast, UA 12/02/2021 NOT REPORTED  None Final     No results found for this visit on 01/19/22. Completed Orders/Prescriptions   No orders of the defined types were placed in this encounter. AssessmentPlan/Medical Decision Making     1. Morbid obesity due to excess calories (HCC)    - CBC Auto Differential; Future  - Comprehensive Metabolic Panel; Future  - Lipid, Fasting; Future    2. Schizophrenia, unspecified type ([address]    - CBC Auto Differential; Future  - Comprehensive Metabolic Panel; Future    3. IFG (impaired fasting glucose)    - CBC Auto Differential; Future  - Comprehensive Metabolic Panel; Future  - Lipid, Fasting; Future  - Hemoglobin A1C; Future    4. Smoker unmotivated to quit      5. Screening for malignant neoplasm of colon    - Li Hussein MD, Gastroenterology, [address]    6. Screening for HIV (human immunodeficiency virus)    - HIV Screen; Future    7. Need for hepatitis C screening test    - Hepatitis C Antibody; Future    8. Immunization due    - INFLUENZA, MDCK QUADV, 2 YRS AND OLDER, IM, PF, PREFILL SYR OR SDV, 0.5ML (FLUCELVAX QUADV, PF)  - Tdap (age 6y and older) IM ([facility])      Return in about 3 months (around 4/19/2022) for [address] [place] [address]. 1.  Jonny received counseling on the following healthy behaviors: nutrition, exercise and medication adherence  2. Patient given educational materials - see patient instructions  3. Was a self-tracking handout given in paper form or via [employer]? No  If yes, see orders or list here.   4.  Discussed use, benefit, and side effects of prescribed medications. Barriers to medication compliance addressed. All patient questions answered. Pt voiced understanding. 5.  Reviewed prior labs, imaging, consultation, follow up, and health maintenance  6. Continue current medications, diet and exercise. 7. Discussed use, benefit, and side effects of prescribed medications. Barriers to medication compliance addressed. All her questions were answered. Pt voiced understanding. Kian Cheek will continue current medications, diet and exercise. Of the 30 minute duration appointment visit, Sukhjinder Kessler CNP spent at least 50% of the face-to-face time in counseling, explanation of diagnosis, planning of further management, and answering all questions.           Signed:  Sukhjinder Kessler CNP

## 2022-02-17 ENCOUNTER — TELEPHONE (OUTPATIENT)
Dept: GASTROENTEROLOGY | Age: 47
End: 2022-02-17

## 2022-02-17 DIAGNOSIS — Z12.11 ENCOUNTER FOR SCREENING FOR MALIGNANT NEOPLASM OF COLON: Primary | ICD-10-CM

## 2022-02-17 NOTE — TELEPHONE ENCOUNTER
Pt is not an est pt of [facility]. Writer called  [phone] & person who answered states to call him on his cell phone # and they gave pt's cell number. Writer called & spoke to pt over the phone in regards to scheduling colon GI referral.  After going through colon screen questionnaire w/ pt, he is sched for colon w/ Constance at [address] 3/9/22 @ 9:45am proc time, 8:15am arrival time. Dulcolax, Miralax & Mag Citrate orders will be sent to [address] [place] [address] in [place], [place]. Bowel prep instructions given to pt over the phone and hard copy mailed to pt's home address. Pt instructed he will need a  and to bring proof of Covid-19 vaccs on proc day. Pt voices his understanding.

## 2022-02-18 RX ORDER — POLYETHYLENE GLYCOL 3350 17 G/17G
POWDER, FOR SOLUTION ORAL
Qty: 238 G | Refills: 0 | Status: SHIPPED | OUTPATIENT
Start: 2022-02-18 | End: 2022-09-21

## 2022-02-23 ENCOUNTER — HOSPITAL ENCOUNTER (OUTPATIENT)
Age: 47
Setting detail: SPECIMEN
Discharge: HOME | End: 2022-02-23

## 2022-02-23 DIAGNOSIS — E66.01 MORBID OBESITY DUE TO EXCESS CALORIES (HCC): ICD-10-CM

## 2022-02-23 DIAGNOSIS — Z11.59 NEED FOR HEPATITIS C SCREENING TEST: ICD-10-CM

## 2022-02-23 DIAGNOSIS — R73.01 IFG (IMPAIRED FASTING GLUCOSE): ICD-10-CM

## 2022-02-23 DIAGNOSIS — Z11.4 SCREENING FOR HIV (HUMAN IMMUNODEFICIENCY VIRUS): ICD-10-CM

## 2022-02-23 DIAGNOSIS — F20.9 SCHIZOPHRENIA, UNSPECIFIED TYPE (HCC): ICD-10-CM

## 2022-02-23 LAB
ABSOLUTE EOS #: 0.18 K/UL (ref 0–0.44)
ABSOLUTE IMMATURE GRANULOCYTE: 0.07 K/UL (ref 0–0.3)
ABSOLUTE LYMPH #: 3.08 K/UL (ref 1.1–3.7)
ABSOLUTE MONO #: 0.52 K/UL (ref 0.1–1.2)
ALBUMIN SERPL-MCNC: 4.4 G/DL (ref 3.5–5.2)
ALBUMIN/GLOBULIN RATIO: 1.8 (ref 1–2.5)
ALP BLD-CCNC: 80 U/L (ref 40–129)
ALT SERPL-CCNC: 18 U/L (ref 5–41)
ANION GAP SERPL CALCULATED.3IONS-SCNC: 14 MMOL/L (ref 9–17)
AST SERPL-CCNC: 19 U/L
BASOPHILS # BLD: 1 % (ref 0–2)
BASOPHILS ABSOLUTE: 0.06 K/UL (ref 0–0.2)
BILIRUB SERPL-MCNC: 0.4 MG/DL (ref 0.3–1.2)
BUN BLDV-MCNC: 13 MG/DL (ref 6–20)
CALCIUM SERPL-MCNC: 9.3 MG/DL (ref 8.6–10.4)
CHLORIDE BLD-SCNC: 105 MMOL/L (ref 98–107)
CHOLESTEROL, FASTING: 152 MG/DL
CHOLESTEROL/HDL RATIO: 4
CO2: 24 MMOL/L (ref 20–31)
CREAT SERPL-MCNC: 0.71 MG/DL (ref 0.7–1.2)
EOSINOPHILS RELATIVE PERCENT: 2 % (ref 1–4)
ESTIMATED AVERAGE GLUCOSE: 123 MG/DL
GFR AFRICAN AMERICAN: >60 ML/MIN
GFR NON-AFRICAN AMERICAN: >60 ML/MIN
GFR SERPL CREATININE-BSD FRML MDRD: NORMAL ML/MIN/{1.73_M2}
GLUCOSE BLD-MCNC: 86 MG/DL (ref 70–99)
HBA1C MFR BLD: 5.9 % (ref 4–6)
HCT VFR BLD CALC: 46.1 % (ref 40.7–50.3)
HDLC SERPL-MCNC: 38 MG/DL
HEMOGLOBIN: 15.4 G/DL (ref 13–17)
HEPATITIS C ANTIBODY: NONREACTIVE
HIV AG/AB: NONREACTIVE
IMMATURE GRANULOCYTES: 1 %
LDL CHOLESTEROL: 91 MG/DL (ref 0–130)
LYMPHOCYTES # BLD: 36 % (ref 24–43)
MCH RBC QN AUTO: 30.2 PG (ref 25.2–33.5)
MCHC RBC AUTO-ENTMCNC: 33.4 G/DL (ref 28.4–34.8)
MCV RBC AUTO: 90.4 FL (ref 82.6–102.9)
MONOCYTES # BLD: 6 % (ref 3–12)
NRBC AUTOMATED: 0 PER 100 WBC
PDW BLD-RTO: 12.4 % (ref 11.8–14.4)
PLATELET # BLD: 202 K/UL (ref 138–453)
PMV BLD AUTO: 11.1 FL (ref 8.1–13.5)
POTASSIUM SERPL-SCNC: 4.5 MMOL/L (ref 3.7–5.3)
RBC # BLD: 5.1 M/UL (ref 4.21–5.77)
SEG NEUTROPHILS: 54 % (ref 36–65)
SEGMENTED NEUTROPHILS ABSOLUTE COUNT: 4.57 K/UL (ref 1.5–8.1)
SODIUM BLD-SCNC: 143 MMOL/L (ref 135–144)
TOTAL PROTEIN: 6.8 G/DL (ref 6.4–8.3)
TRIGLYCERIDE, FASTING: 114 MG/DL
WBC # BLD: 8.5 K/UL (ref 3.5–11.3)

## 2022-02-23 NOTE — TELEPHONE ENCOUNTER
Writer returned pt's phone call & spoke to pt over the phone in regards to rescheduling 3/9/22 colon proc. Pt states he does not want to reschedule. He states he does not want to do colon proc. Writer informed pt we will cancel proc and send back referral to ordering dr ofc and when pt is ready to sched colon to have the dr ofc send a new referral.  Pt agreeable to plan and voices his understanding.

## 2022-03-02 ENCOUNTER — OFFICE VISIT (OUTPATIENT)
Dept: PODIATRY | Age: 47
End: 2022-03-02
Payer: COMMERCIAL

## 2022-03-02 VITALS — HEIGHT: 74 IN | BODY MASS INDEX: 40.43 KG/M2 | RESPIRATION RATE: 18 BRPM | WEIGHT: 315 LBS

## 2022-03-02 DIAGNOSIS — M20.42 HAMMER TOES OF BOTH FEET: ICD-10-CM

## 2022-03-02 DIAGNOSIS — B35.1 DERMATOPHYTOSIS OF NAIL: Primary | ICD-10-CM

## 2022-03-02 DIAGNOSIS — M79.604 BILATERAL LOWER EXTREMITY PAIN: ICD-10-CM

## 2022-03-02 DIAGNOSIS — L60.0 INGROWN NAIL: ICD-10-CM

## 2022-03-02 DIAGNOSIS — M79.605 BILATERAL LOWER EXTREMITY PAIN: ICD-10-CM

## 2022-03-02 DIAGNOSIS — R26.2 DIFFICULTY WALKING: ICD-10-CM

## 2022-03-02 DIAGNOSIS — M20.41 HAMMER TOES OF BOTH FEET: ICD-10-CM

## 2022-03-02 PROCEDURE — G8427 DOCREV CUR MEDS BY ELIG CLIN: HCPCS | Performed by: PODIATRIST

## 2022-03-02 PROCEDURE — G8482 FLU IMMUNIZE ORDER/ADMIN: HCPCS | Performed by: PODIATRIST

## 2022-03-02 PROCEDURE — 99213 OFFICE O/P EST LOW 20 MIN: CPT | Performed by: PODIATRIST

## 2022-03-02 PROCEDURE — 4004F PT TOBACCO SCREEN RCVD TLK: CPT | Performed by: PODIATRIST

## 2022-03-02 PROCEDURE — G8417 CALC BMI ABV UP PARAM F/U: HCPCS | Performed by: PODIATRIST

## 2022-03-02 PROCEDURE — 11721 DEBRIDE NAIL 6 OR MORE: CPT | Performed by: PODIATRIST

## 2022-03-02 NOTE — PROGRESS NOTES
[address] [facility] PODIATRY [facility]  [address] [address] [address]  Dept: [phone]  Dept Fax: [phone]     PAIN PROGRESS NOTE  Date of patient's visit: 3/2/2022  Patient's Name:  Sonny Delcid YOB: 1975            Patient Care Team:  AMY Swartz CNP as PCP - General (Nurse Practitioner)  AMY Swartz CNP as PCP - [facility] Empaneled Provider  Estefany Lewis DPM as Physician (Podiatry)      Chief Complaint   Patient presents with    Nail Problem       Subjective: This Sonny Delcid comes to clinic for foot and nail care. Pt currently has complaint of thickened, painful, elongated nails that he/she cannot manage by themselves. Pt. Relates pain to nails with shoe gear. Pt's primary care physician is AMY Swartz CNP last seen 01/19/2022. He has new complaint of increased painful ingrown nail. Past Medical History:   Diagnosis Date    Depression     Hypertriglyceridemia     Obesity        Allergies   Allergen Reactions    Codeine Other (See Comments)     hallucinates      Current Outpatient Medications on File Prior to Visit   Medication Sig Dispense Refill    bisacodyl (DULCOLAX) 5 MG EC tablet TAKE 4 TABS AT 10 AM THE DAY PRIOR TO COLONOSCOPY 4 tablet 0    polyethylene glycol (GLYCOLAX) 17 GM/SCOOP powder Use as directed by following your patient instructions given by office. 238 g 0    triamcinolone (KENALOG) 0.1 % ointment Apply to rash on hands twice daily (not face, armpit or groin) 80 g 3    ciclopirox (PENLAC) 8 % solution Apply topically nightly. Remove once weekly with alcohol or nail polish remover. 1 each 3    desoximetasone (TOPICORT) 0.25 % cream Apply topically 2 times daily.  60 g 1    FLUoxetine (PROZAC) 10 MG capsule daily      mupirocin (BACTROBAN) 2 % cream       ARIPiprazole (ABILIFY) 5 MG tablet Take 5 mg by mouth daily      Multiple Vitamins-Minerals (THERAPEUTIC MULTIVITAMIN-MINERALS) tablet Take 1 tablet by mouth daily      Ascorbic Acid (VITAMIN C PO) Take by mouth      magnesium citrate solution Take 296 mLs by mouth once for 1 dose 296 mL 0     No current facility-administered medications on file prior to visit. Review of Systems. Review of Systems:   History obtained from chart review and the patient  General ROS: negative for - chills, fatigue, fever, night sweats or weight gain  Constitutional: Negative for chills, diaphoresis, fatigue, fever and unexpected weight change. Musculoskeletal: Positive for arthralgias, gait problem and joint swelling. Neurological ROS: negative for - behavioral changes, confusion, headaches or seizures. Negative for weakness and numbness. Dermatological ROS: negative for - mole changes, rash  Cardiovascular: Negative for leg swelling. Gastrointestinal: Negative for constipation, diarrhea, nausea and vomiting. Objective:  Dermatologic Exam:  michael hallux nails are incurvated with increased edema    Skin No rashes or nodules noted. .   Skin is thin, with flaky sloughing skin as well as decreased hair growth to the lower leg  Small red hemosiderin deposits seen dorsal foot   Musculoskeletal:     1st MPJ ROM decreased, Bilateral.  Muscle strength 5/5, Bilateral.  Pain present upon palpation of toenails 1-5, Bilateral. decreased medial longitudinal arch, Bilateral.  Ankle ROM decreased,Bilateral.    Dorsally contracted digits present digits 2, Bilateral.     Vascular: DP pulses 1/4 bilateral.  PT pulses 0/4 bilateral.   CFT <5 seconds, Bilateral.  Hair growth absent to the level of the digits, Bilateral.  Edema present, Bilateral.  Varicosities absent, Bilateral. Erythema absent, Bilateral    Neurological: Sensation diminshed to light touch to level of digits, Bilateral.  Protective sensation intact 6/10 sites via 5.07/10g Edinburgh-Mars Monofilament, Bilateral.  negative Tinel's, Bilateral.  negative Valleix sign, Bilateral.      Integument: Warm, dry, supple, Bilateral.  Open lesion absent, Bilateral.  Interdigital maceration absent to web spaces 4, Bilateral.  Nails 1-5 left and 1-5 right thickened > 3.0 mm, dystrophic and crumbly, discolored with subungual debris. Fissures absent, Bilateral.   General: AAO x 3 in NAD. Derm  Toenail Description  Sites of Onychomycosis Involvement (Check affected area)  [x] [x] [x] [x] [x] [x] [x] [x] [x] [x]  5 4 3 2 1 1 2 3 4 5                          Right                                        Left    Thickness  [x] [x] [x] [x] [x] [x] [x] [x] [x] [x]  5 4 3 2 1 1 2 3 4 5                         Right                                        Left    Dystrophic Changes   [x] [x] [x] [x] [x] [x] [x] [x] [x] [x]  5 4 3 2 1 1 2 3 4 5                         Right                                        Left    Color  [x] [x] [x] [x] [x] [x] [x] [x] [x] [x]  5 4 3 2 1 1 2 3 4 5                          Right                                        Left    Incurvation/Ingrowin   [] [] [] [] [] [] [] [] [] []  5 4 3 2 1 1 2 3 4 5                         Right                                        Left    Inflammation/Pain   [x] [x] [x] [x] [x] [x] [x] [x] [x] [x]  5 4 3  2 1 1 2 3 4 5                         Right                                        Left       Nails are painful 1-10 with direct palpation. Q7   []Yes  []No                Q8   [x]Yes  []No                     Q9   []Yes    []No  Assessment:  48 y.o. male with:    Diagnosis Orders   1. Dermatophytosis of nail  00434 - [place] DEBRIDEMENT OF NAILS, 6 OR MORE   2. Bilateral lower extremity pain  54428 - [place] DEBRIDEMENT OF NAILS, 6 OR MORE   3. Difficulty walking  97001 - [place] DEBRIDEMENT OF NAILS, 6 OR MORE   4. Hammer toes of both feet  50458 - [place] DEBRIDEMENT OF NAILS, 6 OR MORE   5. Ingrown nail           Plan:   Pt was evaluated and examined. Patient was given personalized discharge instructions.  Slant back procedure perform on nail border using nail nipper to patients tolerance. Advised pt to consider nail avulsion at next visit if symptoms persist or worsen. Pt to monitor for increased signs of infection such as erythema, edema and drainage. Nails 1-10 were debrided in length and thickness sharply with a nail nipper and  without incident. Pt will follow up in 9 weeks or sooner if any problems arise. Diagnosis was discussed with the pt and all of their questions were answered in detail. Proper foot hygiene and care was discussed with the pt. Patient to check feet daily and contact the office with any questions/problems/concerns. Other comorbidity noted and will be managed by PCP. Pain waiver discussed with patient and confirmed.    3/2/2022      Electronically signed by Olena Gonzalez DPM on 3/2/2022 at 2:50 PM  3/2/2022

## 2022-03-08 ENCOUNTER — TELEPHONE (OUTPATIENT)
Dept: FAMILY MEDICINE CLINIC | Age: 47
End: 2022-03-08

## 2022-03-08 NOTE — TELEPHONE ENCOUNTER
----- Message from Gerry Huggins sent at 3/8/2022  1:56 PM EST -----  Subject: Referral Request    QUESTIONS   Reason for referral request? Patient received a letter from his insurance   to schedule a bone density test and would like one on file and for the   office to contact him once that is on file   Has the physician seen you for this condition before? No   Preferred Specialist (if applicable)? Do you already have an appointment scheduled? No  Additional Information for Provider?   ---------------------------------------------------------------------------  --------------  CALL BACK INFO  What is the best way for the office to contact you? OK to leave message on   voicemail  Preferred Call Back Phone Number?  [phone]

## 2022-03-08 NOTE — TELEPHONE ENCOUNTER
ECC : Patient was directed by his dermatologist to get   a depression screening and was wondering if his provider in the office   schedules those or if he should go somewhere else for that.

## 2022-03-08 NOTE — TELEPHONE ENCOUNTER
----- Message from Geraldine Colin sent at 3/8/2022  1:58 PM EST -----  Subject: Referral Request    QUESTIONS   Reason for referral request? Patient's states insurance is also asking for   a HB A1C test and a Kidney screening. Patient would like orders for this   on file as well so he can complete these. Has the physician seen you for this condition before? No   Preferred Specialist (if applicable)? Do you already have an appointment scheduled? No  Additional Information for Provider?   ---------------------------------------------------------------------------  --------------  CALL BACK INFO  What is the best way for the office to contact you? OK to leave message on   voicemail  Preferred Call Back Phone Number?  [phone] ZOYA

## 2022-05-25 ENCOUNTER — OFFICE VISIT (OUTPATIENT)
Dept: FAMILY MEDICINE CLINIC | Age: 47
End: 2022-05-25
Payer: COMMERCIAL

## 2022-05-25 VITALS
HEART RATE: 93 BPM | HEIGHT: 74 IN | TEMPERATURE: 98.3 F | DIASTOLIC BLOOD PRESSURE: 80 MMHG | WEIGHT: 315 LBS | SYSTOLIC BLOOD PRESSURE: 122 MMHG | BODY MASS INDEX: 40.43 KG/M2 | OXYGEN SATURATION: 95 %

## 2022-05-25 DIAGNOSIS — Z71.89 ADVANCE CARE PLANNING: ICD-10-CM

## 2022-05-25 DIAGNOSIS — Z23 IMMUNIZATION DUE: ICD-10-CM

## 2022-05-25 DIAGNOSIS — Z78.9 TAKES DAILY MULTIVITAMINS: Primary | ICD-10-CM

## 2022-05-25 DIAGNOSIS — Z00.00 INITIAL MEDICARE ANNUAL WELLNESS VISIT: ICD-10-CM

## 2022-05-25 DIAGNOSIS — Z12.11 SCREENING FOR MALIGNANT NEOPLASM OF COLON: ICD-10-CM

## 2022-05-25 PROCEDURE — 90732 PPSV23 VACC 2 YRS+ SUBQ/IM: CPT | Performed by: NURSE PRACTITIONER

## 2022-05-25 PROCEDURE — G0438 PPPS, INITIAL VISIT: HCPCS | Performed by: NURSE PRACTITIONER

## 2022-05-25 PROCEDURE — G0009 ADMIN PNEUMOCOCCAL VACCINE: HCPCS | Performed by: NURSE PRACTITIONER

## 2022-05-25 RX ORDER — M-VIT,TX,IRON,MINS/CALC/FOLIC 27MG-0.4MG
1 TABLET ORAL DAILY
Qty: 30 TABLET | Refills: 5 | Status: SHIPPED | OUTPATIENT
Start: 2022-05-25

## 2022-05-25 RX ORDER — ASCORBIC ACID 500 MG
500 TABLET ORAL DAILY
Qty: 30 TABLET | Refills: 5 | Status: SHIPPED | OUTPATIENT
Start: 2022-05-25 | End: 2022-09-21 | Stop reason: SDUPTHER

## 2022-05-25 ASSESSMENT — PATIENT HEALTH QUESTIONNAIRE - PHQ9
SUM OF ALL RESPONSES TO PHQ QUESTIONS 1-9: 0
2. FEELING DOWN, DEPRESSED OR HOPELESS: 0
SUM OF ALL RESPONSES TO PHQ QUESTIONS 1-9: 0
1. LITTLE INTEREST OR PLEASURE IN DOING THINGS: 0
SUM OF ALL RESPONSES TO PHQ QUESTIONS 1-9: 0
SUM OF ALL RESPONSES TO PHQ9 QUESTIONS 1 & 2: 0
SUM OF ALL RESPONSES TO PHQ QUESTIONS 1-9: 0

## 2022-05-25 ASSESSMENT — LIFESTYLE VARIABLES
HOW OFTEN DO YOU HAVE A DRINK CONTAINING ALCOHOL: NEVER
HOW OFTEN DO YOU HAVE A DRINK CONTAINING ALCOHOL: NEVER

## 2022-05-25 NOTE — PROGRESS NOTES
Medicare Annual Wellness Visit    Ida Nava is here for Medicare AWV    Assessment & Plan   Takes daily multivitamins  -     Multiple Vitamins-Minerals (THERAPEUTIC MULTIVITAMIN-MINERALS) tablet; Take 1 tablet by mouth daily, Disp-30 tablet, R-5Normal  -     vitamin C (ASCORBIC ACID) 500 MG tablet; Take 1 tablet by mouth daily, Disp-30 tablet, R-5Normal  Immunization due  -     Pneumococcal, [id number], PPSV23, (age 2 yrs+), SC/IM  Screening for malignant neoplasm of colon  -     Fecal DNA Colorectal cancer screening (Cologuard)  Advance care [facility] Referral to ACP Clinical Specialist  Initial Medicare annual wellness visit      Recommendations for Preventive Services Due: see orders and patient instructions/AVS.  Recommended screening schedule for the next 5-10 years is provided to the patient in written form: see Patient Instructions/AVS.     Return in 6 months (on 11/25/2022) for chronic conditions. Subjective       Patient's complete Health Risk Assessment and screening values have been reviewed and are found in Flowsheets. The following problems were reviewed today and where indicated follow up appointments were made and/or referrals ordered.     Positive Risk Factor Screenings with Interventions:         Tobacco Use:     Tobacco Use: High Risk    Smoking Tobacco Use: Current Every Day Smoker    Smokeless Tobacco Use: Never Used     E-Cigarettes/Vaping Use     Questions Responses    E-Cigarette/Vaping Use Never User    Start Date     Passive Exposure     Quit Date     Counseling Given     Comments         Substance Use - Tobacco Interventions:  patient is not ready to work toward tobacco cessation at this time           General Health and ACP:  General  In general, how would you say your health is?: Fair  In the past 7 days, have you experienced any of the following: New or Increased Pain, New or Increased Fatigue, Loneliness, Social Isolation, Stress or Anger?: No  Do you get the social Apply topically nightly. Remove once weekly with alcohol or nail polish remover. Yes Krystal Bolivar DPM   desoximetasone (TOPICORT) 0.25 % cream Apply topically 2 times daily.  Yes Magnus Berry MD   FLUoxetine (PROZAC) 10 MG capsule daily Yes Historical Provider, MD   mupirocin (BACTROBAN) 2 % cream  Yes Historical Provider, MD   ARIPiprazole (ABILIFY) 5 MG tablet Take 5 mg by mouth daily Yes Historical Provider, MD   magnesium citrate solution Take 296 mLs by mouth once for 1 dose  Mark Wei MD       [facility] (Including outside providers/suppliers regularly involved in providing care):   Patient Care Team:  AMY Crabtree CNP as PCP - General (Nurse Practitioner)  AMY Crabtree CNP as PCP - [facility] Empaneled Provider  Krystal Bolivar DPM as Physician (Podiatry)     Reviewed and updated this visit:  Tobacco  Allergies  Meds  Problems  Med Hx  Surg Hx  Soc Hx  Fam Hx

## 2022-05-25 NOTE — PATIENT INSTRUCTIONS
Personalized Preventive Plan for Tesha Hendricks - 5/25/2022  Medicare offers a range of preventive health benefits. Some of the tests and screenings are paid in full while other may be subject to a deductible, co-insurance, and/or copay. Some of these benefits include a comprehensive review of your medical history including lifestyle, illnesses that may run in your family, and various assessments and screenings as appropriate. After reviewing your medical record and screening and assessments performed today your provider may have ordered immunizations, labs, imaging, and/or referrals for you. A list of these orders (if applicable) as well as your Preventive Care list are included within your After Visit Summary for your review. Other Preventive Recommendations:    · A preventive eye exam performed by an eye specialist is recommended every 1-2 years to screen for glaucoma; cataracts, macular degeneration, and other eye disorders. · A preventive dental visit is recommended every 6 months. · Try to get at least 150 minutes of exercise per week or 10,000 steps per day on a pedometer . · Order or download the FREE \"Exercise & Physical Activity: Your Everyday Guide\" from [employer]. Call 5-595.217.4701 or search [employer] online. · You need 1042-0456 mg of calcium and 5778-3143 IU of vitamin D per day. It is possible to meet your calcium requirement with diet alone, but a vitamin D supplement is usually necessary to meet this goal.  · When exposed to the sun, use a sunscreen that protects against both UVA and UVB radiation with an SPF of 30 or greater. Reapply every 2 to 3 hours or after sweating, drying off with a towel, or swimming. · Always wear a seat belt when traveling in a car. Always wear a helmet when riding a bicycle or motorcycle.

## 2022-05-26 ENCOUNTER — CLINICAL DOCUMENTATION (OUTPATIENT)
Dept: SPIRITUAL SERVICES | Age: 47
End: 2022-05-26

## 2022-05-26 NOTE — ACP (ADVANCE CARE PLANNING)
Advance Care Planning   Ambulatory ACP Specialist Patient Outreach    Date:  5/26/2022  ACP Specialist:  Cintia Iglesias    Outreach call to patient in follow-up to ACP Specialist referral from: AMY nKight CNP    [x] PCP  [] Provider   [] Ambulatory Care Management [] Other for Reason:    [x] Advance Directive Assistance  [] Code Status Discussion  [] Complete Portable DNR Order  [] Discuss Goals of Care  [] Complete POST/MOST  [] Early ACP Decision-Making  [] Other    Date Referral Received:5/25/22    Today's Outreach:  [x] First   [] Second  [] Third                               First outreach made by [x]  phone  [] email []   [employer]     Intervention:  [] Spoke with Patient  [x] Left VM requesting return call      Outcome: Left message with person other than Patient. Stated Patient not available right now. Called 2nd number listed, has been disconnected. Next Step:   [] ACP scheduled conversation  [x] Outreach again in two week               [] Email / Mail ACP Info Sheets  [] Email / Mail Advance Directive            [] Close Referral. Routing closure to referring provider/staff and to ACP Specialist .      Thank you for this referral.

## 2022-06-09 ENCOUNTER — CLINICAL DOCUMENTATION (OUTPATIENT)
Dept: SPIRITUAL SERVICES | Age: 47
End: 2022-06-09

## 2022-06-09 NOTE — ACP (ADVANCE CARE PLANNING)
Advance Care Planning   Ambulatory ACP Specialist Patient Outreach    Date:  6/9/2022  ACP Specialist:  Lester Maier    Outreach call to patient in follow-up to ACP Specialist referral from: AMY Lopez CNP    [x] PCP  [] Provider   [] Ambulatory Care Management [] Other for Reason:    [x] Advance Directive Assistance  [] Code Status Discussion  [] Complete Portable DNR Order  [] Discuss Goals of Care  [] Complete POST/MOST  [] Early ACP Decision-Making  [] Other    Date Referral Received:5/25/22    Today's Outreach:  [] First   [x] Second  [] Third                               Second outreach made by [x]  phone  [x] email [x]   [employer]     Intervention:  [] Spoke with Patient  [x] Left VM requesting return call      Outcome: Called home number listed. A person other than the Patient answered phone and stated Patient no longer lives at this number. Person states that Patient now lives with Mother. Person was asked for Patient's Mother's phone number. Person stated does not have that number. Left brief message with Person to give to Patient. Called second number listed, number has been disconnected. No other numbers listed to reach Patient. Sent Email w/ACP Packet Attached. Queue-it. Next Step:   [] ACP scheduled conversation  [x] Outreach again in two week               [x] Email / Mail ACP Info Sheets  [x] Email / Mail Advance Directive            [] Close Referral. Routing closure to referring provider/staff and to ACP Specialist .      Thank you for this referral.

## 2022-06-23 ENCOUNTER — CLINICAL DOCUMENTATION (OUTPATIENT)
Dept: SPIRITUAL SERVICES | Age: 47
End: 2022-06-23

## 2022-06-23 NOTE — ACP (ADVANCE CARE PLANNING)
Advance Care Planning   Ambulatory ACP Specialist Patient Outreach    Date:  6/23/2022  ACP Specialist:  Shar Raines    Outreach call to patient in follow-up to ACP Specialist referral from: AMY Zuniga CNP    [x] PCP  [] Provider   [] Ambulatory Care Management [] Other for Reason:    [x] Advance Directive Assistance  [] Code Status Discussion  [] Complete Portable DNR Order  [] Discuss Goals of Care  [] Complete POST/MOST  [] Early ACP Decision-Making  [] Other    Date Referral Received:5/25/22    Today's Outreach:  [] First   [] Second  [x] Third                               Third outreach made by [x]  phone  [x] email [x]   [employer]     Intervention:  [] Spoke with Patient  [x] Left VM requesting return call      Outcome: Third Outreach. Left detailed VM for Patient to return call. Sent Closure Letter via Email w/ACP Packet included. [employer]. Next Step:   [] ACP scheduled conversation  [] Outreach again in one week               [x] Email / Mail ACP Info Sheets  [x] Email / Mail Advance Directive            [x] Close Referral. Routing closure to referring provider/staff and to ACP Specialist .      Thank you for this referral.

## 2022-06-24 ENCOUNTER — OFFICE VISIT (OUTPATIENT)
Dept: PODIATRY | Age: 47
End: 2022-06-24
Payer: COMMERCIAL

## 2022-06-24 VITALS — BODY MASS INDEX: 40.43 KG/M2 | HEIGHT: 74 IN | WEIGHT: 315 LBS

## 2022-06-24 DIAGNOSIS — M20.41 HAMMER TOES OF BOTH FEET: ICD-10-CM

## 2022-06-24 DIAGNOSIS — L85.3 XEROSIS CUTIS: ICD-10-CM

## 2022-06-24 DIAGNOSIS — M79.604 BILATERAL LOWER EXTREMITY PAIN: ICD-10-CM

## 2022-06-24 DIAGNOSIS — L60.0 INGROWN NAIL: ICD-10-CM

## 2022-06-24 DIAGNOSIS — I87.2 CHRONIC VENOUS INSUFFICIENCY: ICD-10-CM

## 2022-06-24 DIAGNOSIS — M20.42 HAMMER TOES OF BOTH FEET: ICD-10-CM

## 2022-06-24 DIAGNOSIS — M79.605 BILATERAL LOWER EXTREMITY PAIN: ICD-10-CM

## 2022-06-24 DIAGNOSIS — B35.1 DERMATOPHYTOSIS OF NAIL: Primary | ICD-10-CM

## 2022-06-24 DIAGNOSIS — R26.2 DIFFICULTY WALKING: ICD-10-CM

## 2022-06-24 PROCEDURE — 11721 DEBRIDE NAIL 6 OR MORE: CPT | Performed by: PODIATRIST

## 2022-06-24 PROCEDURE — 99213 OFFICE O/P EST LOW 20 MIN: CPT | Performed by: PODIATRIST

## 2022-06-24 PROCEDURE — G8417 CALC BMI ABV UP PARAM F/U: HCPCS | Performed by: PODIATRIST

## 2022-06-24 PROCEDURE — G8427 DOCREV CUR MEDS BY ELIG CLIN: HCPCS | Performed by: PODIATRIST

## 2022-06-24 PROCEDURE — 4004F PT TOBACCO SCREEN RCVD TLK: CPT | Performed by: PODIATRIST

## 2022-06-24 NOTE — PROGRESS NOTES
[address] [facility] PODIATRY [facility]  [address] [address] [address]  Dept: [phone]  Dept Fax: [phone]     PAIN PROGRESS NOTE  Date of patient's visit: 6/24/2022  Patient's Name:  Stew Hernandez YOB: 1975            Patient Care Team:  AMY Porter CNP as PCP - General (Nurse Practitioner)  AMY Porter CNP as PCP - [facility] Empaneled Provider  Raymondo Galeazzi, DPM as Physician (Podiatry)      Chief Complaint   Patient presents with    Foot Pain     bilateral foot    Nail Problem     toenail trim       Subjective: This Stew Hernandez comes to clinic for foot and nail care. Pt currently has complaint of thickened, painful, elongated nails that he/she cannot manage by themselves. Pt. Relates pain to nails with shoe gear. Pt's primary care physician is AMY Porter CNP last seen 5/25/22. Pt has a new complaint of increased painful ingrown nail with increased dry skin to michael feet. Past Medical History:   Diagnosis Date    Depression     Hypertriglyceridemia     Obesity        Allergies   Allergen Reactions    Codeine Other (See Comments)     hallucinates      Current Outpatient Medications on File Prior to Visit   Medication Sig Dispense Refill    Multiple Vitamins-Minerals (THERAPEUTIC MULTIVITAMIN-MINERALS) tablet Take 1 tablet by mouth daily 30 tablet 5    vitamin C (ASCORBIC ACID) 500 MG tablet Take 1 tablet by mouth daily 30 tablet 5    bisacodyl (DULCOLAX) 5 MG EC tablet TAKE 4 TABS AT 10 AM THE DAY PRIOR TO COLONOSCOPY 4 tablet 0    polyethylene glycol (GLYCOLAX) 17 GM/SCOOP powder Use as directed by following your patient instructions given by office. 238 g 0    triamcinolone (KENALOG) 0.1 % ointment Apply to rash on hands twice daily (not face, armpit or groin) 80 g 3    ciclopirox (PENLAC) 8 % solution Apply topically nightly. Remove once weekly with alcohol or nail polish remover.  1 each 3    desoximetasone (TOPICORT) 0.25 % cream Apply topically 2 times daily. 60 g 1    FLUoxetine (PROZAC) 10 MG capsule daily      mupirocin (BACTROBAN) 2 % cream       ARIPiprazole (ABILIFY) 5 MG tablet Take 5 mg by mouth daily      magnesium citrate solution Take 296 mLs by mouth once for 1 dose 296 mL 0     No current facility-administered medications on file prior to visit. Review of Systems. Review of Systems:   History obtained from chart review and the patient  General ROS: negative for - chills, fatigue, fever, night sweats or weight gain  Constitutional: Negative for chills, diaphoresis, fatigue, fever and unexpected weight change. Musculoskeletal: Positive for arthralgias, gait problem and joint swelling. Neurological ROS: negative for - behavioral changes, confusion, headaches or seizures. Negative for weakness and numbness. Dermatological ROS: negative for - mole changes, rash  Cardiovascular: Negative for leg swelling. Gastrointestinal: Negative for constipation, diarrhea, nausea and vomiting. Objective:  Dermatologic Exam:  michael hallux nails incurvated with edema    Skin No rashes or nodules noted. .   Skin is thin, with flaky sloughing skin as well as decreased hair growth to the lower leg  Small red hemosiderin deposits seen dorsal foot   Musculoskeletal:     1st MPJ ROM decreased, Bilateral.  Muscle strength 5/5, Bilateral.  Pain present upon palpation of toenails 1-5, Bilateral. decreased medial longitudinal arch, Bilateral.  Ankle ROM decreased,Bilateral.    Dorsally contracted digits present digits 2, Bilateral.     Vascular: DP pulses 1/4 bilateral.  PT pulses 0/4 bilateral.   CFT <5 seconds, Bilateral.  Hair growth absent to the level of the digits, Bilateral.  Edema present, Bilateral.  Varicosities absent, Bilateral. Erythema absent, Bilateral    Neurological: Sensation diminshed to light touch to level of digits, Bilateral.  Protective sensation intact 6/10 sites via 5.07/10g [place] Monofilament, Bilateral.  negative Tinel's, Bilateral.  negative Valleix sign, Bilateral.      Integument: Warm, dry, supple, Bilateral.  Open lesion absent, Bilateral.  Interdigital maceration absent to web spaces 4, Bilateral.  Nails 1-5 left and 1-5 right thickened > 3.0 mm, dystrophic and crumbly, discolored with subungual debris. Fissures absent, Bilateral.   General: AAO x 3 in NAD. Derm  Toenail Description  Sites of Onychomycosis Involvement (Check affected area)  [x] [x] [x] [x] [x] [x] [x] [x] [x] [x]  5 4 3 2 1 1 2 3 4 5                          Right                                        Left    Thickness  [x] [x] [x] [x] [x] [x] [x] [x] [x] [x]  5 4 3 2 1 1 2 3 4 5                         Right                                        Left    Dystrophic Changes   [x] [x] [x] [x] [x] [x] [x] [x] [x] [x]  5 4 3 2 1 1 2 3 4 5                         Right                                        Left    Color  [x] [x] [x] [x] [x] [x] [x] [x] [x] [x]  5 4 3 2 1 1 2 3 4 5                          Right                                        Left    Incurvation/Ingrowin   [] [] [] [] [] [] [] [] [] []  5 4 3 2 1 1 2 3 4 5                         Right                                        Left    Inflammation/Pain   [x] [x] [x] [x] [x] [x] [x] [x] [x] [x]  5 4 3  2 1 1 2 3 4 5                         Right                                        Left       Nails are painful 1-10 with direct palpation. Q7   []Yes  []No                Q8   [x]Yes  []No                     Q9   []Yes    []No  Assessment:  48 y.o. male with:    Diagnosis Orders   1. Dermatophytosis of nail  22302 - MO DEBRIDEMENT OF NAILS, 6 OR MORE   2. Bilateral lower extremity pain  95931 - MO DEBRIDEMENT OF NAILS, 6 OR MORE   3. Difficulty walking  98067 - MO DEBRIDEMENT OF NAILS, 6 OR MORE   4. Hammer toes of both feet  47211 - MO DEBRIDEMENT OF NAILS, 6 OR MORE   5.  Ingrown nail  49219 - MO DEBRIDEMENT OF NAILS, 6 OR MORE   6. Chronic venous insufficiency  81740 - ME DEBRIDEMENT OF NAILS, 6 OR MORE   7. Xerosis cutis             Plan:   Pt was evaluated and examined. Patient was given personalized discharge instructions. To address xerosis, patient to apply lachydrin cream to feet daily. Pt to monitor for fissures due to dryness. Advised pt to contact office is there are any open lesions. Nails 1-10 were debrided in length and thickness sharply with a nail nipper and  without incident. Pt will follow up in 9 weeks or sooner if any problems arise. Diagnosis was discussed with the pt and all of their questions were answered in detail. Proper foot hygiene and care was discussed with the pt. Patient to check feet daily and contact the office with any questions/problems/concerns. Other comorbidity noted and will be managed by PCP. Pain waiver discussed with patient and confirmed.    6/24/2022      Electronically signed by Elsa Grijalva DPM on 6/24/2022 at 10:30 AM  6/24/2022

## 2022-06-24 NOTE — PATIENT INSTRUCTIONS
Schedule a Vaccine  When you qualify to receive the vaccine, call the [facility]) COVID-19 Vaccination Hotline to schedule your appointment or to get additional information about the [facility]) locations which are offering the COVID-19 vaccine. To be 94% effective, it's important that you receive two doses of one of the COVID-19 vaccines. -If you are receiving the Warner Peter vaccine, your second shot will be scheduled as close to 21 days after the first shot as possible. -If you are receiving the Moderna vaccine, your second shot will be scheduled as close to 28 days after the first shot as possible. [facility]) COVID-19 Vaccination Hotline: [phone]    Links to [facility]) website and [facility] website:    [email or web address]    [email or web address]

## 2022-07-18 ENCOUNTER — OFFICE VISIT (OUTPATIENT)
Dept: DERMATOLOGY | Age: 47
End: 2022-07-18
Payer: COMMERCIAL

## 2022-07-18 ENCOUNTER — HOSPITAL ENCOUNTER (OUTPATIENT)
Age: 47
Setting detail: SPECIMEN
Discharge: HOME | End: 2022-07-18

## 2022-07-18 VITALS
HEART RATE: 90 BPM | DIASTOLIC BLOOD PRESSURE: 81 MMHG | WEIGHT: 315 LBS | HEIGHT: 74 IN | SYSTOLIC BLOOD PRESSURE: 121 MMHG | BODY MASS INDEX: 40.43 KG/M2 | OXYGEN SATURATION: 96 % | TEMPERATURE: 97.1 F

## 2022-07-18 DIAGNOSIS — L73.9 FOLLICULITIS: ICD-10-CM

## 2022-07-18 DIAGNOSIS — L40.9 PSORIASIS: Primary | ICD-10-CM

## 2022-07-18 PROCEDURE — G8417 CALC BMI ABV UP PARAM F/U: HCPCS | Performed by: PHYSICIAN ASSISTANT

## 2022-07-18 PROCEDURE — 4004F PT TOBACCO SCREEN RCVD TLK: CPT | Performed by: PHYSICIAN ASSISTANT

## 2022-07-18 PROCEDURE — 99213 OFFICE O/P EST LOW 20 MIN: CPT | Performed by: PHYSICIAN ASSISTANT

## 2022-07-18 PROCEDURE — G8427 DOCREV CUR MEDS BY ELIG CLIN: HCPCS | Performed by: PHYSICIAN ASSISTANT

## 2022-07-18 RX ORDER — CLOBETASOL PROPIONATE 0.5 MG/G
OINTMENT TOPICAL
Qty: 60 G | Refills: 2 | Status: SHIPPED | OUTPATIENT
Start: 2022-07-18 | End: 2022-09-21

## 2022-07-18 NOTE — PROGRESS NOTES
Dermatology Patient Note  [address]  [address] [facility] [address]  Dept: [phone]  Dept Fax: [phone]      VISITDATE: 7/18/2022   REFERRING PROVIDER: No ref. provider found      Anabelle Guevara is a 48 y.o. male  who presents today in the office for:    Follow-up (Pt states he has a ringworm that turned into eczema on both hands. Pt states he woke up with a lump on his rt lower back. )      HISTORY OF PRESENT ILLNESS:  As above. MEDICAL PROBLEMS:  Patient Active Problem List    Diagnosis Date Noted    IFG (impaired fasting glucose) 01/19/2022    Smoker unmotivated to quit 12/21/2020    Schizophrenia ([facility] 08/24/2018    Homicidal ideation 08/22/2018    Morbid obesity due to excess calories ([facility] 03/27/2017       CURRENT MEDICATIONS:   Current Outpatient Medications   Medication Sig Dispense Refill    clobetasol (TEMOVATE) 0.05 % ointment Apply to hands, nightly, as needed. Apply under occlusive gloves (non-Latex) when using. 60 g 2    benzoyl peroxide 5 % external liquid Wash affected areas once daily 227 g 3    Multiple Vitamins-Minerals (THERAPEUTIC MULTIVITAMIN-MINERALS) tablet Take 1 tablet by mouth daily 30 tablet 5    vitamin C (ASCORBIC ACID) 500 MG tablet Take 1 tablet by mouth daily 30 tablet 5    FLUoxetine (PROZAC) 10 MG capsule daily      ARIPiprazole (ABILIFY) 5 MG tablet Take 5 mg by mouth daily      magnesium citrate solution Take 296 mLs by mouth once for 1 dose 296 mL 0    bisacodyl (DULCOLAX) 5 MG EC tablet TAKE 4 TABS AT 10 AM THE DAY PRIOR TO COLONOSCOPY (Patient not taking: Reported on 7/18/2022) 4 tablet 0    polyethylene glycol (GLYCOLAX) 17 GM/SCOOP powder Use as directed by following your patient instructions given by office.  (Patient not taking: Reported on 7/18/2022) 238 g 0    triamcinolone (KENALOG) 0.1 % ointment Apply to rash on hands twice daily (not face, armpit or groin) (Patient not taking: Reported on 7/18/2022) 80 g 3    ciclopirox (PENLAC) 8 % solution Apply topically nightly. Remove once weekly with alcohol or nail polish remover. (Patient not taking: Reported on 7/18/2022) 1 each 3    desoximetasone (TOPICORT) 0.25 % cream Apply topically 2 times daily. (Patient not taking: Reported on 7/18/2022) 60 g 1    mupirocin (BACTROBAN) 2 % cream  (Patient not taking: Reported on 7/18/2022)       No current facility-administered medications for this visit. ALLERGIES:   Allergies   Allergen Reactions    Codeine Other (See Comments)     hallucinates        SOCIAL HISTORY:  Social History     Tobacco Use    Smoking status: Every Day     Packs/day: 1.00     Years: 29.00     Pack years: 29.00     Types: Cigarettes    Smokeless tobacco: Never   Substance Use Topics    Alcohol use: No       Pertinent ROS:  Review of Systems  Skin: Denies any new changing, growing or bleeding lesions or rashes except as described in the HPI   Constitutional: Denies fevers, chills, and malaise. PHYSICAL EXAM:   /81   Pulse 90   Temp 97.1 °F (36.2 °C) (Temporal)   Ht 6' 2\" (1.88 m)   Wt (!) 345 lb 3.2 oz (156.6 kg)   SpO2 96%   BMI 44.32 kg/m²     The patient is generally well appearing, well nourished, alert and conversational. Affect is normal.    Cutaneous Exam:  Physical Exam  Total body skin exam excluding external genitalia: head/face, neck, both arms, chest, back, abdomen, both legs, buttocks, digits and/or nails, was examined. Genital exam was deferred as patient denied having any lesions in this area. Complete visualization of scalp may be limited by hair density, length, and/or style    Facial covering was removed during examination. Diagnoses/exam findings/medical history pertinent to this visit are listed below:    Assessment:   Diagnosis Orders   1. Psoriasis  clobetasol (TEMOVATE) 0.05 % ointment      2.  Folliculitis  Culture, Wound    benzoyl peroxide 5 % external liquid Plan:  1. Folliculitis  - Culture, Wound; Future  - benzoyl peroxide 5 % external liquid; Wash affected areas once daily  Dispense: 227 g; Refill: 3    2. Psoriasis  - chronic illness, responding to treatment but not yet at goal   - clobetasol (TEMOVATE) 0.05 % ointment; Apply to hands, nightly, as needed. Apply under occlusive gloves (non-Latex) when using. Dispense: 60 g; Refill: 2      RTC 3M    Future Appointments   Date Time Provider Sharon Veras   8/31/2022 11:00 AM Jarrod Zepeda DPM [facility]   10/18/2022  3:15 PM Zack Chand PA-C  derm [facility]   11/28/2022  2:00 PM AMY Blankenship - CNP [facility]         There are no Patient Instructions on file for this visit.       Electronically signed by Zack Chand PA-C on 7/18/22 at 4:28 PM EDT

## 2022-07-19 DIAGNOSIS — L73.9 FOLLICULITIS: ICD-10-CM

## 2022-07-20 LAB
CULTURE: NO GROWTH
DIRECT EXAM: NORMAL
DIRECT EXAM: NORMAL
SPECIMEN DESCRIPTION: NORMAL

## 2022-08-11 ENCOUNTER — TELEPHONE (OUTPATIENT)
Dept: FAMILY MEDICINE CLINIC | Age: 47
End: 2022-08-11

## 2022-08-11 DIAGNOSIS — Z74.09 NEED FOR ASSISTANCE DUE TO REDUCED MOBILITY: Primary | ICD-10-CM

## 2022-08-11 NOTE — TELEPHONE ENCOUNTER
----- Message from Maddy Mishra sent at 8/11/2022  8:48 AM EDT -----  Subject: Message to Provider    QUESTIONS  Information for Provider? Patient would like to get a mobility test to see   if he qualifies for a scooter. wants to use the company [employer].   ---------------------------------------------------------------------------  --------------  [id number]  [phone]; OK to leave message on voicemail  ---------------------------------------------------------------------------  --------------  SCRIPT ANSWERS  Relationship to Patient?  Self

## 2022-08-16 ENCOUNTER — CLINICAL DOCUMENTATION (OUTPATIENT)
Dept: SPIRITUAL SERVICES | Age: 47
End: 2022-08-16

## 2022-08-16 NOTE — ACP (ADVANCE CARE PLANNING)
Advance Care Planning   Ambulatory ACP Specialist Patient Outreach    Date:  8/16/2022  ACP Specialist:  TAJ Perez    Outreach call to patient in follow-up to ACP Specialist referral from: Gaetano Cordero, [facility]   Ambulatory ACP Specialist Patient Outreach    Date:  8/16/2022  ACP Specialist:  TAJ Perez    Outreach call to patient in follow-up to ACP Specialist referral from: AMY Vergara - CNP    [] PCP  [] Provider   [] Ambulatory Care Management [] Other for Reason:    [x] Advance Directive Assistance  [x] Code Status Discussion  [] Complete Portable DNR Order  [x] Discuss Goals of Care  [] Complete POST/MOST  [x] Early ACP Decision-Making  [] Other    Date Referral Received: 5/25/22    Today's Outreach:  [] First   [] Second  [] Third                               Third outreach made by []  phone  [] email []   THREAT STREAMhart     Intervention:  [x] Spoke with Patient  [] Left VM requesting return call      Outcome:  ACP Specialist spoke to patient and patient's mother, Fernando Rodriguez who is listed as the guardian. Staff confirmed with both patient and mother that current guardianship document is valid. ACP Specialist explained the reason for the referral and with a guardian in place this referral will not be followed. Next Step:   [] ACP scheduled conversation  [] Outreach again in one week               [] Email / Mail ACP Info Sheets  [] Email / Mail Advance Directive            [x] Close Referral. Routing closure to referring provider/staff and to ACP Specialist . [] Closure Letter mailed to Patient with Invitation to Contact ACP Specialist if/when ready.     Thank you for this referral.

## 2022-09-21 ENCOUNTER — OFFICE VISIT (OUTPATIENT)
Dept: FAMILY MEDICINE CLINIC | Age: 47
End: 2022-09-21
Payer: COMMERCIAL

## 2022-09-21 VITALS
WEIGHT: 315 LBS | HEART RATE: 84 BPM | BODY MASS INDEX: 43.65 KG/M2 | OXYGEN SATURATION: 94 % | DIASTOLIC BLOOD PRESSURE: 72 MMHG | TEMPERATURE: 97.1 F | SYSTOLIC BLOOD PRESSURE: 112 MMHG

## 2022-09-21 DIAGNOSIS — Z74.09 IMPAIRED MOBILITY: ICD-10-CM

## 2022-09-21 DIAGNOSIS — Z12.11 SCREENING FOR MALIGNANT NEOPLASM OF COLON: ICD-10-CM

## 2022-09-21 DIAGNOSIS — Z78.9 TAKES DAILY MULTIVITAMINS: ICD-10-CM

## 2022-09-21 DIAGNOSIS — Z09 HOSPITAL DISCHARGE FOLLOW-UP: Primary | ICD-10-CM
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
## 2022-09-21 PROCEDURE — 4004F PT TOBACCO SCREEN RCVD TLK: CPT | Performed by: NURSE PRACTITIONER

## 2022-09-21 PROCEDURE — 99214 OFFICE O/P EST MOD 30 MIN: CPT | Performed by: NURSE PRACTITIONER

## 2022-09-21 PROCEDURE — G8427 DOCREV CUR MEDS BY ELIG CLIN: HCPCS | Performed by: NURSE PRACTITIONER

## 2022-09-21 PROCEDURE — G8417 CALC BMI ABV UP PARAM F/U: HCPCS | Performed by: NURSE PRACTITIONER

## 2022-09-21 RX ORDER — ASCORBIC ACID 500 MG
1000 TABLET ORAL DAILY
Qty: 30 TABLET | Refills: 5 | Status: SHIPPED | OUTPATIENT
Start: 2022-09-21

## 2022-09-21 ASSESSMENT — PATIENT HEALTH QUESTIONNAIRE - PHQ9
SUM OF ALL RESPONSES TO PHQ QUESTIONS 1-9: 0
2. FEELING DOWN, DEPRESSED OR HOPELESS: 0
SUM OF ALL RESPONSES TO PHQ9 QUESTIONS 1 & 2: 0
1. LITTLE INTEREST OR PLEASURE IN DOING THINGS: 0
SUM OF ALL RESPONSES TO PHQ QUESTIONS 1-9: 0

## 2022-09-21 NOTE — PROGRESS NOTES
[place] [address]  [phone] [facility] [address] [facility]  [facility]  [phone]  [phone]    Misael Boone is a 48 y.o. male who is here with c/o of:    Chief Complaint: Follow-up      Patient Accompanied by: n/a    HPI - Misael Boone is here today with c/o:    Patient here for hospital follow up. Was seen at [facility] ED on 9/6 for rash. He was given steroids and says it mostly cleared up but then this morning he says he woke up with it again. Says that someone else in his group home had the same thing. He would also like referral today for a mobility exam to see if he qualifies for a scooter. Health Maintenance Due   Topic Date Due    Colorectal Cancer Screen  Never done    Flu vaccine (1) 09/01/2022        Patient Active Problem List:     Morbid obesity due to excess calories ([address]     Homicidal ideation     Schizophrenia ([facility]     Smoker unmotivated to quit     IFG (impaired fasting glucose)     Past Medical History:   Diagnosis Date    Depression     Hypertriglyceridemia     Obesity       Past Surgical History:   Procedure Laterality Date    TONSILLECTOMY       Family History   Problem Relation Age of Onset    Cancer Mother         breast    Other Father         COPD    Cancer Father         prostate    Diabetes Sister      Social History     Tobacco Use    Smoking status: Every Day     Packs/day: 1.00     Years: 29.00     Pack years: 29.00     Types: Cigarettes    Smokeless tobacco: Never   Substance Use Topics    Alcohol use: No     ALLERGIES:    Allergies   Allergen Reactions    Codeine Other (See Comments)     hallucinates           Subjective   Review of Systems   Constitutional:  Negative for activity change, appetite change,unexpected weight change, chills, fever, and fatigue. HENT: Negative for ear pain, sore throat,  Rhinorrhea, sinus pain, sinus pressure, congestion. Eyes:  Negative for pain and discharge.    Respiratory:  Negative for chest tightness, shortness of breath, wheezing, and cough. Cardiovascular:  Negative for chest pain, palpitations and leg swelling. Gastrointestinal: Negative for abdominal pain, blood in stool, constipation,diarrhea, nausea and vomiting. Endocrine: Negative for cold intolerance, heat intolerance, polydipsia, polyphagia and polyuria. Genitourinary: Negative for difficulty urinating, dysuria, flank pain, frequency, hematuria and urgency. Musculoskeletal: Negative for arthralgias, back pain, joint swelling, myalgias, neck pain and neck stiffness. Skin: Negative for wound. Positive rash  Allergic/Immunologic: Negative for environmental allergies and food allergies. Neurological:  Negative for dizziness, light-headedness, numbness and headaches. Hematological:  Negative for adenopathy. Does not bruise/bleed easily. Psychiatric/Behavioral: Negative for self-injury, sleep disturbance and suicidal ideas. Objective   Physical Exam  Skin:     Comments: Scattered lesions noted to bilateral arms and legs. PHYSICAL EXAM:   Constitutional: Jaja Betancur is oriented to person, place, and time. Vital signs are normal. Appears well-developed and well-nourished. He is obese  Head: Normocephalic and atraumatic. Eyes:PERRL, EOMI, Conjunctiva normal, No discharge. Neck: Full passive range of motion. Non-tender on palpation. Neck supple. No thyromegaly present. Trachea normal.  Cardiovascular: Normal rate, regular rhythm, S1, S2, no murmur, no gallop, no friction rub, intact distal pulses. Pulmonary/Chest: Breath sounds are clear throughout, No respiratory distress, No wheezing, No chest tenderness. Effort normal  Abdominal: Soft. Normal appearance, bowel sounds are present and normoactive. There is no hepatosplenomegaly. There is no tenderness. There is no CVA tenderness. Musculoskeletal: Extremities appear regular and symmetric. No evident masses, lesions, foreign bodies, or other abnormalities. No edema.  No tenderness on palpation. Joints are stable. Full ROM, strength and tone are within normal limits. Neurological: Alert and oriented to person, place, and time. Normal motor function, Normal sensory function, No focal deficits noted. He has normal strength. Psychiatric: Normal mood and affect. Speech is normal and behavior is normal.     Nursing note and vitals reviewed. Blood pressure 112/72, pulse 84, temperature 97.1 °F (36.2 °C), temperature source Temporal, weight (!) 340 lb (154.2 kg), SpO2 94 %. Body mass index is 43.65 kg/m². Wt Readings from Last 3 Encounters:   09/21/22 (!) 340 lb (154.2 kg)   07/18/22 (!) 345 lb 3.2 oz (156.6 kg)   06/24/22 (!) 358 lb (162.4 kg)     BP Readings from Last 3 Encounters:   09/21/22 112/72   07/18/22 121/81   05/25/22 122/80       Hospital Outpatient Visit on 07/18/2022   Component Date Value Ref Range Status    Specimen Description 07/18/2022 . BACK SWAB   Final    Direct Exam 07/18/2022 NO NEUTROPHILS SEEN   Final    Direct Exam 07/18/2022 NO BACTERIA SEEN   Final    Culture 07/18/2022 NO GROWTH   Final     No results found for this visit on 09/21/22. Completed Orders/Prescriptions   Orders Placed This Encounter   Medications    vitamin C (ASCORBIC ACID) 500 MG tablet     Sig: Take 2 tablets by mouth daily     Dispense:  30 tablet     Refill:  5    triamcinolone (KENALOG) 0.1 % ointment     Sig: Apply topically 2 times daily for 7 days     Dispense:  30 g     Refill:  0                 AssessmentPlan/Medical Decision Making     1. Hospital discharge follow-up      2. Bedbug bite, subsequent encounter    - triamcinolone (KENALOG) 0.1 % ointment; Apply topically 2 times daily for 7 days  Dispense: 30 g; Refill: 0    3. Impaired mobility    - [facility]    4. Takes daily multivitamins    - vitamin C (ASCORBIC ACID) 500 MG tablet; Take 2 tablets by mouth daily  Dispense: 30 tablet; Refill: 5    5.  Screening for malignant neoplasm of colon    - Fecal DNA Colorectal cancer screening ([employer])    Return for as scheduled. 1.  Jonny received counseling on the following healthy behaviors: medication adherence  2. Patient given educational materials - see patient instructions  3. Was a self-tracking handout given in paper form or via [employer]? No  If yes, see orders or list here. 4.  Discussed use, benefit, and side effects of prescribed medications. Barriers to medication compliance addressed. All patient questions answered. Pt voiced understanding. 5.  Reviewed prior labs, imaging, consultation, follow up, and health maintenance  6. Continue current medications, diet and exercise. 7. Discussed use, benefit, and side effects of prescribed medications. Barriers to medication compliance addressed. All her questions were answered. Pt voiced understanding. Deo Otero will continue current medications, diet and exercise. Of the  30  minute duration appointment visit, Krystina Meza CNP spent at least 50% of the face-to-face time in counseling, explanation of diagnosis, planning of further management, and answering all questions.           Signed:  Krystina Meza CNP

## 2022-10-03 ENCOUNTER — TELEPHONE (OUTPATIENT)
Dept: FAMILY MEDICINE CLINIC | Age: 47
End: 2022-10-03

## 2022-10-03 NOTE — TELEPHONE ENCOUNTER
----- Message from Yadi Henderson sent at 10/3/2022  8:33 AM EDT -----  Subject: Appointment Request    Reason for Call: Established Patient Appointment needed: Semi-Routine   Cough, Cold Symptoms    QUESTIONS    Reason for appointment request? No appointments available during search     Additional Information for Provider? patient is having congestion and   runny nose. no fever, he is unable to do a vv but can do a phone call   visit , please follow up to advise , he takes a cab so if you have   anything please follow up and let the patient know . he needs to arrange   for a ride. thanks  ---------------------------------------------------------------------------  --------------  [facility]  [phone];  Do not leave any message, patient will call back for answer  ---------------------------------------------------------------------------  --------------  SCRIPT ANSWERS  COVID Screen: Red

## 2022-10-12 ENCOUNTER — OFFICE VISIT (OUTPATIENT)
Dept: PODIATRY | Age: 47
End: 2022-10-12
Payer: COMMERCIAL

## 2022-10-12 VITALS — WEIGHT: 315 LBS | HEIGHT: 74 IN | BODY MASS INDEX: 40.43 KG/M2

## 2022-10-12 DIAGNOSIS — B35.1 DERMATOPHYTOSIS OF NAIL: Primary | ICD-10-CM

## 2022-10-12 DIAGNOSIS — R60.0 EDEMA OF LOWER EXTREMITY: ICD-10-CM

## 2022-10-12 DIAGNOSIS — L60.0 INGROWN NAIL: ICD-10-CM

## 2022-10-12 DIAGNOSIS — M20.41 HAMMER TOES OF BOTH FEET: ICD-10-CM

## 2022-10-12 DIAGNOSIS — M20.42 HAMMER TOES OF BOTH FEET: ICD-10-CM

## 2022-10-12 DIAGNOSIS — M79.605 BILATERAL LOWER EXTREMITY PAIN: ICD-10-CM

## 2022-10-12 DIAGNOSIS — M79.604 BILATERAL LOWER EXTREMITY PAIN: ICD-10-CM

## 2022-10-12 PROCEDURE — G8427 DOCREV CUR MEDS BY ELIG CLIN: HCPCS | Performed by: PODIATRIST

## 2022-10-12 PROCEDURE — 11721 DEBRIDE NAIL 6 OR MORE: CPT | Performed by: PODIATRIST

## 2022-10-12 PROCEDURE — 4004F PT TOBACCO SCREEN RCVD TLK: CPT | Performed by: PODIATRIST

## 2022-10-12 PROCEDURE — G8484 FLU IMMUNIZE NO ADMIN: HCPCS | Performed by: PODIATRIST

## 2022-10-12 PROCEDURE — G8417 CALC BMI ABV UP PARAM F/U: HCPCS | Performed by: PODIATRIST

## 2022-10-12 PROCEDURE — 99213 OFFICE O/P EST LOW 20 MIN: CPT | Performed by: PODIATRIST

## 2022-10-12 NOTE — PROGRESS NOTES
[address] [facility] PODIATRY [facility]  [address] [address] [place] [address] [address] [address]  Dept: [phone]  Dept Fax: [phone]     PAIN PROGRESS NOTE  Date of patient's visit: 10/12/2022  Patient's Name:  Isreal Bourgeois YOB: 1975            Patient Care Team:  AMY Jacobs CNP as PCP - General (Nurse Practitioner)  AMY Jacobs CNP as PCP - [facility]  Chauncey Lopez DPM as Physician (Podiatry)      Chief Complaint   Patient presents with    Foot Pain     Bilateral feet    Nail Problem     Toenail trim       Subjective: This Isreal Bourgeois comes to clinic for foot and nail care. Pt currently has complaint of thickened, painful, elongated nails that he/she cannot manage by themselves. Pt. Relates pain to nails with shoe gear. Pt's primary care physician is AMY Jacobs CNP last seen 09/21/2022. Pt has a new complaint of increased swelling to michael LE. Pt has tried changing shoes but it has not helped the pain    Past Medical History:   Diagnosis Date    Depression     Hypertriglyceridemia     Obesity        Allergies   Allergen Reactions    Codeine Other (See Comments)     hallucinates      Current Outpatient Medications on File Prior to Visit   Medication Sig Dispense Refill    vitamin C (ASCORBIC ACID) 500 MG tablet Take 2 tablets by mouth daily 30 tablet 5    Multiple Vitamins-Minerals (THERAPEUTIC MULTIVITAMIN-MINERALS) tablet Take 1 tablet by mouth daily 30 tablet 5    FLUoxetine (PROZAC) 10 MG capsule daily      ARIPiprazole (ABILIFY) 5 MG tablet Take 5 mg by mouth daily       No current facility-administered medications on file prior to visit. Review of Systems.     Review of Systems:   History obtained from chart review and the patient  General ROS: negative for - chills, fatigue, fever, night sweats or weight gain  Constitutional: Negative for chills, diaphoresis, fatigue, fever and unexpected weight change. Musculoskeletal: Positive for arthralgias, gait problem and joint swelling. Neurological ROS: negative for - behavioral changes, confusion, headaches or seizures. Negative for weakness and numbness. Dermatological ROS: negative for - mole changes, rash  Cardiovascular: Negative for leg swelling. Gastrointestinal: Negative for constipation, diarrhea, nausea and vomiting. Objective:  Dermatologic Exam:  Skin lesion/ulceration Absent . Skin No rashes or nodules noted. .   Skin is thin, with flaky sloughing skin as well as decreased hair growth to the lower leg  Small red hemosiderin deposits seen dorsal foot   Musculoskeletal:     1st MPJ ROM decreased, Bilateral.  Muscle strength 5/5, Bilateral.  Pain present upon palpation of toenails 1-5, Bilateral. decreased medial longitudinal arch, Bilateral.  Ankle ROM decreased,Bilateral.    Dorsally contracted digits present digits 2, Bilateral.     Vascular: DP pulses 1/4 bilateral.  PT pulses 0/4 bilateral.   CFT <5 seconds, Bilateral.  Hair growth absent to the level of the digits, Bilateral.  Edema present, Bilateral.  Varicosities absent, Bilateral. Erythema absent, Bilateral    Neurological: Sensation diminshed to light touch to level of digits, Bilateral.  Protective sensation intact 6/10 sites via 5.07/10g [place] Monofilament, Bilateral.  negative Tinel's, Bilateral.  negative Valleix sign, Bilateral.      Integument: Warm, dry, supple, Bilateral.  Open lesion absent, Bilateral.  Interdigital maceration absent to web spaces 4, Bilateral.  Nails 1-5 left and 1-5 right thickened > 3.0 mm, dystrophic and crumbly, discolored with subungual debris. Fissures absent, Bilateral.   General: AAO x 3 in NAD.     Derm  Toenail Description  Sites of Onychomycosis Involvement (Check affected area)  [x] [x] [x] [x] [x] [x] [x] [x] [x] [x]  5 4 3 2 1 1 2 3 4 5                          Right Left    Thickness  [x] [x] [x] [x] [x] [x] [x] [x] [x] [x]  5 4 3 2 1 1 2 3 4 5                         Right                                        Left    Dystrophic Changes   [x] [x] [x] [x] [x] [x] [x] [x] [x] [x]  5 4 3 2 1 1 2 3 4 5                         Right                                        Left    Color  [x] [x] [x] [x] [x] [x] [x] [x] [x] [x]  5 4 3 2 1 1 2 3 4 5                          Right                                        Left    Incurvation/Ingrowin   [] [] [] [] [] [] [] [] [] []  5 4 3 2 1 1 2 3 4 5                         Right                                        Left    Inflammation/Pain   [x] [x] [x] [x] [x] [x] [x] [x] [x] [x]  5 4 3  2 1 1 2 3 4 5                         Right                                        Left       Nails are painful 1-10 with direct palpation. Q7   []Yes  []No                Q8   [x]Yes  []No                     Q9   []Yes    []No  Assessment:  50 y.o. male with:    Diagnosis Orders   1. Dermatophytosis of nail  00310 - [place] DEBRIDEMENT OF NAILS, 6 OR MORE      2. Bilateral lower extremity pain  24415 - [place] DEBRIDEMENT OF NAILS, 6 OR MORE      3. Ingrown nail  79726 - [place] DEBRIDEMENT OF NAILS, 6 OR MORE      4. Edema of lower extremity  40525 - [place] DEBRIDEMENT OF NAILS, 6 OR MORE      5. Hammer toes of both feet  14111 - [place] DEBRIDEMENT OF NAILS, 6 OR MORE              Plan:   Pt was evaluated and examined. Patient was given personalized discharge instructions. To address new complaint of increased swelling, recommend patient to wear compression stocking and instructed pt to wear at all times when walking. Pt may take NSAIDs as needed. Nails 1-10 were debrided in length and thickness sharply with a nail nipper and  without incident. Pt will follow up in 9 weeks or sooner if any problems arise. Diagnosis was discussed with the pt and all of their questions were answered in detail. Proper foot hygiene and care was discussed with the pt. Patient to check feet daily and contact the office with any questions/problems/concerns. Other comorbidity noted and will be managed by PCP. Pain waiver discussed with patient and confirmed.    10/12/2022      Electronically signed by Quyen Solomon DPM on 10/12/2022 at 10:19 AM  10/12/2022

## 2022-10-18 ENCOUNTER — OFFICE VISIT (OUTPATIENT)
Dept: DERMATOLOGY | Age: 47
End: 2022-10-18
Payer: COMMERCIAL

## 2022-10-18 VITALS
TEMPERATURE: 97.2 F | BODY MASS INDEX: 40.43 KG/M2 | HEART RATE: 88 BPM | SYSTOLIC BLOOD PRESSURE: 112 MMHG | DIASTOLIC BLOOD PRESSURE: 71 MMHG | WEIGHT: 315 LBS | OXYGEN SATURATION: 98 % | HEIGHT: 74 IN

## 2022-10-18 DIAGNOSIS — L30.1 DYSHIDROTIC ECZEMA: Primary | ICD-10-CM

## 2022-10-18 DIAGNOSIS — R23.8 EXCORIATED PAPULE: ICD-10-CM

## 2022-10-18 PROCEDURE — G8417 CALC BMI ABV UP PARAM F/U: HCPCS | Performed by: PHYSICIAN ASSISTANT

## 2022-10-18 PROCEDURE — 99214 OFFICE O/P EST MOD 30 MIN: CPT | Performed by: PHYSICIAN ASSISTANT

## 2022-10-18 PROCEDURE — G8484 FLU IMMUNIZE NO ADMIN: HCPCS | Performed by: PHYSICIAN ASSISTANT

## 2022-10-18 PROCEDURE — 4004F PT TOBACCO SCREEN RCVD TLK: CPT | Performed by: PHYSICIAN ASSISTANT

## 2022-10-18 PROCEDURE — G8427 DOCREV CUR MEDS BY ELIG CLIN: HCPCS | Performed by: PHYSICIAN ASSISTANT

## 2022-10-18 RX ORDER — AMMONIUM LACTATE 12 G/100G
CREAM TOPICAL
Qty: 140 G | Refills: 2 | Status: SHIPPED | OUTPATIENT
Start: 2022-10-18 | End: 2022-11-17

## 2022-10-18 RX ORDER — CLOBETASOL PROPIONATE 0.5 MG/G
OINTMENT TOPICAL
Qty: 60 G | Refills: 2 | Status: SHIPPED | OUTPATIENT
Start: 2022-10-18

## 2022-10-18 NOTE — PROGRESS NOTES
Dermatology Patient Note  [address]  [facility]  Dept: [phone]  Dept Fax: [phone]      VISITDATE: 10/18/2022   REFERRING PROVIDER: No ref. provider found      Denice Lundy is a 50 y.o. male  who presents today in the office for:    Follow-up (Pt states he has dry and white patches on his hands since the weather has gotten cooler ) and Medication Refill (Topicort 0.25% Kenalog 0.1%  Bactroban 2% )      HISTORY OF PRESENT ILLNESS:  As above. Admits to frequent hand washing. Also concerned that he may have bed bugs, after he noticed two lesions on his left upper arm. He was told by his PCP that they were bed bug bites. He states that he began using a plug in bug repellant, near his bed, and has not received any new lesions over the past two weeks. MEDICAL PROBLEMS:  Patient Active Problem List    Diagnosis Date Noted    IFG (impaired fasting glucose) 01/19/2022    Smoker unmotivated to quit 12/21/2020    Schizophrenia ([facility] 08/24/2018    Homicidal ideation 08/22/2018    Morbid obesity due to excess calories ([facility] 03/27/2017       CURRENT MEDICATIONS:   Current Outpatient Medications   Medication Sig Dispense Refill    clobetasol (TEMOVATE) 0.05 % ointment Apply to hands 2 times daily, as needed, for itching. 60 g 2    ammonium lactate (LAC-HYDRIN) 12 % cream Apply to hands 1-2 times daily, as needed, for scaling. 140 g 2    vitamin C (ASCORBIC ACID) 500 MG tablet Take 2 tablets by mouth daily 30 tablet 5    Multiple Vitamins-Minerals (THERAPEUTIC MULTIVITAMIN-MINERALS) tablet Take 1 tablet by mouth daily 30 tablet 5    FLUoxetine (PROZAC) 10 MG capsule daily      ARIPiprazole (ABILIFY) 5 MG tablet Take 5 mg by mouth daily       No current facility-administered medications for this visit.        ALLERGIES:   Allergies   Allergen Reactions    Codeine Other (See Comments)     hallucinates        SOCIAL HISTORY:  Social History     Tobacco Use    Smoking status: Every Day     Packs/day: 1.00     Years: 29.00     Pack years: 29.00     Types: Cigarettes    Smokeless tobacco: Never   Substance Use Topics    Alcohol use: No       Pertinent ROS:  Review of Systems  Skin: Denies any new changing, growing or bleeding lesions or rashes except as described in the HPI   Constitutional: Denies fevers, chills, and malaise. PHYSICAL EXAM:   /71   Pulse 88   Temp 97.2 °F (36.2 °C) (Temporal)   Ht 6' 2\" (1.88 m)   Wt (!) 341 lb (154.7 kg)   SpO2 98%   BMI 43.78 kg/m²     The patient is generally well appearing, well nourished, alert and conversational. Affect is normal.    Cutaneous Exam:  Physical Exam  Sun-exposed skin: head/face, neck, both arms, digits and nails were examined. Right upper arm crusted papules x 2    Facial covering was removed during examination. Diagnoses/exam findings/medical history pertinent to this visit are listed below:    Assessment:   Diagnosis Orders   1. Dyshidrotic eczema  clobetasol (TEMOVATE) 0.05 % ointment    ammonium lactate (LAC-HYDRIN) 12 % cream      2. Excoriated papule             Plan:  1. Dyshidrotic eczema  - chronic illness with progression and/or exacerbation   - Reduce hand washing  - clobetasol (TEMOVATE) 0.05 % ointment; Apply to hands 2 times daily, as needed, for itching. Dispense: 60 g; Refill: 2  - ammonium lactate (LAC-HYDRIN) 12 % cream; Apply to hands 1-2 times daily, as needed, for scaling. Dispense: 140 g; Refill: 2    2. Excoriated papule  - It is impossible to discern the native morphology of these now heavily excoriated lesions. I advised pt as to the signs of a bed bug infestation and how to find them.       RTC 3M    Future Appointments   Date Time Provider Sharon Eda   11/28/2022  2:00 PM AMY Norton - [facility]   12/14/2022  3:15 PM MAVERICK [facility]   1/19/2023  3:15 PM Jenifer Thomas PA-C  derm [facility]         There are no Patient Instructions on file for this visit.       Electronically signed by Kayla Cano PA-C on 10/18/22 at 5:07 PM EDT

## 2022-10-24 ENCOUNTER — TELEPHONE (OUTPATIENT)
Dept: FAMILY MEDICINE CLINIC | Age: 47
End: 2022-10-24

## 2022-10-24 NOTE — TELEPHONE ENCOUNTER
Roxy Broderick from [place] [address] and stated that she had done a yearly assessment on pt. And at this time pt is doing great. Roxy Broderick was checking with the provider to see if there are any concerns at this time.  Please Advise Thank you

## 2022-10-25 DIAGNOSIS — Z78.9 TAKES DAILY MULTIVITAMINS: ICD-10-CM

## 2022-10-25 RX ORDER — MULTIVIT,CALC,MINS/IRON/FOLIC 9MG-400MCG
TABLET ORAL
Qty: 30 TABLET | Refills: 4 | OUTPATIENT
Start: 2022-10-25

## 2022-11-09 ENCOUNTER — TELEPHONE (OUTPATIENT)
Dept: FAMILY MEDICINE CLINIC | Age: 47
End: 2022-11-09

## 2022-11-09 DIAGNOSIS — Z78.9 TAKES DAILY MULTIVITAMINS: ICD-10-CM

## 2022-11-09 RX ORDER — M-VIT,TX,IRON,MINS/CALC/FOLIC 27MG-0.4MG
1 TABLET ORAL DAILY
Qty: 30 TABLET | Refills: 5 | Status: SHIPPED | OUTPATIENT
Start: 2022-11-09

## 2022-11-09 NOTE — TELEPHONE ENCOUNTER
Patient called wanting you to send in new script for a multivitamins that [employer] will cover.     [address] in [place]    Thank you,  Verna Morgan

## 2022-11-10 ENCOUNTER — TELEPHONE (OUTPATIENT)
Dept: FAMILY MEDICINE CLINIC | Age: 47
End: 2022-11-10

## 2022-11-10 NOTE — TELEPHONE ENCOUNTER
Patient called returning a missed call for the office. I did inform pt that his vitamins were ready for  at his pharmacy.

## 2022-11-28 ENCOUNTER — OFFICE VISIT (OUTPATIENT)
Dept: FAMILY MEDICINE CLINIC | Age: 47
End: 2022-11-28
Payer: COMMERCIAL

## 2022-11-28 VITALS
HEIGHT: 74 IN | OXYGEN SATURATION: 95 % | BODY MASS INDEX: 40.43 KG/M2 | HEART RATE: 89 BPM | SYSTOLIC BLOOD PRESSURE: 134 MMHG | DIASTOLIC BLOOD PRESSURE: 92 MMHG | WEIGHT: 315 LBS

## 2022-11-28 DIAGNOSIS — F20.9 SCHIZOPHRENIA, UNSPECIFIED TYPE (HCC): ICD-10-CM

## 2022-11-28 DIAGNOSIS — R73.01 IFG (IMPAIRED FASTING GLUCOSE): Primary | ICD-10-CM

## 2022-11-28 DIAGNOSIS — Z78.9 TAKES DAILY MULTIVITAMINS: ICD-10-CM

## 2022-11-28 DIAGNOSIS — F17.200 SMOKER UNMOTIVATED TO QUIT: ICD-10-CM

## 2022-11-28 DIAGNOSIS — E66.01 MORBID OBESITY DUE TO EXCESS CALORIES (HCC): ICD-10-CM

## 2022-11-28 PROCEDURE — 99214 OFFICE O/P EST MOD 30 MIN: CPT | Performed by: NURSE PRACTITIONER

## 2022-11-28 PROCEDURE — G8427 DOCREV CUR MEDS BY ELIG CLIN: HCPCS | Performed by: NURSE PRACTITIONER

## 2022-11-28 PROCEDURE — G8417 CALC BMI ABV UP PARAM F/U: HCPCS | Performed by: NURSE PRACTITIONER

## 2022-11-28 PROCEDURE — 4004F PT TOBACCO SCREEN RCVD TLK: CPT | Performed by: NURSE PRACTITIONER

## 2022-11-28 PROCEDURE — G8484 FLU IMMUNIZE NO ADMIN: HCPCS | Performed by: NURSE PRACTITIONER

## 2022-11-28 RX ORDER — ASCORBIC ACID 500 MG
TABLET ORAL
Qty: 60 TABLET | Refills: 4 | Status: SHIPPED | OUTPATIENT
Start: 2022-11-28

## 2022-11-28 RX ORDER — ALBUTEROL SULFATE 90 UG/1
AEROSOL, METERED RESPIRATORY (INHALATION)
COMMUNITY
Start: 2022-11-04

## 2022-11-28 NOTE — PROGRESS NOTES
[facility]  [address] [facility], [facility]  [phone]  [phone]    Frederic Agustin is a 50 y.o. male who is here with c/o of:    Chief Complaint: Obesity (Follow Up ), Cough (Bronchitis x1 month. ), and Equipment Request (Request Motorized Scooter )      Patient Accompanied by: N/A    HPI - Frederic Agustin is here today with c/o:    Patient here for re evaluation of chronic conditions. Obesity-  He says his diet is poor. Lives in a group home and says he is not served healthy food. No routine exercise. He does walk often. Schizophrenia-  Follows with [facility] and compliant with medications. Says his medications are working well. Denies SI or HI. Smoker-  Continues to smoke with no motivation to quit. Smokes 1 pk per day. Health Concerns-  Says that he would like a scooter for long distance walking. Says that his feet ache after he is on them for a short time. He went for evaluation at PT and did not qualify. Says that his  says he would qualify if PCP wrote him a letter saying he needs it.        Health Maintenance Due   Topic Date Due    Colorectal Cancer Screen  Never done    COVID-19 Vaccine (4 - Booster for Pfizer series) 02/13/2022    Flu vaccine (1) 08/01/2022        Patient Active Problem List:     Morbid obesity due to excess calories ([address]     Homicidal ideation     Schizophrenia ([facility]     Smoker unmotivated to quit     IFG (impaired fasting glucose)     Past Medical History:   Diagnosis Date    Depression     Hypertriglyceridemia     Obesity       Past Surgical History:   Procedure Laterality Date    TONSILLECTOMY       Family History   Problem Relation Age of Onset    Cancer Mother         breast    Other Father         COPD    Cancer Father         prostate    Diabetes Sister      Social History     Tobacco Use    Smoking status: Every Day     Packs/day: 1.00     Years: 29.00     Pack years: 29.00     Types: Cigarettes Smokeless tobacco: Never   Substance Use Topics    Alcohol use: No     ALLERGIES:    Allergies   Allergen Reactions    Codeine Other (See Comments)     hallucinates           Subjective   Review of Systems   Constitutional:  Negative for activity change, appetite change,unexpected weight change, chills, fever, and fatigue. HENT: Negative for ear pain, sore throat,  Rhinorrhea, sinus pain, sinus pressure, congestion. Eyes:  Negative for pain and discharge. Respiratory:  Negative for chest tightness, shortness of breath, wheezing, and cough. Cardiovascular:  Negative for chest pain, palpitations and leg swelling. Gastrointestinal: Negative for abdominal pain, blood in stool, constipation,diarrhea, nausea and vomiting. Endocrine: Negative for cold intolerance, heat intolerance, polydipsia, polyphagia and polyuria. Genitourinary: Negative for difficulty urinating, dysuria, flank pain, frequency, hematuria and urgency. Musculoskeletal: Negative for arthralgias, back pain, joint swelling, myalgias, neck pain and neck stiffness. Skin: Negative for rash and wound. Allergic/Immunologic: Negative for environmental allergies and food allergies. Neurological:  Negative for dizziness, light-headedness, numbness and headaches. Hematological:  Negative for adenopathy. Does not bruise/bleed easily. Psychiatric/Behavioral: Negative for self-injury, sleep disturbance and suicidal ideas. Objective   Physical Exam   PHYSICAL EXAM:   Constitutional: Anay Armas is oriented to person, place, and time. Vital signs are normal. Appears well-developed and well-nourished. He is obese  Head: Normocephalic and atraumatic. Eyes:PERRL, EOMI, Conjunctiva normal, No discharge. Neck: Full passive range of motion. Non-tender on palpation. Neck supple. No thyromegaly present. Trachea normal.  Cardiovascular: Normal rate, regular rhythm, S1, S2, no murmur, no gallop, no friction rub, intact distal pulses. Pulmonary/Chest: Breath sounds are clear throughout, No respiratory distress, No wheezing, No chest tenderness. Effort normal  Abdominal: Soft. Normal appearance, bowel sounds are present and normoactive. There is no hepatosplenomegaly. There is no tenderness. There is no CVA tenderness. Musculoskeletal: Extremities appear regular and symmetric. No evident masses, lesions, foreign bodies, or other abnormalities. No edema. No tenderness on palpation. Joints are stable. Full ROM, strength and tone are within normal limits. Neurological: Alert and oriented to person, place, and time. Normal motor function, Normal sensory function, No focal deficits noted. He has normal strength. Skin: Skin is warm, dry and intact. No obvious lesions on exposed skin  Psychiatric: Normal mood and affect. Speech is normal and behavior is normal.     Nursing note and vitals reviewed. Blood pressure (!) 134/92, pulse 89, height 6' 2\" (1.88 m), weight (!) 348 lb 3.2 oz (157.9 kg), SpO2 95 %. Body mass index is 44.71 kg/m². Wt Readings from Last 3 Encounters:   11/28/22 (!) 348 lb 3.2 oz (157.9 kg)   10/18/22 (!) 341 lb (154.7 kg)   10/12/22 (!) 340 lb (154.2 kg)     BP Readings from Last 3 Encounters:   11/28/22 (!) 134/92   10/18/22 112/71   09/21/22 [address] Outpatient Visit on 07/18/2022   Component Date Value Ref Range Status    Specimen Description 07/18/2022 . BACK SWAB   Final    Direct Exam 07/18/2022 NO NEUTROPHILS SEEN   Final    Direct Exam 07/18/2022 NO BACTERIA SEEN   Final    Culture 07/18/2022 NO GROWTH   Final     No results found for this visit on 11/28/22. Completed Orders/Prescriptions   No orders of the defined types were placed in this encounter. AssessmentPlan/Medical Decision Making     1. IFG (impaired fasting glucose)    - CBC with Auto Differential; Future  - Comprehensive Metabolic Panel; Future  - Lipid, Fasting; Future  - Hemoglobin A1C; Future    2.  Smoker unmotivated to quit    - Encouraged to quit  - CBC with Auto Differential; Future  - Comprehensive Metabolic Panel; Future  - Lipid, Fasting; Future    3. Schizophrenia, unspecified type ([facility]    - Follows with Psych  - CBC with Auto Differential; Future  - Comprehensive Metabolic Panel; Future    4. Morbid obesity due to excess calories ([facility])    - Weight loss recommended  - CBC with Auto Differential; Future  - Comprehensive Metabolic Panel; Future  - Lipid, Fasting; Future    Return in about 6 months (around 5/28/2023) for chronic conditions. 1.  Jonny received counseling on the following healthy behaviors: nutrition, exercise, and medication adherence  2. Patient given educational materials - see patient instructions  3. Was a self-tracking handout given in paper form or via [employer]? No  If yes, see orders or list here. 4.  Discussed use, benefit, and side effects of prescribed medications. Barriers to medication compliance addressed. All patient questions answered. Pt voiced understanding. 5.  Reviewed prior labs, imaging, consultation, follow up, and health maintenance  6. Continue current medications, diet and exercise. 7. Discussed use, benefit, and side effects of prescribed medications. Barriers to medication compliance addressed. All her questions were answered. Pt voiced understanding. Sonia Choudhary will continue current medications, diet and exercise. Of the  30  minute duration appointment visit, El Tracey CNP spent at least 50% of the face-to-face time in counseling, explanation of diagnosis, planning of further management, and answering all questions.           Signed:  El Tracey CNP

## 2022-11-28 NOTE — TELEPHONE ENCOUNTER
Thor Ruvalcaba is calling to request a refill on the following medication(s):    Last Visit Date (If Applicable):  1/41/3505    Next Visit Date:    11/28/2022    Medication Request:  Requested Prescriptions     Pending Prescriptions Disp Refills    vitamin C (ASCORBIC ACID) 500 MG tablet [Pharmacy Med Name: VITAMIN C 500 MG TABLET] 60 tablet 4     Sig: TAKE 2 TABS BY MOUTH ONCE EVERY DAY

## 2023-01-05 ENCOUNTER — TELEPHONE (OUTPATIENT)
Dept: DERMATOLOGY | Age: 48
End: 2023-01-05

## 2023-01-05 NOTE — TELEPHONE ENCOUNTER
Pt has hand eczema and was wanting to know if you can send him a topical that is covered by his insurance

## 2023-01-12 NOTE — TELEPHONE ENCOUNTER
Pt said he doesn't know if med is covered or not because he never picked it up and pt said he already had appointment for the 19th and just wanted to keep the scheduled appoint

## 2023-03-01 ENCOUNTER — OFFICE VISIT (OUTPATIENT)
Dept: PODIATRY | Age: 48
End: 2023-03-01
Payer: COMMERCIAL

## 2023-03-01 VITALS — BODY MASS INDEX: 40.43 KG/M2 | HEIGHT: 74 IN | WEIGHT: 315 LBS

## 2023-03-01 DIAGNOSIS — M79.605 BILATERAL LOWER EXTREMITY PAIN: ICD-10-CM

## 2023-03-01 DIAGNOSIS — L60.0 INGROWN NAIL: ICD-10-CM

## 2023-03-01 DIAGNOSIS — B35.1 DERMATOPHYTOSIS OF NAIL: Primary | ICD-10-CM

## 2023-03-01 DIAGNOSIS — R60.0 EDEMA OF LOWER EXTREMITY: ICD-10-CM

## 2023-03-01 DIAGNOSIS — M79.604 BILATERAL LOWER EXTREMITY PAIN: ICD-10-CM

## 2023-03-01 PROCEDURE — G8484 FLU IMMUNIZE NO ADMIN: HCPCS | Performed by: PODIATRIST

## 2023-03-01 PROCEDURE — G8427 DOCREV CUR MEDS BY ELIG CLIN: HCPCS | Performed by: PODIATRIST

## 2023-03-01 PROCEDURE — G8417 CALC BMI ABV UP PARAM F/U: HCPCS | Performed by: PODIATRIST

## 2023-03-01 PROCEDURE — 11721 DEBRIDE NAIL 6 OR MORE: CPT | Performed by: PODIATRIST

## 2023-03-01 PROCEDURE — 4004F PT TOBACCO SCREEN RCVD TLK: CPT | Performed by: PODIATRIST

## 2023-03-01 PROCEDURE — 99213 OFFICE O/P EST LOW 20 MIN: CPT | Performed by: PODIATRIST

## 2023-03-01 RX ORDER — MULTIVITAMIN WITH FOLIC ACID 400 MCG
TABLET ORAL
COMMUNITY
Start: 2023-02-24

## 2023-03-01 RX ORDER — AZITHROMYCIN 250 MG/1
TABLET, FILM COATED ORAL
COMMUNITY
Start: 2023-02-27

## 2023-03-01 RX ORDER — SULFAMETHOXAZOLE AND TRIMETHOPRIM 800; 160 MG/1; MG/1
TABLET ORAL
COMMUNITY
Start: 2022-12-04

## 2023-03-01 NOTE — PROGRESS NOTES
[address] PODIATRY [facility]  [address]  [address]  Dept: [phone]  Dept Fax: [phone]     PAIN PROGRESS NOTE  Date of patient's visit: 3/1/2023  Patient's Name:  Jack Conklin YOB: 1975            Patient Care Team:  AMY Hodge CNP as PCP - General (Nurse Practitioner)  AMY Hodge CNP as PCP - Empaneled Provider  Jesus Willis DPM as Physician (Podiatry)      Chief Complaint   Patient presents with    Nail Problem     Toenail trim    Foot Pain     Bilateral feet       Subjective: This Jack Conklin comes to clinic for foot and nail care. Pt currently has complaint of thickened, painful, elongated nails that he/she cannot manage by themselves. Pt. Relates pain to nails with shoe gear. Pt's primary care physician is AMY Hodge CNP last seen 11/28/2022. Pt has a new complaint of increased swelling to michael LE. Pt has tried changing shoes but it has not helped the pain    Past Medical History:   Diagnosis Date    Depression     Hypertriglyceridemia     Obesity        Allergies   Allergen Reactions    Codeine Other (See Comments)     hallucinates      Current Outpatient Medications on File Prior to Visit   Medication Sig Dispense Refill    azithromycin (ZITHROMAX) 250 MG tablet       Multiple Vitamin (MULTIVITAMIN) tablet       nicotine (NICODERM CQ) 7 MG/24HR       sulfamethoxazole-trimethoprim (BACTRIM DS;SEPTRA DS) 800-160 MG per tablet       albuterol sulfate HFA (PROVENTIL;VENTOLIN;PROAIR) 108 (90 Base) MCG/ACT inhaler       vitamin C (ASCORBIC ACID) 500 MG tablet TAKE 2 TABS BY MOUTH ONCE EVERY DAY 60 tablet 4    Multiple Vitamins-Minerals (THERAPEUTIC MULTIVITAMIN-MINERALS) tablet Take 1 tablet by mouth daily 30 tablet 5    clobetasol (TEMOVATE) 0.05 % ointment Apply to hands 2 times daily, as needed, for itching.  60 g 2    FLUoxetine (PROZAC) 10 MG capsule daily Prior authorization approval received from: [employer]    Valid: 2/2/2020-3/3/2023    Pharmacy notified via updated prescription.    Will forward to THANIA Ramos for approval of prescription.    Anju Barrios LPN  Diabetes Clinic Coordinator   Adult Endocrinology [facility] Pediatric Speciality Clinics  [facility]           ARIPiprazole (ABILIFY) 5 MG tablet Take 5 mg by mouth daily       No current facility-administered medications on file prior to visit. Review of Systems. Review of Systems:   History obtained from chart review and the patient  General ROS: negative for - chills, fatigue, fever, night sweats or weight gain  Constitutional: Negative for chills, diaphoresis, fatigue, fever and unexpected weight change. Musculoskeletal: Positive for arthralgias, gait problem and joint swelling. Neurological ROS: negative for - behavioral changes, confusion, headaches or seizures. Negative for weakness and numbness. Dermatological ROS: negative for - mole changes, rash  Cardiovascular: Negative for leg swelling. Gastrointestinal: Negative for constipation, diarrhea, nausea and vomiting. Objective:  Dermatologic Exam:  Skin lesion/ulceration Absent . Skin No rashes or nodules noted. .   Skin is thin, with flaky sloughing skin as well as decreased hair growth to the lower leg  Small red hemosiderin deposits seen dorsal foot   Musculoskeletal:     1st MPJ ROM decreased, Bilateral.  Muscle strength 5/5, Bilateral.  Pain present upon palpation of toenails 1-5, Bilateral. decreased medial longitudinal arch, Bilateral.  Ankle ROM decreased,Bilateral.    Dorsally contracted digits present digits 2, Bilateral.     Vascular: DP pulses 1/4 bilateral.  PT pulses 0/4 bilateral.   CFT <5 seconds, Bilateral.  Hair growth absent to the level of the digits, Bilateral.  Edema present, Bilateral.  Varicosities absent, Bilateral. Erythema absent, Bilateral    Neurological: Sensation diminshed to light touch to level of digits, Bilateral.  Protective sensation intact 6/10 sites via 5.07/10g [place] Monofilament, Bilateral.  negative Tinel's, Bilateral.  negative Valleix sign, Bilateral.      Integument: Warm, dry, supple, Bilateral.  Open lesion absent, Bilateral.  Interdigital maceration absent to web spaces 4, Bilateral. Nails 1-5 left and 1-5 right thickened > 3.0 mm, dystrophic and crumbly, discolored with subungual debris. Fissures absent, Bilateral.   General: AAO x 3 in NAD. Derm  Toenail Description  Sites of Onychomycosis Involvement (Check affected area)  [x] [x] [x] [x] [x] [x] [x] [x] [x] [x]  5 4 3 2 1 1 2 3 4 5                          Right                                        Left    Thickness  [x] [x] [x] [x] [x] [x] [x] [x] [x] [x]  5 4 3 2 1 1 2 3 4 5                         Right                                        Left    Dystrophic Changes   [x] [x] [x] [x] [x] [x] [x] [x] [x] [x]  5 4 3 2 1 1 2 3 4 5                         Right                                        Left    Color  [x] [x] [x] [x] [x] [x] [x] [x] [x] [x]  5 4 3 2 1 1 2 3 4 5                          Right                                        Left    Incurvation/Ingrowin   [] [] [] [] [] [] [] [] [] []  5 4 3 2 1 1 2 3 4 5                         Right                                        Left    Inflammation/Pain   [x] [x] [x] [x] [x] [x] [x] [x] [x] [x]  5 4 3  2 1 1 2 3 4 5                         Right                                        Left       Nails are painful 1-10 with direct palpation. Q7   []Yes  []No                Q8   [x]Yes  []No                     Q9   []Yes    []No  Assessment:  50 y.o. male with:    Diagnosis Orders   1. Dermatophytosis of nail  W[place] DEBRIDEMENT NAIL ANY METHOD 6/>      2. Bilateral lower extremity pain  [place] DEBRIDEMENT NAIL ANY METHOD 6/>      3. Ingrown nail  [place] DEBRIDEMENT NAIL ANY METHOD 6/>      4. Edema of lower extremity  [place] DEBRIDEMENT NAIL ANY METHOD 6/>          Plan:   Pt was evaluated and examined. Patient was given personalized discharge instructions. To address new complaint of increased swelling, recommend patient to wear compression stockings and instructed pt to wear at all times when walking. Pt may take NSAIDs as needed.      Nails 1-10 were debrided in length and thickness sharply with a nail nipper and  without incident. Pt will follow up in 9 weeks or sooner if any problems arise. Diagnosis was discussed with the pt and all of their questions were answered in detail. Proper foot hygiene and care was discussed with the pt. Patient to check feet daily and contact the office with any questions/problems/concerns. Other comorbidity noted and will be managed by PCP. Pain waiver discussed with patient and confirmed.    3/1/2023      Electronically signed by Ana Crowley DPM on 3/1/2023 at 1:43 PM  3/1/2023

## 2023-04-25 DIAGNOSIS — Z78.9 TAKES DAILY MULTIVITAMINS: ICD-10-CM

## 2023-04-25 RX ORDER — ASCORBIC ACID 500 MG
TABLET ORAL
Qty: 60 TABLET | Refills: 5 | Status: SHIPPED | OUTPATIENT
Start: 2023-04-25

## 2023-04-25 RX ORDER — MULTIVITAMIN WITH FOLIC ACID 400 MCG
TABLET ORAL
Qty: 30 TABLET | Refills: 5 | Status: SHIPPED | OUTPATIENT
Start: 2023-04-25

## 2023-04-25 NOTE — TELEPHONE ENCOUNTER
Speedy Luis is calling to request a refill on the following medication(s):    Medication Request:  Requested Prescriptions     Pending Prescriptions Disp Refills    Multiple Vitamin (MULTIVITAMIN) tablet [Pharmacy Med Name: TAB-A-INGE TABLET] 30 tablet 5     Sig: TAKE 1 TAB BY MOUTH ONCE EVERY DAY    vitamin C (ASCORBIC ACID) 500 MG tablet [Pharmacy Med Name: VITAMIN C 500 MG TABLET] 60 tablet 5     Sig: TAKE 2 TABS BY MOUTH ONCE EVERY DAY       Last Visit Date (If Applicable):  79/26/3308    Next Visit Date:    6/1/2023

## 2023-05-08 ENCOUNTER — OFFICE VISIT (OUTPATIENT)
Dept: PODIATRY | Age: 48
End: 2023-05-08
Payer: COMMERCIAL

## 2023-05-08 VITALS — BODY MASS INDEX: 40.43 KG/M2 | WEIGHT: 315 LBS | HEIGHT: 74 IN

## 2023-05-08 DIAGNOSIS — B35.1 DERMATOPHYTOSIS OF NAIL: Primary | ICD-10-CM

## 2023-05-08 DIAGNOSIS — L60.0 INGROWN NAIL: ICD-10-CM

## 2023-05-08 DIAGNOSIS — M79.604 BILATERAL LOWER EXTREMITY PAIN: ICD-10-CM

## 2023-05-08 DIAGNOSIS — M79.605 BILATERAL LOWER EXTREMITY PAIN: ICD-10-CM

## 2023-05-08 DIAGNOSIS — R60.0 EDEMA OF LOWER EXTREMITY: ICD-10-CM

## 2023-05-08 PROCEDURE — G8427 DOCREV CUR MEDS BY ELIG CLIN: HCPCS | Performed by: PODIATRIST

## 2023-05-08 PROCEDURE — 4004F PT TOBACCO SCREEN RCVD TLK: CPT | Performed by: PODIATRIST

## 2023-05-08 PROCEDURE — 99213 OFFICE O/P EST LOW 20 MIN: CPT | Performed by: PODIATRIST

## 2023-05-08 PROCEDURE — 11721 DEBRIDE NAIL 6 OR MORE: CPT | Performed by: PODIATRIST

## 2023-05-08 PROCEDURE — G8417 CALC BMI ABV UP PARAM F/U: HCPCS | Performed by: PODIATRIST

## 2023-06-01 ENCOUNTER — OFFICE VISIT (OUTPATIENT)
Dept: FAMILY MEDICINE CLINIC | Age: 48
End: 2023-06-01
Payer: COMMERCIAL

## 2023-06-01 VITALS
WEIGHT: 315 LBS | OXYGEN SATURATION: 98 % | TEMPERATURE: 97.3 F | BODY MASS INDEX: 46.48 KG/M2 | SYSTOLIC BLOOD PRESSURE: 108 MMHG | DIASTOLIC BLOOD PRESSURE: 60 MMHG | HEART RATE: 85 BPM

## 2023-06-01 DIAGNOSIS — F17.200 SMOKER UNMOTIVATED TO QUIT: ICD-10-CM

## 2023-06-01 DIAGNOSIS — F20.9 SCHIZOPHRENIA, UNSPECIFIED TYPE (HCC): ICD-10-CM

## 2023-06-01 DIAGNOSIS — R73.01 IFG (IMPAIRED FASTING GLUCOSE): ICD-10-CM

## 2023-06-01 DIAGNOSIS — E66.01 MORBID OBESITY DUE TO EXCESS CALORIES (HCC): Primary | ICD-10-CM

## 2023-06-01 PROCEDURE — G8417 CALC BMI ABV UP PARAM F/U: HCPCS | Performed by: NURSE PRACTITIONER

## 2023-06-01 PROCEDURE — G8427 DOCREV CUR MEDS BY ELIG CLIN: HCPCS | Performed by: NURSE PRACTITIONER

## 2023-06-01 PROCEDURE — 99214 OFFICE O/P EST MOD 30 MIN: CPT | Performed by: NURSE PRACTITIONER

## 2023-06-01 PROCEDURE — 4004F PT TOBACCO SCREEN RCVD TLK: CPT | Performed by: NURSE PRACTITIONER

## 2023-06-01 SDOH — ECONOMIC STABILITY: FOOD INSECURITY: WITHIN THE PAST 12 MONTHS, YOU WORRIED THAT YOUR FOOD WOULD RUN OUT BEFORE YOU GOT MONEY TO BUY MORE.: NEVER TRUE

## 2023-06-01 SDOH — ECONOMIC STABILITY: INCOME INSECURITY: HOW HARD IS IT FOR YOU TO PAY FOR THE VERY BASICS LIKE FOOD, HOUSING, MEDICAL CARE, AND HEATING?: NOT HARD AT ALL

## 2023-06-01 SDOH — ECONOMIC STABILITY: HOUSING INSECURITY
IN THE LAST 12 MONTHS, WAS THERE A TIME WHEN YOU DID NOT HAVE A STEADY PLACE TO SLEEP OR SLEPT IN A SHELTER (INCLUDING NOW)?: NO

## 2023-06-01 SDOH — ECONOMIC STABILITY: FOOD INSECURITY: WITHIN THE PAST 12 MONTHS, THE FOOD YOU BOUGHT JUST DIDN'T LAST AND YOU DIDN'T HAVE MONEY TO GET MORE.: NEVER TRUE

## 2023-06-01 ASSESSMENT — PATIENT HEALTH QUESTIONNAIRE - PHQ9
SUM OF ALL RESPONSES TO PHQ9 QUESTIONS 1 & 2: 0
SUM OF ALL RESPONSES TO PHQ QUESTIONS 1-9: 0
2. FEELING DOWN, DEPRESSED OR HOPELESS: 0
1. LITTLE INTEREST OR PLEASURE IN DOING THINGS: 0
SUM OF ALL RESPONSES TO PHQ QUESTIONS 1-9: 0

## 2023-06-01 NOTE — PROGRESS NOTES
[address], [address]  [id number] [facility] [address]  [phone]  [phone]    Rayne Boas is a 50 y.o. male who is here with c/o of:    Chief Complaint: Medication Refill (Pt states he has a few questions to Ask today )      Patient Accompanied by: n/a    HPI - Rayne Boas is here today with c/o:    Patient here for re evaluation of chronic conditions. Obesity-  He says his diet is poor. Lives in a group home and says he is not served healthy food. No routine exercise. He does walk often. Schizophrenia-  Follows with [facility] and compliant with medications. Says his medications are working well. Denies SI or HI. Reports Lexapro was discontinued due to weight gain and not helping. Smoker-  Continues to smoke with no motivation to quit. Smokes 1 pk per day.        Health Maintenance Due   Topic Date Due    Colorectal Cancer Screen  Never done    COVID-19 Vaccine (4 - Booster for Pfizer series) 02/13/2022    A1C test (Diabetic or Prediabetic)  02/23/2023        Patient Active Problem List:     Morbid obesity due to excess calories ([address]     Homicidal ideation     Schizophrenia ([facility] [address]     Smoker unmotivated to quit     IFG (impaired fasting glucose)     Past Medical History:   Diagnosis Date    Depression     Hypertriglyceridemia     Obesity       Past Surgical History:   Procedure Laterality Date    TONSILLECTOMY       Family History   Problem Relation Age of Onset    Cancer Mother         breast    Other Father         COPD    Cancer Father         prostate    Diabetes Sister      Social History     Tobacco Use    Smoking status: Every Day     Packs/day: 1.00     Years: 29.00     Pack years: 29.00     Types: Cigarettes    Smokeless tobacco: Never   Substance Use Topics    Alcohol use: No     ALLERGIES:    Allergies   Allergen Reactions    Codeine Other (See Comments)     hallucinates           Subjective   Review of Systems   Constitutional:  Negative for

## 2023-06-21 ENCOUNTER — OFFICE VISIT (OUTPATIENT)
Dept: FAMILY MEDICINE CLINIC | Age: 48
End: 2023-06-21
Payer: COMMERCIAL

## 2023-06-21 ENCOUNTER — HOSPITAL ENCOUNTER (OUTPATIENT)
Age: 48
Setting detail: SPECIMEN
Discharge: HOME | End: 2023-06-21

## 2023-06-21 VITALS
OXYGEN SATURATION: 91 % | SYSTOLIC BLOOD PRESSURE: 110 MMHG | BODY MASS INDEX: 44.94 KG/M2 | HEART RATE: 95 BPM | DIASTOLIC BLOOD PRESSURE: 74 MMHG | WEIGHT: 315 LBS | TEMPERATURE: 97.2 F

## 2023-06-21 DIAGNOSIS — Z12.11 SCREENING FOR COLON CANCER: ICD-10-CM

## 2023-06-21 DIAGNOSIS — N39.0 FREQUENT UTI: ICD-10-CM

## 2023-06-21 DIAGNOSIS — Z09 HOSPITAL DISCHARGE FOLLOW-UP: Primary | ICD-10-CM

## 2023-06-21 DIAGNOSIS — N30.00 ACUTE CYSTITIS WITHOUT HEMATURIA: ICD-10-CM

## 2023-06-21 LAB
BILIRUB UR QL STRIP: NEGATIVE
CLARITY UR: CLEAR
COLOR UR: YELLOW
COMMENT UA: ABNORMAL
GLUCOSE UR STRIP-MCNC: NEGATIVE MG/DL
HGB UR QL STRIP.AUTO: NEGATIVE
KETONES UR STRIP-MCNC: NEGATIVE MG/DL
LEUKOCYTE ESTERASE UR QL STRIP: NEGATIVE
NITRITE UR QL STRIP: NEGATIVE
PH UR STRIP: 7 [PH] (ref 5–8)
PROT UR STRIP-MCNC: NEGATIVE MG/DL
SP GR UR STRIP: 1 (ref 1–1.03)
UROBILINOGEN UR STRIP-ACNC: NORMAL

## 2023-06-21 PROCEDURE — 99214 OFFICE O/P EST MOD 30 MIN: CPT | Performed by: NURSE PRACTITIONER

## 2023-06-21 PROCEDURE — G8427 DOCREV CUR MEDS BY ELIG CLIN: HCPCS | Performed by: NURSE PRACTITIONER

## 2023-06-21 PROCEDURE — G8417 CALC BMI ABV UP PARAM F/U: HCPCS | Performed by: NURSE PRACTITIONER

## 2023-06-21 PROCEDURE — 4004F PT TOBACCO SCREEN RCVD TLK: CPT | Performed by: NURSE PRACTITIONER

## 2023-06-21 RX ORDER — LORATADINE/PSEUDOEPHEDRINE 10MG-240MG
TABLET, EXTENDED RELEASE 24 HR ORAL
COMMUNITY
Start: 2023-06-17

## 2023-06-21 ASSESSMENT — PATIENT HEALTH QUESTIONNAIRE - PHQ9
2. FEELING DOWN, DEPRESSED OR HOPELESS: 0
SUM OF ALL RESPONSES TO PHQ QUESTIONS 1-9: 0
1. LITTLE INTEREST OR PLEASURE IN DOING THINGS: 0
SUM OF ALL RESPONSES TO PHQ QUESTIONS 1-9: 0
SUM OF ALL RESPONSES TO PHQ9 QUESTIONS 1 & 2: 0

## 2023-06-21 NOTE — PROGRESS NOTES
[place] [address]  [id number] [facility]. [address]  [phone]  [phone]    Randol Patch is a 50 y.o. male who is here with c/o of:    Chief Complaint: Follow-up ([facility] follow up UTI)      Patient Accompanied by: n/a    HPI - Randol Patch is here today with c/o:    Patient here for follow up urgent care. Reports he was having increased frequency and urgency of urination. He says that he felt like he could not go when he would try. He was dx with UTI. Reports he completed antibiotics. Denies any urinary symptoms. He says that he has had 3 UTI's in the last 2 years. Health Maintenance Due   Topic Date Due    Colorectal Cancer Screen  Never done    COVID-19 Vaccine (4 - Booster for Pfizer series) 02/13/2022    A1C test (Diabetic or Prediabetic)  02/23/2023        Patient Active Problem List:     Morbid obesity due to excess calories ([address]     Homicidal ideation     Schizophrenia ([address]     Smoker unmotivated to quit     IFG (impaired fasting glucose)     Past Medical History:   Diagnosis Date    Depression     Hypertriglyceridemia     Obesity       Past Surgical History:   Procedure Laterality Date    TONSILLECTOMY       Family History   Problem Relation Age of Onset    Cancer Mother         breast    Other Father         COPD    Cancer Father         prostate    Diabetes Sister      Social History     Tobacco Use    Smoking status: Every Day     Packs/day: 1.00     Years: 29.00     Pack years: 29.00     Types: Cigarettes    Smokeless tobacco: Never   Substance Use Topics    Alcohol use: No     ALLERGIES:    Allergies   Allergen Reactions    Codeine Other (See Comments)     hallucinates           Subjective   Review of Systems   Constitutional:  Negative for activity change, appetite change,unexpected weight change, chills, fever, and fatigue. HENT: Negative for ear pain, sore throat,  Rhinorrhea, sinus pain, sinus pressure, congestion.     Eyes:  Negative for pain and

## 2023-06-22 LAB
MICROORGANISM SPEC CULT: NO GROWTH
SPECIMEN DESCRIPTION: NORMAL

## 2023-07-17 ENCOUNTER — OFFICE VISIT (OUTPATIENT)
Age: 48
End: 2023-07-17
Payer: COMMERCIAL

## 2023-07-17 VITALS
HEIGHT: 74 IN | DIASTOLIC BLOOD PRESSURE: 94 MMHG | WEIGHT: 315 LBS | HEART RATE: 88 BPM | SYSTOLIC BLOOD PRESSURE: 151 MMHG | BODY MASS INDEX: 40.43 KG/M2

## 2023-07-17 DIAGNOSIS — N39.0 RECURRENT UTI (URINARY TRACT INFECTION): Primary | ICD-10-CM

## 2023-07-17 DIAGNOSIS — R35.1 NOCTURIA: ICD-10-CM

## 2023-07-17 LAB
BILIRUBIN, POC: NORMAL
BLOOD URINE, POC: NORMAL
CLARITY, POC: CLEAR
COLOR, POC: YELLOW
GLUCOSE URINE, POC: NORMAL
KETONES, POC: NORMAL
LEUKOCYTE EST, POC: NORMAL
NITRITE, POC: NORMAL
PH, POC: NORMAL
POST VOID RESIDUAL (PVR): NORMAL ML
PROTEIN, POC: NORMAL
SPECIFIC GRAVITY, POC: NORMAL
UROBILINOGEN, POC: NORMAL

## 2023-07-17 PROCEDURE — 81003 URINALYSIS AUTO W/O SCOPE: CPT | Performed by: SPECIALIST

## 2023-07-17 PROCEDURE — 51798 US URINE CAPACITY MEASURE: CPT | Performed by: SPECIALIST

## 2023-07-17 PROCEDURE — G8417 CALC BMI ABV UP PARAM F/U: HCPCS | Performed by: SPECIALIST

## 2023-07-17 PROCEDURE — G8427 DOCREV CUR MEDS BY ELIG CLIN: HCPCS | Performed by: SPECIALIST

## 2023-07-17 PROCEDURE — 99204 OFFICE O/P NEW MOD 45 MIN: CPT | Performed by: SPECIALIST

## 2023-07-17 PROCEDURE — 4004F PT TOBACCO SCREEN RCVD TLK: CPT | Performed by: SPECIALIST

## 2023-07-17 RX ORDER — AZITHROMYCIN 250 MG/1
TABLET, FILM COATED ORAL
COMMUNITY
Start: 2023-07-14

## 2023-07-17 NOTE — PROGRESS NOTES
[facility] [address] Urology Consultation / New Patient Visit    Patient:  Mahendra Rodriguez  YOB: 1975  Date: 7/17/2023    HISTORY OF PRESENT ILLNESS:   The patient is a 50 y.o. male who presents today for evaluation of the following problems:   Recurrent UTI's:  Patient is here today for Recurrent UTI's which started 2 year(s) ago. The last infection was June 2023. The patient has had few infections the past year.   Urine C&S results since last office visit: see below   Current Rx for recurrent UTI's: none  Lower urinary tract symptoms: nocturia, 2 times per night     Lower urinary tract symptoms: nocturia, 2 times per night   Today's AUA Symptom Score (QOL): 2 (6)  (Patient's old records have been requested, reviewed and summarized in today's note: 6/21/23 office note of AMY Matta CNP)    Summary of old records:   Recurrent UTI: 10/22/21 Citrobacter; PVR = 35 mL; needs renal and bladder US, uroflow  Excessive bladder irritant consumption: 2-3 coffee/d, 2-3 tea/d, 6 pops/d    Procedures Today: Postvoid Residual:  Post-void Residual by bladder scanner: 35 mL (7/17/23)     Urinalysis today:  Results for POC orders placed in visit on 07/17/23   POCT Urinalysis No Micro (Auto)   Result Value Ref Range    Color, UA yellow     Clarity, UA clear     Glucose, UA POC neg     Bilirubin, UA      Ketones, UA      Spec Grav, UA      Blood, UA POC trace-lysed     pH, UA      Protein, UA POC neg     Urobilinogen, UA      Leukocytes, UA neg     Nitrite, UA neg        Last several PSA's:  No results found for: PSA    Last total testosterone:  No results found for: TESTOSTERONE    Last BUN and creatinine:  Lab Results   Component Value Date    BUN 13 02/23/2022     Lab Results   Component Value Date    CREATININE 0.71 02/23/2022       Last CBC:  Lab Results   Component Value Date    WBC 8.5 02/23/2022    HGB 15.4 02/23/2022    HCT 46.1 02/23/2022    MCV 90.4 02/23/2022    PLT

## 2023-07-18 ENCOUNTER — HOSPITAL ENCOUNTER (OUTPATIENT)
Dept: ULTRASOUND IMAGING | Age: 48
Discharge: HOME | End: 2023-07-20
Attending: SPECIALIST
Payer: COMMERCIAL

## 2023-07-18 DIAGNOSIS — N39.0 RECURRENT UTI (URINARY TRACT INFECTION): ICD-10-CM

## 2023-07-18 PROCEDURE — 76770 US EXAM ABDO BACK WALL COMP: CPT

## 2023-07-20 ENCOUNTER — HOSPITAL ENCOUNTER (OUTPATIENT)
Age: 48
Setting detail: SPECIMEN
Discharge: HOME | End: 2023-07-20

## 2023-07-20 DIAGNOSIS — E66.01 MORBID OBESITY DUE TO EXCESS CALORIES (HCC): ICD-10-CM

## 2023-07-20 DIAGNOSIS — R73.01 IFG (IMPAIRED FASTING GLUCOSE): ICD-10-CM

## 2023-07-20 DIAGNOSIS — F17.200 SMOKER UNMOTIVATED TO QUIT: ICD-10-CM

## 2023-07-20 DIAGNOSIS — F20.9 SCHIZOPHRENIA, UNSPECIFIED TYPE (HCC): ICD-10-CM

## 2023-07-20 LAB
ALBUMIN SERPL-MCNC: 4.1 G/DL (ref 3.5–5.2)
ALBUMIN/GLOB SERPL: 1.2 {RATIO} (ref 1–2.5)
ALP SERPL-CCNC: 78 U/L (ref 40–129)
ALT SERPL-CCNC: 21 U/L (ref 5–41)
ANION GAP SERPL CALCULATED.3IONS-SCNC: 12 MMOL/L (ref 9–17)
AST SERPL-CCNC: 21 U/L
BASOPHILS # BLD: 0.05 K/UL (ref 0–0.2)
BASOPHILS NFR BLD: 1 % (ref 0–2)
BILIRUB SERPL-MCNC: 0.6 MG/DL (ref 0.3–1.2)
BUN SERPL-MCNC: 9 MG/DL (ref 6–20)
CALCIUM SERPL-MCNC: 9.5 MG/DL (ref 8.6–10.4)
CHLORIDE SERPL-SCNC: 105 MMOL/L (ref 98–107)
CHOLEST SERPL-MCNC: 151 MG/DL
CHOLESTEROL/HDL RATIO: 4.9
CO2 SERPL-SCNC: 23 MMOL/L (ref 20–31)
CREAT SERPL-MCNC: 0.8 MG/DL (ref 0.7–1.2)
EOSINOPHIL # BLD: 0.15 K/UL (ref 0–0.44)
EOSINOPHILS RELATIVE PERCENT: 2 % (ref 1–4)
ERYTHROCYTE [DISTWIDTH] IN BLOOD BY AUTOMATED COUNT: 13.2 % (ref 11.8–14.4)
EST. AVERAGE GLUCOSE BLD GHB EST-MCNC: 103 MG/DL
GFR SERPL CREATININE-BSD FRML MDRD: >60 ML/MIN/1.73M2
GLUCOSE SERPL-MCNC: 91 MG/DL (ref 70–99)
HBA1C MFR BLD: 5.2 % (ref 4–6)
HCT VFR BLD AUTO: 48.6 % (ref 40.7–50.3)
HDLC SERPL-MCNC: 31 MG/DL
HGB BLD-MCNC: 15.6 G/DL (ref 13–17)
IMM GRANULOCYTES # BLD AUTO: 0.03 K/UL (ref 0–0.3)
IMM GRANULOCYTES NFR BLD: 0 %
LDLC SERPL CALC-MCNC: 89 MG/DL (ref 0–130)
LYMPHOCYTES NFR BLD: 2.93 K/UL (ref 1.1–3.7)
LYMPHOCYTES RELATIVE PERCENT: 38 % (ref 24–43)
MCH RBC QN AUTO: 29.8 PG (ref 25.2–33.5)
MCHC RBC AUTO-ENTMCNC: 32.1 G/DL (ref 28.4–34.8)
MCV RBC AUTO: 92.7 FL (ref 82.6–102.9)
MONOCYTES NFR BLD: 0.53 K/UL (ref 0.1–1.2)
MONOCYTES NFR BLD: 7 % (ref 3–12)
NEUTROPHILS NFR BLD: 52 % (ref 36–65)
NEUTS SEG NFR BLD: 4 K/UL (ref 1.5–8.1)
NRBC BLD-RTO: 0 PER 100 WBC
PLATELET # BLD AUTO: 205 K/UL (ref 138–453)
PMV BLD AUTO: 10.6 FL (ref 8.1–13.5)
POTASSIUM SERPL-SCNC: 4.2 MMOL/L (ref 3.7–5.3)
PROT SERPL-MCNC: 7.5 G/DL (ref 6.4–8.3)
RBC # BLD AUTO: 5.24 M/UL (ref 4.21–5.77)
SODIUM SERPL-SCNC: 140 MMOL/L (ref 135–144)
TRIGL SERPL-MCNC: 156 MG/DL
TSH SERPL DL<=0.05 MIU/L-ACNC: 1.64 UIU/ML (ref 0.3–5)
WBC OTHER # BLD: 7.7 K/UL (ref 3.5–11.3)

## 2023-07-21 DIAGNOSIS — E78.2 MIXED HYPERLIPIDEMIA: Primary | ICD-10-CM

## 2023-07-21 RX ORDER — ROSUVASTATIN CALCIUM 10 MG/1
10 TABLET, COATED ORAL NIGHTLY
Qty: 30 TABLET | Refills: 3 | Status: SHIPPED | OUTPATIENT
Start: 2023-07-21

## 2023-07-31 ENCOUNTER — OFFICE VISIT (OUTPATIENT)
Dept: PODIATRY | Age: 48
End: 2023-07-31
Payer: COMMERCIAL

## 2023-07-31 ENCOUNTER — OFFICE VISIT (OUTPATIENT)
Dept: DERMATOLOGY | Age: 48
End: 2023-07-31
Payer: COMMERCIAL

## 2023-07-31 VITALS
DIASTOLIC BLOOD PRESSURE: 74 MMHG | TEMPERATURE: 97.9 F | HEART RATE: 82 BPM | SYSTOLIC BLOOD PRESSURE: 113 MMHG | BODY MASS INDEX: 44.99 KG/M2 | WEIGHT: 315 LBS | OXYGEN SATURATION: 98 %

## 2023-07-31 VITALS — WEIGHT: 315 LBS | HEIGHT: 74 IN | BODY MASS INDEX: 40.43 KG/M2

## 2023-07-31 DIAGNOSIS — M79.605 BILATERAL LOWER EXTREMITY PAIN: ICD-10-CM

## 2023-07-31 DIAGNOSIS — L30.1 DYSHIDROTIC ECZEMA: Primary | ICD-10-CM

## 2023-07-31 DIAGNOSIS — B35.1 DERMATOPHYTOSIS OF NAIL: Primary | ICD-10-CM

## 2023-07-31 DIAGNOSIS — M79.604 BILATERAL LOWER EXTREMITY PAIN: ICD-10-CM

## 2023-07-31 DIAGNOSIS — R23.4 FISSURE IN SKIN OF FOOT: ICD-10-CM

## 2023-07-31 DIAGNOSIS — R60.0 EDEMA OF LOWER EXTREMITY: ICD-10-CM

## 2023-07-31 DIAGNOSIS — L60.0 INGROWN NAIL: ICD-10-CM

## 2023-07-31 PROCEDURE — 99213 OFFICE O/P EST LOW 20 MIN: CPT | Performed by: PODIATRIST

## 2023-07-31 PROCEDURE — 11721 DEBRIDE NAIL 6 OR MORE: CPT | Performed by: PODIATRIST

## 2023-07-31 PROCEDURE — G8427 DOCREV CUR MEDS BY ELIG CLIN: HCPCS | Performed by: PHYSICIAN ASSISTANT

## 2023-07-31 PROCEDURE — G8417 CALC BMI ABV UP PARAM F/U: HCPCS | Performed by: PHYSICIAN ASSISTANT

## 2023-07-31 PROCEDURE — G8427 DOCREV CUR MEDS BY ELIG CLIN: HCPCS | Performed by: PODIATRIST

## 2023-07-31 PROCEDURE — 4004F PT TOBACCO SCREEN RCVD TLK: CPT | Performed by: PHYSICIAN ASSISTANT

## 2023-07-31 PROCEDURE — 99213 OFFICE O/P EST LOW 20 MIN: CPT | Performed by: PHYSICIAN ASSISTANT

## 2023-07-31 PROCEDURE — 4004F PT TOBACCO SCREEN RCVD TLK: CPT | Performed by: PODIATRIST

## 2023-07-31 PROCEDURE — G8417 CALC BMI ABV UP PARAM F/U: HCPCS | Performed by: PODIATRIST

## 2023-07-31 RX ORDER — DOXYCYCLINE HYCLATE 100 MG/1
CAPSULE ORAL
COMMUNITY
Start: 2023-05-18

## 2023-07-31 RX ORDER — SULFAMETHOXAZOLE AND TRIMETHOPRIM 800; 160 MG/1; MG/1
TABLET ORAL
COMMUNITY
Start: 2023-06-14

## 2023-07-31 RX ORDER — BENZONATATE 100 MG/1
CAPSULE ORAL
COMMUNITY
Start: 2023-07-14

## 2023-07-31 NOTE — PROGRESS NOTES
[address] [facility],[address] PODIATRY [address] [place] [address]  Dept: [phone]  Dept Fax: [phone]     PAIN PROGRESS NOTE  Date of patient's visit: 7/31/2023  Patient's Name:  Jazmin Pulido YOB: 1975            Patient Care Team:  AMY Lopez CNP as PCP - General (Nurse Practitioner)  AMY Lopez CNP as PCP - Empaneled Provider  Xuan Ndiaye DPM as Physician (Podiatry)      Chief Complaint   Patient presents with    Nail Problem     Toenail trim       Subjective: This Jazmin Pulido comes to clinic for foot and nail care. Pt currently has complaint of thickened, painful, elongated nails that he/she cannot manage by themselves. Pt. Relates pain to nails with shoe gear. Pt's primary care physician is AMY Lopez CNP last seen 06/21/2023. Pt has a new complaint of sore to right 5th toe.   Pt has tried changing shoes but it has not helped the pain    Past Medical History:   Diagnosis Date    Arthritis     Caffeine use     2-3 cups coffee and 2-3 cups tea/day    Depression     Hypertriglyceridemia     Obesity     Schizophrenia (HCC)        Allergies   Allergen Reactions    Codeine Other (See Comments)     hallucinates      Current Outpatient Medications on File Prior to Visit   Medication Sig Dispense Refill    Multiple Vitamin (MULTIVITAMIN) tablet TAKE 1 TAB BY MOUTH ONCE EVERY DAY 30 tablet 5    vitamin C (ASCORBIC ACID) 500 MG tablet TAKE 2 TABS BY MOUTH ONCE EVERY DAY 60 tablet 5    ARIPiprazole (ABILIFY) 5 MG tablet Take 1 tablet by mouth daily      rosuvastatin (CRESTOR) 10 MG tablet Take 1 tablet by mouth nightly (Patient not taking: Reported on 7/31/2023) 30 tablet 3    azithromycin (ZITHROMAX) 250 MG tablet  (Patient not taking: Reported on 7/31/2023)      LORATADINE-D 24HR  MG per extended release tablet  (Patient not taking: Reported on 7/31/2023)      albuterol sulfate HFA

## 2023-08-01 NOTE — PROGRESS NOTES
Dermatology Patient Note  [address]  [address] [facility] [address] [place] [address]  Dept: [phone]  Dept Fax: [phone]      VISITDATE: 7/31/2023   REFERRING PROVIDER: No ref. provider found      Trino Vasquez is a 50 y.o. male  who presents today in the office for:    Follow-up (Eczema)      HISTORY OF PRESENT ILLNESS:  Pt states that his dishydrotic eczema does well with prn clobetasol use, but admits that he has not been compliant with its use lately. His hands are slightly pruritic at this time. He states that Winter is his worst time of year.     MEDICAL PROBLEMS:  Patient Active Problem List    Diagnosis Date Noted    Recurrent UTI (urinary tract infection) 07/17/2023    Nocturia 07/17/2023    IFG (impaired fasting glucose) 01/19/2022    Smoker unmotivated to quit 12/21/2020    Schizophrenia ([address] [facility]) 08/24/2018    Homicidal ideation 08/22/2018    Morbid obesity due to excess calories ([address] [facility]) 03/27/2017       CURRENT MEDICATIONS:   Current Outpatient Medications   Medication Sig Dispense Refill    Multiple Vitamin (MULTIVITAMIN) tablet TAKE 1 TAB BY MOUTH ONCE EVERY DAY 30 tablet 5    vitamin C (ASCORBIC ACID) 500 MG tablet TAKE 2 TABS BY MOUTH ONCE EVERY DAY 60 tablet 5    ARIPiprazole (ABILIFY) 5 MG tablet Take 1 tablet by mouth daily      benzonatate (TESSALON) 100 MG capsule  (Patient not taking: Reported on 7/31/2023)      doxycycline hyclate (VIBRAMYCIN) 100 MG capsule  (Patient not taking: Reported on 7/31/2023)      sulfamethoxazole-trimethoprim (BACTRIM DS;SEPTRA DS) 800-160 MG per tablet  (Patient not taking: Reported on 7/31/2023)      rosuvastatin (CRESTOR) 10 MG tablet Take 1 tablet by mouth nightly (Patient not taking: Reported on 7/31/2023) 30 tablet 3    azithromycin (ZITHROMAX) 250 MG tablet  (Patient not taking: Reported on 7/31/2023)      LORATADINE-D 24HR  MG per extended release tablet  (Patient not

## 2023-08-02 ENCOUNTER — HOSPITAL ENCOUNTER (OUTPATIENT)
Age: 48
Discharge: HOME | End: 2023-08-02
Payer: COMMERCIAL

## 2023-08-02 ENCOUNTER — OFFICE VISIT (OUTPATIENT)
Age: 48
End: 2023-08-02

## 2023-08-02 VITALS — WEIGHT: 315 LBS | HEIGHT: 74 IN | BODY MASS INDEX: 40.43 KG/M2

## 2023-08-02 DIAGNOSIS — F20.9 SUBCHRONIC SCHIZOPHRENIA (HCC): Primary | ICD-10-CM

## 2023-08-02 DIAGNOSIS — R35.1 NOCTURIA: ICD-10-CM

## 2023-08-02 DIAGNOSIS — R35.0 FREQUENCY OF URINATION: ICD-10-CM

## 2023-08-02 DIAGNOSIS — F20.9 SCHIZOPHRENIA (HCC): ICD-10-CM

## 2023-08-02 DIAGNOSIS — N39.0 RECURRENT UTI (URINARY TRACT INFECTION): Primary | ICD-10-CM

## 2023-08-02 LAB
EKG ATRIAL RATE: 83 BPM
EKG P AXIS: 51 DEGREES
EKG P-R INTERVAL: 188 MS
EKG Q-T INTERVAL: 362 MS
EKG QRS DURATION: 92 MS
EKG QTC CALCULATION (BAZETT): 425 MS
EKG R AXIS: 82 DEGREES
EKG T AXIS: 37 DEGREES
EKG VENTRICULAR RATE: 83 BPM

## 2023-08-02 PROCEDURE — 93005 ELECTROCARDIOGRAM TRACING: CPT | Performed by: PSYCHIATRY & NEUROLOGY

## 2023-08-07 ENCOUNTER — HOSPITAL ENCOUNTER (OUTPATIENT)
Age: 48
Discharge: HOME | End: 2023-08-07
Payer: COMMERCIAL

## 2023-08-07 LAB
ALBUMIN SERPL-MCNC: 3.9 G/DL (ref 3.5–5.2)
ALBUMIN/GLOB SERPL: 1.2 {RATIO} (ref 1–2.5)
ALP SERPL-CCNC: 79 U/L (ref 40–129)
ALT SERPL-CCNC: 18 U/L (ref 5–41)
ANION GAP SERPL CALCULATED.3IONS-SCNC: 11 MMOL/L (ref 9–17)
AST SERPL-CCNC: 18 U/L
BASOPHILS # BLD: 0.06 K/UL (ref 0–0.2)
BASOPHILS NFR BLD: 1 % (ref 0–2)
BILIRUB DIRECT SERPL-MCNC: 0.1 MG/DL
BILIRUB INDIRECT SERPL-MCNC: 0.3 MG/DL (ref 0–1)
BILIRUB SERPL-MCNC: 0.4 MG/DL (ref 0.3–1.2)
BUN SERPL-MCNC: 10 MG/DL (ref 6–20)
CALCIUM SERPL-MCNC: 9 MG/DL (ref 8.6–10.4)
CHLORIDE SERPL-SCNC: 101 MMOL/L (ref 98–107)
CHOLEST SERPL-MCNC: 139 MG/DL
CHOLESTEROL/HDL RATIO: 4.1
CO2 SERPL-SCNC: 27 MMOL/L (ref 20–31)
CREAT SERPL-MCNC: 0.9 MG/DL (ref 0.7–1.2)
EOSINOPHIL # BLD: 0.25 K/UL (ref 0–0.44)
EOSINOPHILS RELATIVE PERCENT: 3 % (ref 1–4)
ERYTHROCYTE [DISTWIDTH] IN BLOOD BY AUTOMATED COUNT: 13 % (ref 11.8–14.4)
EST. AVERAGE GLUCOSE BLD GHB EST-MCNC: 117 MG/DL
GFR SERPL CREATININE-BSD FRML MDRD: >60 ML/MIN/1.73M2
GLUCOSE SERPL-MCNC: 97 MG/DL (ref 70–99)
HBA1C MFR BLD: 5.7 % (ref 4–6)
HCT VFR BLD AUTO: 43.5 % (ref 40.7–50.3)
HDLC SERPL-MCNC: 34 MG/DL
HGB BLD-MCNC: 15.1 G/DL (ref 13–17)
IMM GRANULOCYTES # BLD AUTO: 0.04 K/UL (ref 0–0.3)
IMM GRANULOCYTES NFR BLD: 1 %
LDLC SERPL CALC-MCNC: 88 MG/DL (ref 0–130)
LYMPHOCYTES NFR BLD: 2.99 K/UL (ref 1.1–3.7)
LYMPHOCYTES RELATIVE PERCENT: 35 % (ref 24–43)
MCH RBC QN AUTO: 31.5 PG (ref 25.2–33.5)
MCHC RBC AUTO-ENTMCNC: 34.7 G/DL (ref 28.4–34.8)
MCV RBC AUTO: 90.8 FL (ref 82.6–102.9)
MONOCYTES NFR BLD: 0.59 K/UL (ref 0.1–1.2)
MONOCYTES NFR BLD: 7 % (ref 3–12)
NEUTROPHILS NFR BLD: 53 % (ref 36–65)
NEUTS SEG NFR BLD: 4.51 K/UL (ref 1.5–8.1)
NRBC BLD-RTO: 0 PER 100 WBC
PLATELET # BLD AUTO: 180 K/UL (ref 138–453)
PMV BLD AUTO: 11.2 FL (ref 8.1–13.5)
POTASSIUM SERPL-SCNC: 3.8 MMOL/L (ref 3.7–5.3)
PROT SERPL-MCNC: 7.1 G/DL (ref 6.4–8.3)
RBC # BLD AUTO: 4.79 M/UL (ref 4.21–5.77)
SODIUM SERPL-SCNC: 139 MMOL/L (ref 135–144)
TRIGL SERPL-MCNC: 87 MG/DL
TSH SERPL DL<=0.05 MIU/L-ACNC: 2.07 UIU/ML (ref 0.3–5)
WBC OTHER # BLD: 8.4 K/UL (ref 3.5–11.3)

## 2023-08-07 PROCEDURE — 83036 HEMOGLOBIN GLYCOSYLATED A1C: CPT

## 2023-08-07 PROCEDURE — 85025 COMPLETE CBC W/AUTO DIFF WBC: CPT

## 2023-08-07 PROCEDURE — 84443 ASSAY THYROID STIM HORMONE: CPT

## 2023-08-07 PROCEDURE — 36415 COLL VENOUS BLD VENIPUNCTURE: CPT

## 2023-08-07 PROCEDURE — 80061 LIPID PANEL: CPT

## 2023-08-07 PROCEDURE — 82248 BILIRUBIN DIRECT: CPT

## 2023-08-07 PROCEDURE — 80053 COMPREHEN METABOLIC PANEL: CPT

## 2023-08-23 ENCOUNTER — TELEPHONE (OUTPATIENT)
Dept: FAMILY MEDICINE CLINIC | Age: 48
End: 2023-08-23

## 2023-08-23 NOTE — TELEPHONE ENCOUNTER
Left message to confirm if Cologuard kit received if not call office to re-order if so please try to complete  in few weeks

## 2023-08-24 NOTE — TELEPHONE ENCOUNTER
Patient called to let you know he did receive his cologuard and completed it and sent it in about 6 days ago

## 2023-08-26 LAB — NONINV COLON CA DNA+OCC BLD SCRN STL QL: NEGATIVE

## 2023-09-06 ENCOUNTER — TELEPHONE (OUTPATIENT)
Dept: FAMILY MEDICINE CLINIC | Age: 48
End: 2023-09-06

## 2023-09-06 NOTE — TELEPHONE ENCOUNTER
----- Message from Ace Davis sent at 9/5/2023  8:13 AM EDT -----  Subject: Appointment Request    Reason for Call: Established Patient Appointment needed: Routine ED Follow   Up Visit    QUESTIONS    Reason for appointment request? No appointments available during search     Additional Information for Provider? Patient is needing a urgent care f/u   for a UTI. Patients says to keep calling in order to get him.  ---------------------------------------------------------------------------  --------------  [address] [place]  [phone];  Do not leave any message, patient will call back for answer  ---------------------------------------------------------------------------  --------------  SCRIPT ANSWERS

## 2023-09-24 LAB
BUN / CREAT RATIO: NORMAL
BUN BLDV-MCNC: 12 MG/DL
CREAT SERPL-MCNC: 1.02 MG/DL

## 2023-09-25 ENCOUNTER — OFFICE VISIT (OUTPATIENT)
Age: 48
End: 2023-09-25
Payer: COMMERCIAL

## 2023-09-25 VITALS — WEIGHT: 315 LBS | BODY MASS INDEX: 41.75 KG/M2 | HEIGHT: 73 IN

## 2023-09-25 DIAGNOSIS — R35.1 NOCTURIA: ICD-10-CM

## 2023-09-25 DIAGNOSIS — N39.0 RECURRENT UTI (URINARY TRACT INFECTION): Primary | ICD-10-CM

## 2023-09-25 DIAGNOSIS — R31.29 MICROHEMATURIA: ICD-10-CM

## 2023-09-25 DIAGNOSIS — N39.0 RECURRENT UTI (URINARY TRACT INFECTION): ICD-10-CM

## 2023-09-25 DIAGNOSIS — R35.0 FREQUENCY OF URINATION: Primary | ICD-10-CM

## 2023-09-25 DIAGNOSIS — R35.0 FREQUENCY OF URINATION: ICD-10-CM

## 2023-09-25 LAB
BILIRUBIN, POC: NORMAL
BLOOD URINE, POC: NORMAL
CLARITY, POC: CLEAR
COLOR, POC: YELLOW
GLUCOSE URINE, POC: NORMAL
KETONES, POC: NORMAL
LEUKOCYTE EST, POC: NORMAL
NITRITE, POC: NORMAL
PH, POC: NORMAL
POST VOID RESIDUAL (PVR): 0 ML
PROTEIN, POC: NORMAL
SPECIFIC GRAVITY, POC: NORMAL
UROBILINOGEN, POC: NORMAL

## 2023-09-25 PROCEDURE — 99214 OFFICE O/P EST MOD 30 MIN: CPT | Performed by: SPECIALIST

## 2023-09-25 PROCEDURE — 4004F PT TOBACCO SCREEN RCVD TLK: CPT | Performed by: SPECIALIST

## 2023-09-25 PROCEDURE — G8417 CALC BMI ABV UP PARAM F/U: HCPCS | Performed by: SPECIALIST

## 2023-09-25 PROCEDURE — 51798 US URINE CAPACITY MEASURE: CPT | Performed by: SPECIALIST

## 2023-09-25 PROCEDURE — G8427 DOCREV CUR MEDS BY ELIG CLIN: HCPCS | Performed by: SPECIALIST

## 2023-09-25 PROCEDURE — 81003 URINALYSIS AUTO W/O SCOPE: CPT | Performed by: SPECIALIST

## 2023-09-25 RX ORDER — CIPROFLOXACIN 500 MG/1
TABLET, FILM COATED ORAL
COMMUNITY
Start: 2023-08-29

## 2023-09-29 ENCOUNTER — HOSPITAL ENCOUNTER (OUTPATIENT)
Dept: CT IMAGING | Facility: CLINIC | Age: 48
End: 2023-09-29
Attending: SPECIALIST
Payer: COMMERCIAL

## 2023-09-29 DIAGNOSIS — N39.0 RECURRENT UTI (URINARY TRACT INFECTION): ICD-10-CM

## 2023-09-29 DIAGNOSIS — R35.0 FREQUENCY OF URINATION: ICD-10-CM

## 2023-09-29 DIAGNOSIS — R31.29 MICROHEMATURIA: ICD-10-CM

## 2023-09-29 PROCEDURE — 2580000003 HC RX 258: Performed by: SPECIALIST

## 2023-09-29 PROCEDURE — 6360000004 HC RX CONTRAST MEDICATION: Performed by: SPECIALIST

## 2023-09-29 PROCEDURE — 74178 CT ABD&PLV WO CNTR FLWD CNTR: CPT | Performed by: SPECIALIST

## 2023-09-29 RX ORDER — SODIUM CHLORIDE 0.9 % (FLUSH) 0.9 %
10 SYRINGE (ML) INJECTION PRN
Status: DISCONTINUED | OUTPATIENT
Start: 2023-09-29 | End: 2023-10-02 | Stop reason: HOSPADM

## 2023-09-29 RX ORDER — 0.9 % SODIUM CHLORIDE 0.9 %
80 INTRAVENOUS SOLUTION INTRAVENOUS ONCE
Status: COMPLETED | OUTPATIENT
Start: 2023-09-29 | End: 2023-09-29

## 2023-09-29 RX ADMIN — IOPAMIDOL 120 ML: 755 INJECTION, SOLUTION INTRAVENOUS at 12:18

## 2023-09-29 RX ADMIN — SODIUM CHLORIDE, PRESERVATIVE FREE 10 ML: 5 INJECTION INTRAVENOUS at 12:19

## 2023-09-29 RX ADMIN — SODIUM CHLORIDE 80 ML: 9 INJECTION, SOLUTION INTRAVENOUS at 12:19

## 2023-10-03 ENCOUNTER — OFFICE VISIT (OUTPATIENT)
Dept: FAMILY MEDICINE CLINIC | Age: 48
End: 2023-10-03

## 2023-10-03 VITALS
SYSTOLIC BLOOD PRESSURE: 102 MMHG | TEMPERATURE: 97.6 F | OXYGEN SATURATION: 94 % | BODY MASS INDEX: 45.94 KG/M2 | WEIGHT: 315 LBS | HEART RATE: 94 BPM | DIASTOLIC BLOOD PRESSURE: 60 MMHG

## 2023-10-03 DIAGNOSIS — E66.01 MORBID OBESITY DUE TO EXCESS CALORIES (HCC): ICD-10-CM

## 2023-10-03 DIAGNOSIS — Z00.00 ENCOUNTER FOR WELL ADULT EXAM WITHOUT ABNORMAL FINDINGS: Primary | ICD-10-CM

## 2023-10-03 RX ORDER — SULFAMETHOXAZOLE AND TRIMETHOPRIM 800; 160 MG/1; MG/1
TABLET ORAL
COMMUNITY
Start: 2023-09-25

## 2023-10-03 ASSESSMENT — PATIENT HEALTH QUESTIONNAIRE - PHQ9
SUM OF ALL RESPONSES TO PHQ QUESTIONS 1-9: 0
SUM OF ALL RESPONSES TO PHQ QUESTIONS 1-9: 0
2. FEELING DOWN, DEPRESSED OR HOPELESS: 0
SUM OF ALL RESPONSES TO PHQ9 QUESTIONS 1 & 2: 0
SUM OF ALL RESPONSES TO PHQ QUESTIONS 1-9: 0
SUM OF ALL RESPONSES TO PHQ QUESTIONS 1-9: 0
1. LITTLE INTEREST OR PLEASURE IN DOING THINGS: 0

## 2023-10-03 NOTE — PROGRESS NOTES
series) Never done    COVID-19 Vaccine (4 - Pfizer series) 02/13/2022    Pneumococcal 0-64 years Vaccine (2 - PCV) 05/25/2023    Flu vaccine (1) 08/01/2023    Low dose CT lung screening &/or counseling  Never done    Shingles vaccine (1 of 2) 10/03/2024 (Originally 9/28/2023)    Depression Screen  06/21/2024    A1C test (Diabetic or Prediabetic)  08/07/2024    Lipids  08/07/2024    Colorectal Cancer Screen  08/21/2026    DTaP/Tdap/Td vaccine (2 - Td or Tdap) 01/19/2032    Hepatitis C screen  Completed    HIV screen  Completed    Hepatitis A vaccine  Aged Out    Hib vaccine  Aged Out    Meningococcal (ACWY) vaccine  Aged Out    Diabetes screen  Discontinued     Recommendations for Leapset Due: see orders and patient instructions/AVS.    Return in about 1 year (around 10/3/2024) for annual physical.

## 2023-10-24 DIAGNOSIS — E78.2 MIXED HYPERLIPIDEMIA: ICD-10-CM

## 2023-10-24 DIAGNOSIS — Z78.9 TAKES DAILY MULTIVITAMINS: ICD-10-CM

## 2023-10-24 RX ORDER — ROSUVASTATIN CALCIUM 10 MG/1
TABLET, COATED ORAL
Qty: 30 TABLET | Refills: 3 | OUTPATIENT
Start: 2023-10-24

## 2023-10-24 RX ORDER — ASCORBIC ACID 500 MG
TABLET ORAL
Qty: 60 TABLET | Refills: 5 | Status: SHIPPED | OUTPATIENT
Start: 2023-10-24

## 2023-10-24 RX ORDER — MULTIVITAMIN WITH FOLIC ACID 400 MCG
TABLET ORAL
Qty: 30 TABLET | Refills: 5 | Status: SHIPPED | OUTPATIENT
Start: 2023-10-24

## 2023-10-24 NOTE — TELEPHONE ENCOUNTER
Seb Malave is calling to request a refill on the following medication(s):    Medication Request:  Requested Prescriptions     Pending Prescriptions Disp Refills    vitamin C (ASCORBIC ACID) 500 MG tablet [Pharmacy Med Name: VITAMIN C 500 MG TABLET] 60 tablet 5     Sig: TAKE 2 TABS BY MOUTH ONCE EVERY DAY    Multiple Vitamin (MULTIVITAMIN) tablet [Pharmacy Med Name: TAB-A-INGE TABLET] 30 tablet 5     Sig: TAKE 1 TAB BY MOUTH ONCE EVERY DAY     Refused Prescriptions Disp Refills    rosuvastatin (CRESTOR) 10 MG tablet [Pharmacy Med Name: ROSUVASTATIN CALCIUM 10 MG TAB] 30 tablet 3     Sig: TAKE 1 TAB BY MOUTH DAILY AT BEDTIME       Last Visit Date (If Applicable):  49/0/3855    Next Visit Date:    10/4/2024

## 2023-10-25 ENCOUNTER — OFFICE VISIT (OUTPATIENT)
Age: 48
End: 2023-10-25
Payer: COMMERCIAL

## 2023-10-25 DIAGNOSIS — R35.1 NOCTURIA: ICD-10-CM

## 2023-10-25 DIAGNOSIS — N39.0 RECURRENT UTI (URINARY TRACT INFECTION): Primary | ICD-10-CM

## 2023-10-25 DIAGNOSIS — E29.1 HYPOGONADISM IN MALE: ICD-10-CM

## 2023-10-25 PROCEDURE — 4004F PT TOBACCO SCREEN RCVD TLK: CPT | Performed by: SPECIALIST

## 2023-10-25 PROCEDURE — G8417 CALC BMI ABV UP PARAM F/U: HCPCS | Performed by: SPECIALIST

## 2023-10-25 PROCEDURE — G8427 DOCREV CUR MEDS BY ELIG CLIN: HCPCS | Performed by: SPECIALIST

## 2023-10-25 PROCEDURE — 99214 OFFICE O/P EST MOD 30 MIN: CPT | Performed by: SPECIALIST

## 2023-10-25 PROCEDURE — G8484 FLU IMMUNIZE NO ADMIN: HCPCS | Performed by: SPECIALIST

## 2023-10-25 PROCEDURE — 3017F COLORECTAL CA SCREEN DOC REV: CPT | Performed by: SPECIALIST

## 2023-10-25 NOTE — PROGRESS NOTES
[facility] [address] Urology Office Progress Note    Patient:  Amilcar Florez  YOB: 1975  Date: 10/25/2023    HISTORY OF PRESENT ILLNESS:   The patient is a 46 y.o. male  The patient presents with recurrent UTI's. His 9/24/23 urine C&S showed E coli. Urine sent for C&S to rule out persistent infection. Patient declined a Cystoscopy to evaluate lower urinary tract. His 9/29/23 CT urogram was negative. Patient instructed to avoid bladder irritants in diet such as coffee, tea, caffeine, alcohol, carbonated beverages, spicy/acidic foods to minimize his urgency and frequency. Patient complains of symptoms of hypogonadism: ED, decreased libido, loss of muscle mass. Will order a serum free and total testosterone (8-10 AM only) to rule out hypogonadism. Lower urinary tract symptoms: nocturia, 2 times per night   Last AUA Symptom Score (QOL): 3 (0)  Today's AUA Symptom Score (QOL): 2 (2)    Summary of old records:   Recurrent UTI: 10/22/21 Citrobacter, 9/24/23 E coli; PVR = 35 mL; 7/18/23 renal and bladder US neg, 8/2/23 uroflow: 480 mL, 24.2/16 mL/sec; declined a cystoscopy  Excessive bladder irritant consumption: 2-3 coffee/d, 2-3 tea/d, 6 pops/d; now only 2 coffee/d  Microhematuria: 9/24/23 CT wo contrast neg; 9/29/23 CT urogram neg  Hypogonadism: check serum free and total testosterone (8-10 AM only)     Additional History: none    Procedures Today: N/A    Urinalysis today:  No results found for this visit on 10/25/23.     Last several PSA's:  No results found for: \"PSA\"    Last total testosterone:  No results found for: \"TESTOSTERONE\"    Last BUN and creatinine:  Lab Results   Component Value Date    BUN 12 09/24/2023     Lab Results   Component Value Date    CREATININE 1.02 09/24/2023       Last CBC:  Lab Results   Component Value Date    WBC 8.4 08/07/2023    HGB 15.1 08/07/2023    HCT 43.5 08/07/2023    MCV 90.8 08/07/2023     08/07/2023       Additional

## 2023-11-13 ENCOUNTER — HOSPITAL ENCOUNTER (OUTPATIENT)
Age: 48
Discharge: HOME | End: 2023-11-13
Payer: COMMERCIAL

## 2023-11-13 DIAGNOSIS — E29.1 HYPOGONADISM IN MALE: ICD-10-CM

## 2023-11-13 DIAGNOSIS — N39.0 RECURRENT UTI (URINARY TRACT INFECTION): ICD-10-CM

## 2023-11-13 PROCEDURE — 84270 ASSAY OF SEX HORMONE GLOBUL: CPT

## 2023-11-13 PROCEDURE — 36415 COLL VENOUS BLD VENIPUNCTURE: CPT

## 2023-11-13 PROCEDURE — 87086 URINE CULTURE/COLONY COUNT: CPT

## 2023-11-13 PROCEDURE — 84403 ASSAY OF TOTAL TESTOSTERONE: CPT

## 2023-11-14 LAB
MICROORGANISM SPEC CULT: NORMAL
SPECIMEN DESCRIPTION: NORMAL

## 2023-11-15 LAB
SHBG SERPL-SCNC: 47 NMOL/L (ref 11–80)
TESTOST FREE MFR SERPL: 37 PG/ML (ref 47–244)
TESTOST SERPL-MCNC: 241 NG/DL (ref 220–1000)
TESTOSTERONE, BIOAVAILABLE: 86.8 NG/DL (ref 130–680)

## 2023-12-11 ENCOUNTER — OFFICE VISIT (OUTPATIENT)
Age: 48
End: 2023-12-11
Payer: COMMERCIAL

## 2023-12-11 DIAGNOSIS — R35.1 NOCTURIA: ICD-10-CM

## 2023-12-11 DIAGNOSIS — N40.0 BPH WITHOUT URINARY OBSTRUCTION: ICD-10-CM

## 2023-12-11 DIAGNOSIS — E29.1 HYPOGONADISM IN MALE: Primary | ICD-10-CM

## 2023-12-11 PROCEDURE — 3017F COLORECTAL CA SCREEN DOC REV: CPT | Performed by: SPECIALIST

## 2023-12-11 PROCEDURE — 4004F PT TOBACCO SCREEN RCVD TLK: CPT | Performed by: SPECIALIST

## 2023-12-11 PROCEDURE — 99214 OFFICE O/P EST MOD 30 MIN: CPT | Performed by: SPECIALIST

## 2023-12-11 PROCEDURE — G8417 CALC BMI ABV UP PARAM F/U: HCPCS | Performed by: SPECIALIST

## 2023-12-11 PROCEDURE — G8427 DOCREV CUR MEDS BY ELIG CLIN: HCPCS | Performed by: SPECIALIST

## 2023-12-11 PROCEDURE — G8484 FLU IMMUNIZE NO ADMIN: HCPCS | Performed by: SPECIALIST

## 2023-12-11 RX ORDER — TESTOSTERONE 40.5 MG/2.5G
5 GEL TOPICAL DAILY
Qty: 150 G | Refills: 5 | Status: SHIPPED | OUTPATIENT
Start: 2023-12-11 | End: 2024-06-08

## 2023-12-11 NOTE — PROGRESS NOTES
[facility], [address] [place] [address] Urology Office Progress Note    Patient:  Leon Brice  YOB: 1975  Date: 12/11/2023    HISTORY OF PRESENT ILLNESS:   The patient is a 46 y.o. male  The patient presents with symptomatic hypogonadism. His total testosterone was low at 241. Patient wants to try Androgel 1.62% 2 pumps topically qd for hypogonadism. Will check a PSA, H/H in 6 months. BPH symptoms are mild and not bothersome and thus medical therapy not indicated. Lower urinary tract symptoms: nocturia, 2 times per night   Last AUA Symptom Score (QOL): 2 (2)  Today's AUA Symptom Score (QOL): 2 (1)    Summary of old records:   Recurrent UTI: 10/22/21 Citrobacter, 9/24/23 E coli; PVR = 35 mL; 7/18/23 renal and bladder US neg, 8/2/23 uroflow: 480 mL, 24.2/16 mL/sec; declined a cystoscopy  Excessive bladder irritant consumption: 2-3 coffee/d, 2-3 tea/d, 6 pops/d; now only 2 coffee/d  Microhematuria: 9/24/23 CT wo contrast neg; 9/29/23 CT urogram neg  Hypogonadism: free and total testosterone=37/241: Rx for Androgel 1.62% 2 pumps topically qd 12/11/23     Additional History: none    Procedures Today: Discussed risk of testosterone replacement therapy including acne, hair growth, aggressive personality changes, polycythemia, testicular atrophy, worsening sleep apnea, breast enlargement, infertility/reduced sperm production, prostate enlargement, accelerated growth of undiagnosed prostate cancer, etc.     Urinalysis today:  No results found for this visit on 12/11/23.     Last several PSA's:  No results found for: \"PSA\"    Last total testosterone:  Lab Results   Component Value Date    TESTOSTERONE 241 11/13/2023       Last BUN and creatinine:  Lab Results   Component Value Date    BUN 12 09/24/2023     Lab Results   Component Value Date    CREATININE 1.02 09/24/2023       Last CBC:  Lab Results   Component Value Date    WBC 8.4 08/07/2023    HGB 15.1 08/07/2023    HCT 43.5 08/07/2023

## 2023-12-13 ENCOUNTER — HOSPITAL ENCOUNTER (OUTPATIENT)
Age: 48
Discharge: HOME | End: 2023-12-13
Payer: COMMERCIAL

## 2023-12-13 DIAGNOSIS — E29.1 HYPOGONADISM IN MALE: ICD-10-CM

## 2023-12-13 LAB
HCT VFR BLD AUTO: 45.6 % (ref 40.7–50.3)
HGB BLD-MCNC: 15.2 G/DL (ref 13–17)

## 2023-12-13 PROCEDURE — 85014 HEMATOCRIT: CPT

## 2023-12-13 PROCEDURE — 36415 COLL VENOUS BLD VENIPUNCTURE: CPT

## 2023-12-13 PROCEDURE — 85018 HEMOGLOBIN: CPT

## 2024-01-02 ENCOUNTER — OFFICE VISIT (OUTPATIENT)
Dept: FAMILY MEDICINE CLINIC | Age: 49
End: 2024-01-02
Payer: COMMERCIAL

## 2024-01-02 VITALS
BODY MASS INDEX: 40.43 KG/M2 | DIASTOLIC BLOOD PRESSURE: 62 MMHG | TEMPERATURE: 98.5 F | HEIGHT: 74 IN | WEIGHT: 315 LBS | HEART RATE: 94 BPM | RESPIRATION RATE: 96 BRPM | SYSTOLIC BLOOD PRESSURE: 118 MMHG

## 2024-01-02 DIAGNOSIS — Z87.891 PERSONAL HISTORY OF TOBACCO USE: ICD-10-CM

## 2024-01-02 DIAGNOSIS — Z23 IMMUNIZATION DUE: ICD-10-CM

## 2024-01-02 DIAGNOSIS — Z00.00 MEDICARE ANNUAL WELLNESS VISIT, SUBSEQUENT: Primary | ICD-10-CM

## 2024-01-02 PROCEDURE — G0439 PPPS, SUBSEQ VISIT: HCPCS | Performed by: NURSE PRACTITIONER

## 2024-01-02 PROCEDURE — 90674 CCIIV4 VAC NO PRSV 0.5 ML IM: CPT | Performed by: NURSE PRACTITIONER

## 2024-01-02 PROCEDURE — G0008 ADMIN INFLUENZA VIRUS VAC: HCPCS | Performed by: NURSE PRACTITIONER

## 2024-01-02 PROCEDURE — G0296 VISIT TO DETERM LDCT ELIG: HCPCS | Performed by: NURSE PRACTITIONER

## 2024-01-02 PROCEDURE — G8484 FLU IMMUNIZE NO ADMIN: HCPCS | Performed by: NURSE PRACTITIONER

## 2024-01-02 PROCEDURE — 3017F COLORECTAL CA SCREEN DOC REV: CPT | Performed by: NURSE PRACTITIONER

## 2024-01-02 ASSESSMENT — PATIENT HEALTH QUESTIONNAIRE - PHQ9
SUM OF ALL RESPONSES TO PHQ9 QUESTIONS 1 & 2: 0
SUM OF ALL RESPONSES TO PHQ QUESTIONS 1-9: 0
1. LITTLE INTEREST OR PLEASURE IN DOING THINGS: 0
SUM OF ALL RESPONSES TO PHQ QUESTIONS 1-9: 0
2. FEELING DOWN, DEPRESSED OR HOPELESS: 0
SUM OF ALL RESPONSES TO PHQ QUESTIONS 1-9: 0
SUM OF ALL RESPONSES TO PHQ QUESTIONS 1-9: 0

## 2024-01-02 ASSESSMENT — LIFESTYLE VARIABLES
HOW OFTEN DO YOU HAVE A DRINK CONTAINING ALCOHOL: NEVER
HOW MANY STANDARD DRINKS CONTAINING ALCOHOL DO YOU HAVE ON A TYPICAL DAY: PATIENT DOES NOT DRINK

## 2024-01-08 ENCOUNTER — TELEPHONE (OUTPATIENT)
Dept: FAMILY MEDICINE CLINIC | Age: 49
End: 2024-01-08

## 2024-01-08 NOTE — TELEPHONE ENCOUNTER
Patient called to see if he need the second pneumococcal he received the pneumovax 5/25/22 would he just need the one dose of Prevnar 20 or would he need both series of Prevnar

## 2024-02-01 ENCOUNTER — TELEPHONE (OUTPATIENT)
Dept: ONCOLOGY | Age: 49
End: 2024-02-01

## 2024-02-08 ENCOUNTER — HOSPITAL ENCOUNTER (OUTPATIENT)
Dept: CT IMAGING | Age: 49
Discharge: HOME | End: 2024-02-10
Payer: COMMERCIAL

## 2024-02-08 DIAGNOSIS — Z87.891 PERSONAL HISTORY OF TOBACCO USE: ICD-10-CM

## 2024-02-08 PROCEDURE — 71271 CT THORAX LUNG CANCER SCR C-: CPT

## 2024-02-12 ENCOUNTER — TELEPHONE (OUTPATIENT)
Dept: FAMILY MEDICINE CLINIC | Age: 49
End: 2024-02-12

## 2024-02-12 DIAGNOSIS — F17.200 SMOKER: Primary | ICD-10-CM

## 2024-02-12 DIAGNOSIS — I25.10 CORONARY ARTERY DISEASE INVOLVING NATIVE HEART WITHOUT ANGINA PECTORIS, UNSPECIFIED VESSEL OR LESION TYPE: Primary | ICD-10-CM

## 2024-02-12 NOTE — TELEPHONE ENCOUNTER
Patient would like nicotine patches to help stop smoking. [facility] care pharmacy in [place]

## 2024-02-13 ENCOUNTER — NURSE ONLY (OUTPATIENT)
Dept: FAMILY MEDICINE CLINIC | Age: 49
End: 2024-02-13
Payer: COMMERCIAL

## 2024-02-13 DIAGNOSIS — Z23 IMMUNIZATION DUE: Primary | ICD-10-CM

## 2024-02-13 PROCEDURE — 90677 PCV20 VACCINE IM: CPT | Performed by: NURSE PRACTITIONER

## 2024-02-13 PROCEDURE — 99999 PR OFFICE/OUTPT VISIT,PROCEDURE ONLY: CPT | Performed by: NURSE PRACTITIONER

## 2024-02-13 PROCEDURE — 90473 IMMUNE ADMIN ORAL/NASAL: CPT | Performed by: NURSE PRACTITIONER

## 2024-02-13 PROCEDURE — G0009 ADMIN PNEUMOCOCCAL VACCINE: HCPCS | Performed by: NURSE PRACTITIONER

## 2024-02-13 RX ORDER — NICOTINE 21 MG/24HR
1 PATCH, TRANSDERMAL 24 HOURS TRANSDERMAL DAILY
Qty: 42 PATCH | Refills: 0 | Status: SHIPPED | OUTPATIENT
Start: 2024-02-13 | End: 2024-03-26

## 2024-02-13 NOTE — PROGRESS NOTES
Patient here for Prevnar 20 per order from Evita Mirza. Immunization given, patient tolerated injection well

## 2024-02-28 ENCOUNTER — OFFICE VISIT (OUTPATIENT)
Dept: PODIATRY | Age: 49
End: 2024-02-28
Payer: COMMERCIAL

## 2024-02-28 VITALS — BODY MASS INDEX: 40.43 KG/M2 | WEIGHT: 315 LBS | HEIGHT: 74 IN

## 2024-02-28 DIAGNOSIS — M77.50 TENDONITIS OF ANKLE OR FOOT: ICD-10-CM

## 2024-02-28 DIAGNOSIS — R60.0 EDEMA OF LOWER EXTREMITY: ICD-10-CM

## 2024-02-28 DIAGNOSIS — R26.2 DIFFICULTY WALKING: ICD-10-CM

## 2024-02-28 DIAGNOSIS — L60.0 INGROWN NAIL: ICD-10-CM

## 2024-02-28 DIAGNOSIS — B35.1 DERMATOPHYTOSIS OF NAIL: Primary | ICD-10-CM

## 2024-02-28 DIAGNOSIS — M79.604 BILATERAL LOWER EXTREMITY PAIN: ICD-10-CM

## 2024-02-28 DIAGNOSIS — M79.605 BILATERAL LOWER EXTREMITY PAIN: ICD-10-CM

## 2024-02-28 PROCEDURE — G8482 FLU IMMUNIZE ORDER/ADMIN: HCPCS | Performed by: PODIATRIST

## 2024-02-28 PROCEDURE — 4004F PT TOBACCO SCREEN RCVD TLK: CPT | Performed by: PODIATRIST

## 2024-02-28 PROCEDURE — G8417 CALC BMI ABV UP PARAM F/U: HCPCS | Performed by: PODIATRIST

## 2024-02-28 PROCEDURE — 99213 OFFICE O/P EST LOW 20 MIN: CPT | Performed by: PODIATRIST

## 2024-02-28 PROCEDURE — 11721 DEBRIDE NAIL 6 OR MORE: CPT | Performed by: PODIATRIST

## 2024-02-28 PROCEDURE — G8427 DOCREV CUR MEDS BY ELIG CLIN: HCPCS | Performed by: PODIATRIST

## 2024-02-28 PROCEDURE — 3017F COLORECTAL CA SCREEN DOC REV: CPT | Performed by: PODIATRIST

## 2024-02-28 RX ORDER — LEVOFLOXACIN 750 MG/1
TABLET, FILM COATED ORAL
COMMUNITY
Start: 2024-02-23

## 2024-02-28 RX ORDER — PREDNISONE 20 MG/1
TABLET ORAL
COMMUNITY
Start: 2024-02-23

## 2024-02-28 RX ORDER — DEXTROMETHORPHAN HYDROBROMIDE, GUAIFENESIN AND PSEUDOEPHEDRINE HYDROCHLORIDE 15; 400; 60 MG/1; MG/1; MG/1
TABLET ORAL
COMMUNITY
Start: 2024-02-23

## 2024-02-28 RX ORDER — ALBUTEROL SULFATE 90 UG/1
AEROSOL, METERED RESPIRATORY (INHALATION)
COMMUNITY
Start: 2024-02-23

## 2024-02-28 RX ORDER — FUROSEMIDE 20 MG/1
20 TABLET ORAL DAILY
Qty: 14 TABLET | Refills: 0 | Status: SHIPPED | OUTPATIENT
Start: 2024-02-28

## 2024-02-28 RX ORDER — MELOXICAM 7.5 MG/1
7.5 TABLET ORAL 2 TIMES DAILY
Qty: 30 TABLET | Refills: 1 | Status: SHIPPED | OUTPATIENT
Start: 2024-02-28

## 2024-02-28 NOTE — PROGRESS NOTES
[facility] PODIATRY [address]  [address]  [place] [id number]  Dept: [phone]  Dept Fax: [phone]     PAIN PROGRESS NOTE  Date of patient's visit: 2/28/2024  Patient's Name:  Jonny Westbrook YOB: 1975            Patient Care Team:  Evita Mirza APRN - CNP as PCP - General (Nurse Practitioner)  Evita Mirza APRN - CNP as PCP - Empaneled Provider  Georgiana Fagan DPM as Physician (Podiatry)  Tramaine Mckeon MD as Consulting Physician (Urology)      Chief Complaint   Patient presents with    Foot Pain     Bilateral feet     Nail Problem     Toenail trim     Swelling     Bilateral feet x 1 year        Subjective:   This Jonny Westbrook comes to clinic for foot and nail care.  Pt currently has complaint of thickened, painful, elongated nails that he/she cannot manage by themselves.  Pt. Relates pain to nails with shoe gear.  Pt's primary care physician is Evita Mirza APRN - CNP last seen 1/2/24.    He also c/o of tendonitis pain of michael ankles with increased swelling.    Past Medical History:   Diagnosis Date    Arthritis     Caffeine use     2-3 cups coffee and 2-3 cups tea/day    Depression     Hypertriglyceridemia     Obesity     Schizophrenia (HCC)        Allergies   Allergen Reactions    Codeine Other (See Comments)     hallucinates      Current Outpatient Medications on File Prior to Visit   Medication Sig Dispense Refill    albuterol sulfate HFA (PROVENTIL;VENTOLIN;PROAIR) 108 (90 Base) MCG/ACT inhaler       levoFLOXacin (LEVAQUIN) 750 MG tablet       predniSONE (DELTASONE) 20 MG tablet       CAPMIST DM 60- MG TABS       nicotine (NICODERM CQ) 21 MG/24HR Place 1 patch onto the skin daily 42 patch 0    testosterone (ANDROGEL) 40.5 MG/2.5GM (1.62%) GEL packet Apply 5 g topically daily for 180 days. Max Daily Amount: 5 g 150 g 5    vitamin C (ASCORBIC ACID) 500 MG tablet TAKE 2 TABS BY MOUTH ONCE EVERY DAY 60

## 2024-04-19 DIAGNOSIS — Z78.9 TAKES DAILY MULTIVITAMINS: ICD-10-CM

## 2024-04-19 RX ORDER — ASCORBIC ACID 500 MG
TABLET ORAL
Qty: 60 TABLET | Refills: 5 | Status: SHIPPED | OUTPATIENT
Start: 2024-04-19

## 2024-04-19 RX ORDER — MULTIVITAMIN WITH FOLIC ACID 400 MCG
TABLET ORAL
Qty: 30 TABLET | Refills: 5 | Status: SHIPPED | OUTPATIENT
Start: 2024-04-19

## 2024-04-19 NOTE — TELEPHONE ENCOUNTER
Jonny Westbrook is calling to request a refill on the following medication(s):    Medication Request:  Requested Prescriptions     Pending Prescriptions Disp Refills    vitamin C (ASCORBIC ACID) 500 MG tablet [Pharmacy Med Name: VITAMIN C 500 MG TABLET] 60 tablet 5     Sig: TAKE 2 TABS BY MOUTH ONCE EVERY DAY    Multiple Vitamin (MULTIVITAMIN) tablet [Pharmacy Med Name: TAB-A-INGE TABLET] 30 tablet 5     Sig: TAKE 1 TAB BY MOUTH ONCE EVERY DAY       Last Visit Date (If Applicable):  1/2/2024    Next Visit Date:    10/4/2024

## 2024-06-10 ENCOUNTER — OFFICE VISIT (OUTPATIENT)
Age: 49
End: 2024-06-10
Payer: COMMERCIAL

## 2024-06-10 DIAGNOSIS — E29.1 HYPOGONADISM IN MALE: Primary | ICD-10-CM

## 2024-06-10 DIAGNOSIS — R35.1 NOCTURIA: ICD-10-CM

## 2024-06-10 DIAGNOSIS — N40.1 BPH WITH OBSTRUCTION/LOWER URINARY TRACT SYMPTOMS: ICD-10-CM

## 2024-06-10 DIAGNOSIS — N13.8 BPH WITH OBSTRUCTION/LOWER URINARY TRACT SYMPTOMS: ICD-10-CM

## 2024-06-10 PROBLEM — N40.0 BPH WITHOUT URINARY OBSTRUCTION: Status: ACTIVE | Noted: 2024-06-10

## 2024-06-10 LAB
BILIRUBIN, POC: NORMAL
BLOOD URINE, POC: NORMAL
CLARITY, POC: CLEAR
COLOR, POC: YELLOW
GLUCOSE URINE, POC: NORMAL
KETONES, POC: NORMAL
LEUKOCYTE EST, POC: NORMAL
NITRITE, POC: NORMAL
PH, POC: NORMAL
PROTEIN, POC: NORMAL
SPECIFIC GRAVITY, POC: NORMAL
UROBILINOGEN, POC: NORMAL

## 2024-06-10 PROCEDURE — 4004F PT TOBACCO SCREEN RCVD TLK: CPT | Performed by: SPECIALIST

## 2024-06-10 PROCEDURE — 81003 URINALYSIS AUTO W/O SCOPE: CPT | Performed by: SPECIALIST

## 2024-06-10 PROCEDURE — G8427 DOCREV CUR MEDS BY ELIG CLIN: HCPCS | Performed by: SPECIALIST

## 2024-06-10 PROCEDURE — 3017F COLORECTAL CA SCREEN DOC REV: CPT | Performed by: SPECIALIST

## 2024-06-10 PROCEDURE — 99214 OFFICE O/P EST MOD 30 MIN: CPT | Performed by: SPECIALIST

## 2024-06-10 PROCEDURE — G8417 CALC BMI ABV UP PARAM F/U: HCPCS | Performed by: SPECIALIST

## 2024-06-10 RX ORDER — TESTOSTERONE UNDECANOATE 237 MG/1
237 CAPSULE, LIQUID FILLED ORAL 2 TIMES DAILY
Qty: 60 CAPSULE | Refills: 5 | Status: SHIPPED | OUTPATIENT
Start: 2024-06-10

## 2024-06-10 RX ORDER — ARIPIPRAZOLE 2 MG/1
TABLET ORAL
COMMUNITY
Start: 2024-06-06

## 2024-06-10 NOTE — PROGRESS NOTES
Tramaine Mckeon MD [facility] Urology Office Progress Note    Patient:  Jonny Westbrook  YOB: 1975  Date: 6/10/2024    HISTORY OF PRESENT ILLNESS:   The patient is a 48 y.o. male  Patient was using Androgel 1.62% 2 pumps topically qd for hypogonadism but stopped because it was too costly.  Will Rx Jatenzo 237 mg po bid with food since this may be covered 100% depending on his insurance plan.  He does have some lower urinary tract symptoms due to BPH but these are not bothersome enough to warrant medical Rx.     Patient instructed to avoid bladder irritants in diet such as coffee, tea, caffeine, alcohol, carbonated beverages, spicy/acidic foods.   Lower urinary tract symptoms: nocturia, 2 times per night   Last AUA Symptom Score (QOL): 2 (1)  Today's AUA Symptom Score (QOL): 2 (6)    Summary of old records:   Recurrent UTI: 10/22/21 Citrobacter, 9/24/23 E coli; PVR = 35 mL; 7/18/23 renal and bladder US neg, 8/2/23 uroflow: 480 mL, 24.2/16 mL/sec; declined a cystoscopy  Excessive bladder irritant consumption: 2-3 coffee/d, 2-3 tea/d, 6 pops/d; now only 2 coffee/d  Microhematuria: 9/24/23 CT wo contrast neg; 9/29/23 CT urogram neg  Hypogonadism: free and total testosterone=37/241: Androgel 1.62% 2 pumps topically qd (too expensive); Rx for Jatenzo 237 mg po bid with food. 6/10/24    Additional History: none    Procedures Today: N/A    Urinalysis today:  Results for POC orders placed in visit on 06/10/24   POCT Urinalysis No Micro (Auto)   Result Value Ref Range    Color, UA yellow     Clarity, UA clear     Glucose, UA POC neg     Bilirubin, UA      Ketones, UA      Spec Grav, UA      Blood, UA POC trace-intact     pH, UA      Protein, UA POC neg     Urobilinogen, UA      Leukocytes, UA neg     Nitrite, UA neg        Last several PSA's:  No results found for: \"PSA\"    Last total testosterone:  Lab Results   Component Value Date    TESTOSTERONE 241 11/13/2023       Last BUN and

## 2024-06-12 ENCOUNTER — HOSPITAL ENCOUNTER (OUTPATIENT)
Age: 49
Discharge: HOME | End: 2024-06-12
Payer: COMMERCIAL

## 2024-06-12 LAB
ALBUMIN SERPL-MCNC: 4.3 G/DL (ref 3.5–5.2)
ALBUMIN/GLOB SERPL: 1 {RATIO} (ref 1–2.5)
ALP SERPL-CCNC: 84 U/L (ref 40–129)
ALT SERPL-CCNC: 17 U/L (ref 10–50)
ANION GAP SERPL CALCULATED.3IONS-SCNC: 8 MMOL/L (ref 9–16)
AST SERPL-CCNC: 25 U/L (ref 10–50)
BASOPHILS # BLD: 0.05 K/UL (ref 0–0.2)
BASOPHILS NFR BLD: 1 % (ref 0–2)
BILIRUB SERPL-MCNC: 0.5 MG/DL (ref 0–1.2)
BUN SERPL-MCNC: 9 MG/DL (ref 6–20)
CALCIUM SERPL-MCNC: 9.4 MG/DL (ref 8.6–10.4)
CHLORIDE SERPL-SCNC: 102 MMOL/L (ref 98–107)
CHOLEST SERPL-MCNC: 146 MG/DL (ref 0–199)
CHOLESTEROL/HDL RATIO: 4
CO2 SERPL-SCNC: 29 MMOL/L (ref 20–31)
CREAT SERPL-MCNC: 0.9 MG/DL (ref 0.7–1.2)
EOSINOPHIL # BLD: 0.19 K/UL (ref 0–0.44)
EOSINOPHILS RELATIVE PERCENT: 2 % (ref 1–4)
ERYTHROCYTE [DISTWIDTH] IN BLOOD BY AUTOMATED COUNT: 12.8 % (ref 11.8–14.4)
EST. AVERAGE GLUCOSE BLD GHB EST-MCNC: 111 MG/DL
GFR, ESTIMATED: >90 ML/MIN/1.73M2
GLUCOSE SERPL-MCNC: 87 MG/DL (ref 74–99)
HBA1C MFR BLD: 5.5 % (ref 4–6)
HCT VFR BLD AUTO: 46.3 % (ref 40.7–50.3)
HDLC SERPL-MCNC: 34 MG/DL
HGB BLD-MCNC: 15.1 G/DL (ref 13–17)
IMM GRANULOCYTES # BLD AUTO: 0.04 K/UL (ref 0–0.3)
IMM GRANULOCYTES NFR BLD: 1 %
LDLC SERPL CALC-MCNC: 88 MG/DL (ref 0–100)
LYMPHOCYTES NFR BLD: 2.94 K/UL (ref 1.1–3.7)
LYMPHOCYTES RELATIVE PERCENT: 36 % (ref 24–43)
MCH RBC QN AUTO: 29.5 PG (ref 25.2–33.5)
MCHC RBC AUTO-ENTMCNC: 32.6 G/DL (ref 28.4–34.8)
MCV RBC AUTO: 90.6 FL (ref 82.6–102.9)
MONOCYTES NFR BLD: 0.59 K/UL (ref 0.1–1.2)
MONOCYTES NFR BLD: 7 % (ref 3–12)
NEUTROPHILS NFR BLD: 53 % (ref 36–65)
NEUTS SEG NFR BLD: 4.45 K/UL (ref 1.5–8.1)
NRBC BLD-RTO: 0 PER 100 WBC
PLATELET # BLD AUTO: 184 K/UL (ref 138–453)
PMV BLD AUTO: 11.1 FL (ref 8.1–13.5)
POTASSIUM SERPL-SCNC: 4.3 MMOL/L (ref 3.7–5.3)
PROT SERPL-MCNC: 7.2 G/DL (ref 6.6–8.7)
PSA SERPL-MCNC: 0.3 NG/ML (ref 0–4)
RBC # BLD AUTO: 5.11 M/UL (ref 4.21–5.77)
SODIUM SERPL-SCNC: 139 MMOL/L (ref 136–145)
TRIGL SERPL-MCNC: 120 MG/DL
TSH SERPL DL<=0.05 MIU/L-ACNC: 1.59 UIU/ML (ref 0.27–4.2)
VLDLC SERPL CALC-MCNC: 24 MG/DL
WBC OTHER # BLD: 8.3 K/UL (ref 3.5–11.3)

## 2024-06-12 PROCEDURE — 83036 HEMOGLOBIN GLYCOSYLATED A1C: CPT

## 2024-06-12 PROCEDURE — 84443 ASSAY THYROID STIM HORMONE: CPT

## 2024-06-12 PROCEDURE — 84153 ASSAY OF PSA TOTAL: CPT

## 2024-06-12 PROCEDURE — 80061 LIPID PANEL: CPT

## 2024-06-12 PROCEDURE — 36415 COLL VENOUS BLD VENIPUNCTURE: CPT

## 2024-06-12 PROCEDURE — 80053 COMPREHEN METABOLIC PANEL: CPT

## 2024-06-12 PROCEDURE — 85025 COMPLETE CBC W/AUTO DIFF WBC: CPT

## 2024-06-19 ENCOUNTER — TELEPHONE (OUTPATIENT)
Age: 49
End: 2024-06-19

## 2024-06-19 RX ORDER — ALFUZOSIN HYDROCHLORIDE 10 MG/1
10 TABLET, EXTENDED RELEASE ORAL DAILY
Qty: 30 TABLET | Refills: 5 | Status: SHIPPED | OUTPATIENT
Start: 2024-06-19

## 2024-06-19 NOTE — TELEPHONE ENCOUNTER
See Rx for Alfuzosin 10 mg po qd for BPH symptoms.   Patient instructed to avoid bladder irritants in diet such as coffee, tea, caffeine, alcohol, carbonated beverages, spicy/acidic foods.

## 2024-06-19 NOTE — TELEPHONE ENCOUNTER
Patient called in asking about a medication that he said was discussed at last office visit. Said he was told he would be given something for his symptoms but nothing was called in or on his AVS. Nothing in visit note about this. States he goes to the bathroom and thinks he is done but then he has to go an additional 20-30 seconds.

## 2024-06-30 ENCOUNTER — APPOINTMENT (OUTPATIENT)
Dept: GENERAL RADIOLOGY | Age: 49
End: 2024-06-30
Payer: COMMERCIAL

## 2024-06-30 ENCOUNTER — HOSPITAL ENCOUNTER (EMERGENCY)
Age: 49
Discharge: HOME | End: 2024-06-30
Attending: EMERGENCY MEDICINE
Payer: COMMERCIAL

## 2024-06-30 VITALS
HEART RATE: 87 BPM | TEMPERATURE: 98.1 F | SYSTOLIC BLOOD PRESSURE: 135 MMHG | BODY MASS INDEX: 44.3 KG/M2 | WEIGHT: 315 LBS | OXYGEN SATURATION: 97 % | RESPIRATION RATE: 16 BRPM | DIASTOLIC BLOOD PRESSURE: 94 MMHG

## 2024-06-30 DIAGNOSIS — J06.9 VIRAL URI WITH COUGH: Primary | ICD-10-CM

## 2024-06-30 LAB
FLUAV RNA RESP QL NAA+PROBE: NOT DETECTED
FLUBV RNA RESP QL NAA+PROBE: NOT DETECTED
SARS-COV-2 RNA RESP QL NAA+PROBE: NOT DETECTED
SOURCE: NORMAL
SPECIMEN DESCRIPTION: NORMAL

## 2024-06-30 PROCEDURE — 87636 SARSCOV2 & INF A&B AMP PRB: CPT

## 2024-06-30 PROCEDURE — 99284 EMERGENCY DEPT VISIT MOD MDM: CPT

## 2024-06-30 PROCEDURE — 71045 X-RAY EXAM CHEST 1 VIEW: CPT

## 2024-06-30 RX ORDER — BENZONATATE 100 MG/1
100 CAPSULE ORAL 3 TIMES DAILY PRN
Qty: 30 CAPSULE | Refills: 0 | Status: SHIPPED | OUTPATIENT
Start: 2024-06-30 | End: 2024-07-10

## 2024-06-30 ASSESSMENT — PAIN - FUNCTIONAL ASSESSMENT: PAIN_FUNCTIONAL_ASSESSMENT: NONE - DENIES PAIN

## 2024-06-30 NOTE — ED PROVIDER NOTES
EMERGENCY DEPARTMENT ENCOUNTER    Pt Name: Jonny Westbrook  MRN: [id number]  Birthdate 1975  Date of evaluation: 6/30/24  CHIEF COMPLAINT       Chief Complaint   Patient presents with    Cough     A few days     Rash     LT lower legs a few days     HISTORY OF PRESENT ILLNESS   HPI   The history is provided by the patient and medical records.  The patient is a 48-year-old male who presents to the ED for runny nose, nasal congestion and cough.  Symptoms started 2 weeks ago, the same day he left on the train to visit his father in [place].  Also reports pruritic, dry skin to his lower legs.  He has been using hydrocortisone cream with minimal improved.    REVIEW OF SYSTEMS     Review of Systems  All other systems reviewed and are negative.    PASTMEDICAL HISTORY     Past Medical History:   Diagnosis Date    Arthritis     Caffeine use     2-3 cups coffee and 2-3 cups tea/day    Depression     Hypertriglyceridemia     Obesity     Schizophrenia ([facility])      Past Problem List  Patient Active Problem List   Diagnosis Code    Morbid obesity due to excess calories ([facility]) E66.01    Homicidal ideation R45.850    Schizophrenia ([facility]) F20.9    Smoker unmotivated to quit F17.200    IFG (impaired fasting glucose) R73.01    Recurrent UTI (urinary tract infection) N39.0    Nocturia R35.1    Frequency of urination R35.0    Microhematuria R31.29    Hypogonadism in male E29.1    BPH without urinary obstruction N40.0     SURGICAL HISTORY       Past Surgical History:   Procedure Laterality Date    TONSILLECTOMY       CURRENT MEDICATIONS       Previous Medications    ALBUTEROL SULFATE HFA (PROVENTIL;VENTOLIN;PROAIR) 108 (90 BASE) MCG/ACT INHALER        ALFUZOSIN (UROXATRAL) 10 MG EXTENDED RELEASE TABLET    Take 1 tablet by mouth daily    ARIPIPRAZOLE (ABILIFY) 2 MG TABLET        ARIPIPRAZOLE (ABILIFY) 5 MG TABLET    Take 1 tablet by mouth daily    CAPMIST DM 60- MG TABS        FUROSEMIDE (LASIX) 20 MG TABLET    Take 1 tablet by mouth

## 2024-06-30 NOTE — DISCHARGE INSTRUCTIONS
Stop smoking cigarettes.  I know you can do it.    Apply a barrier cream like Vaseline, or Lubriderm, to your lower legs.    Return to this emergency room immediately if your symptoms persist, worsen or if new ones form.    Make sure you follow-up with your primary care doctor within the next 1-2 business days.

## 2024-07-08 ENCOUNTER — OFFICE VISIT (OUTPATIENT)
Dept: FAMILY MEDICINE CLINIC | Age: 49
End: 2024-07-08
Payer: COMMERCIAL

## 2024-07-08 VITALS
DIASTOLIC BLOOD PRESSURE: 62 MMHG | TEMPERATURE: 97.2 F | HEART RATE: 92 BPM | WEIGHT: 315 LBS | SYSTOLIC BLOOD PRESSURE: 120 MMHG | BODY MASS INDEX: 49.05 KG/M2 | OXYGEN SATURATION: 94 %

## 2024-07-08 DIAGNOSIS — J06.9 VIRAL URI: ICD-10-CM

## 2024-07-08 DIAGNOSIS — L03.116 CELLULITIS OF LEFT LOWER EXTREMITY: ICD-10-CM

## 2024-07-08 DIAGNOSIS — E63.9 NUTRITIONAL DEFICIENCY: ICD-10-CM

## 2024-07-08 DIAGNOSIS — Z09 HOSPITAL DISCHARGE FOLLOW-UP: Primary | ICD-10-CM

## 2024-07-08 PROCEDURE — G8417 CALC BMI ABV UP PARAM F/U: HCPCS | Performed by: NURSE PRACTITIONER

## 2024-07-08 PROCEDURE — 4004F PT TOBACCO SCREEN RCVD TLK: CPT | Performed by: NURSE PRACTITIONER

## 2024-07-08 PROCEDURE — 99214 OFFICE O/P EST MOD 30 MIN: CPT | Performed by: NURSE PRACTITIONER

## 2024-07-08 PROCEDURE — G8427 DOCREV CUR MEDS BY ELIG CLIN: HCPCS | Performed by: NURSE PRACTITIONER

## 2024-07-08 PROCEDURE — 3017F COLORECTAL CA SCREEN DOC REV: CPT | Performed by: NURSE PRACTITIONER

## 2024-07-08 RX ORDER — LACTOSE-REDUCED FOOD
1 LIQUID (ML) ORAL DAILY
Qty: 30 EACH | Refills: 1 | Status: SHIPPED | OUTPATIENT
Start: 2024-07-08

## 2024-07-08 RX ORDER — DOXYCYCLINE HYCLATE 100 MG
100 TABLET ORAL 2 TIMES DAILY
Qty: 14 TABLET | Refills: 0 | Status: SHIPPED | OUTPATIENT
Start: 2024-07-08 | End: 2024-07-15

## 2024-07-08 SDOH — ECONOMIC STABILITY: FOOD INSECURITY: WITHIN THE PAST 12 MONTHS, THE FOOD YOU BOUGHT JUST DIDN'T LAST AND YOU DIDN'T HAVE MONEY TO GET MORE.: NEVER TRUE

## 2024-07-08 SDOH — ECONOMIC STABILITY: FOOD INSECURITY: WITHIN THE PAST 12 MONTHS, YOU WORRIED THAT YOUR FOOD WOULD RUN OUT BEFORE YOU GOT MONEY TO BUY MORE.: OFTEN TRUE

## 2024-07-08 SDOH — ECONOMIC STABILITY: INCOME INSECURITY: HOW HARD IS IT FOR YOU TO PAY FOR THE VERY BASICS LIKE FOOD, HOUSING, MEDICAL CARE, AND HEATING?: NOT HARD AT ALL

## 2024-07-08 NOTE — PATIENT INSTRUCTIONS
[place], [place], or [facility]. Additional food resources and help for any health or human need.  Phone and Address: 2-1-1 or [phone] ([phone])     [employer] [place] ([facility])  What they offer: Hot meal first, third, and fourth Saturday 6pm - 8pm  Phone Number: [phone]  [facility] [employer] on Aging, [facility] 5:   [place], [place], [place], [place], [place], [place], [place], [place], [facility]:  What they offer: Referral to transportation and other resources for seniors.  Phone Number: [phone]   [employer]  What they offer: After School Meals Program providing meals to children.  Phone Number: [phone]  [facility]:  What they offer: Food pantry for [facility] residents  Phone Number: [phone]  [facility]:  What they offer: Food pantry for [facility] Residents  Phone Number: [phone]  [employer]  Phone number: [phone]              [facility] [employer] and [facility]  Phone number: [phone]

## 2024-07-08 NOTE — PROGRESS NOTES
Evita Mirza, [facility]  [address]  [place]  [address]  [phone]  [phone]    Jonny Westbrook is a 48 y.o. male who is here with c/o of:    Chief Complaint: Follow-Up from Hospital      Patient Accompanied by: n/a    HPI - Jonny Westbrook is here today with c/o:    Patient here for ED follow up from [facility]. He went to ED with cold like symptoms. Was told he had URI. He was given Tessalon Pearls. Says his symptoms have now resolved. He also had rash on legs and has been putting vasoline on it. He says it continues to ooze purulent drainage and is itchy. He does have some discomfort. Denies fevers or chills.       Health Maintenance Due   Topic Date Due    Hepatitis B vaccine (3 of 3 - Hep B Twinrix 3-dose series) 07/08/2024        Patient Active Problem List:     Morbid obesity due to excess calories (HCC)     Homicidal ideation     Schizophrenia (HCC)     Smoker unmotivated to quit     IFG (impaired fasting glucose)     Recurrent UTI (urinary tract infection)     Nocturia     Frequency of urination     Microhematuria     Hypogonadism in male     BPH without urinary obstruction     Past Medical History:   Diagnosis Date    Arthritis     Caffeine use     2-3 cups coffee and 2-3 cups tea/day    Depression     Hypertriglyceridemia     Obesity     Schizophrenia (HCC)       Past Surgical History:   Procedure Laterality Date    TONSILLECTOMY       Family History   Problem Relation Age of Onset    Cancer Mother         breast    Other Father         COPD    Cancer Father         prostate    Diabetes Sister      Social History     Tobacco Use    Smoking status: Every Day     Current packs/day: 1.00     Average packs/day: 1 pack/day for 32.5 years (32.5 ttl pk-yrs)     Types: Cigarettes     Start date: 1/1/1992    Smokeless tobacco: Never   Substance Use Topics    Alcohol use: No     ALLERGIES:    Allergies   Allergen Reactions    Codeine Other (See Comments)     hallucinates

## 2024-07-15 ENCOUNTER — OFFICE VISIT (OUTPATIENT)
Dept: FAMILY MEDICINE CLINIC | Age: 49
End: 2024-07-15
Payer: COMMERCIAL

## 2024-07-15 VITALS
BODY MASS INDEX: 46.99 KG/M2 | WEIGHT: 315 LBS | TEMPERATURE: 97.2 F | DIASTOLIC BLOOD PRESSURE: 60 MMHG | OXYGEN SATURATION: 90 % | SYSTOLIC BLOOD PRESSURE: 120 MMHG | HEART RATE: 95 BPM

## 2024-07-15 DIAGNOSIS — L03.116 CELLULITIS OF LEFT LOWER EXTREMITY: ICD-10-CM

## 2024-07-15 DIAGNOSIS — Z09 FOLLOW-UP EXAM AFTER TREATMENT: Primary | ICD-10-CM

## 2024-07-15 PROCEDURE — G8417 CALC BMI ABV UP PARAM F/U: HCPCS | Performed by: NURSE PRACTITIONER

## 2024-07-15 PROCEDURE — 3017F COLORECTAL CA SCREEN DOC REV: CPT | Performed by: NURSE PRACTITIONER

## 2024-07-15 PROCEDURE — G8427 DOCREV CUR MEDS BY ELIG CLIN: HCPCS | Performed by: NURSE PRACTITIONER

## 2024-07-15 PROCEDURE — 4004F PT TOBACCO SCREEN RCVD TLK: CPT | Performed by: NURSE PRACTITIONER

## 2024-07-15 PROCEDURE — 99214 OFFICE O/P EST MOD 30 MIN: CPT | Performed by: NURSE PRACTITIONER

## 2024-07-15 RX ORDER — CEPHALEXIN 500 MG/1
500 CAPSULE ORAL 4 TIMES DAILY
Qty: 28 CAPSULE | Refills: 0 | Status: SHIPPED | OUTPATIENT
Start: 2024-07-15 | End: 2024-07-22

## 2024-07-15 ASSESSMENT — ENCOUNTER SYMPTOMS: COLOR CHANGE: 1

## 2024-07-15 ASSESSMENT — PATIENT HEALTH QUESTIONNAIRE - PHQ9
1. LITTLE INTEREST OR PLEASURE IN DOING THINGS: NOT AT ALL
SUM OF ALL RESPONSES TO PHQ QUESTIONS 1-9: 0
2. FEELING DOWN, DEPRESSED OR HOPELESS: NOT AT ALL
SUM OF ALL RESPONSES TO PHQ QUESTIONS 1-9: 0
SUM OF ALL RESPONSES TO PHQ9 QUESTIONS 1 & 2: 0

## 2024-07-15 NOTE — PROGRESS NOTES
Evita Mirza, [facility]  [address]  [place]  [address]  [phone]  [phone]    Jonny Westbrook is a 48 y.o. male who is here with c/o of:    Chief Complaint: Other (Recheck on legs  per pt)      Patient Accompanied by: n/a    HPI - Jonny Westrbook is here today with c/o:    Patient here for follow up cellulitis. Has 1 more day of antibiotics. Reports he is feeling better. Still having some purulent drainage from left calf but has improved. Denies fevers or chills.       Health Maintenance Due   Topic Date Due    Hepatitis B vaccine (3 of 3 - Hep B Twinrix 3-dose series) 07/08/2024        Patient Active Problem List:     Morbid obesity due to excess calories (HCC)     Homicidal ideation     Schizophrenia (HCC)     Smoker unmotivated to quit     IFG (impaired fasting glucose)     Recurrent UTI (urinary tract infection)     Nocturia     Frequency of urination     Microhematuria     Hypogonadism in male     BPH without urinary obstruction     Past Medical History:   Diagnosis Date    Arthritis     Caffeine use     2-3 cups coffee and 2-3 cups tea/day    Depression     Hypertriglyceridemia     Obesity     Schizophrenia (HCC)       Past Surgical History:   Procedure Laterality Date    TONSILLECTOMY       Family History   Problem Relation Age of Onset    Cancer Mother         breast    Other Father         COPD    Cancer Father         prostate    Diabetes Sister      Social History     Tobacco Use    Smoking status: Every Day     Current packs/day: 1.00     Average packs/day: 1 pack/day for 32.5 years (32.5 ttl pk-yrs)     Types: Cigarettes     Start date: 1/1/1992    Smokeless tobacco: Never   Substance Use Topics    Alcohol use: No     ALLERGIES:    Allergies   Allergen Reactions    Codeine Other (See Comments)     hallucinates           Subjective   Review of Systems   Cardiovascular:  Positive for leg swelling.   Skin:  Positive for color change and wound.   All other systems

## 2024-07-23 ENCOUNTER — OFFICE VISIT (OUTPATIENT)
Dept: FAMILY MEDICINE CLINIC | Age: 49
End: 2024-07-23
Payer: MEDICAID

## 2024-07-23 VITALS
WEIGHT: 315 LBS | BODY MASS INDEX: 47.45 KG/M2 | TEMPERATURE: 97 F | HEART RATE: 91 BPM | DIASTOLIC BLOOD PRESSURE: 64 MMHG | OXYGEN SATURATION: 93 % | SYSTOLIC BLOOD PRESSURE: 102 MMHG

## 2024-07-23 DIAGNOSIS — Z09 FOLLOW-UP EXAM AFTER TREATMENT: Primary | ICD-10-CM

## 2024-07-23 DIAGNOSIS — R60.0 BILATERAL EDEMA OF LOWER EXTREMITY: ICD-10-CM

## 2024-07-23 DIAGNOSIS — L03.116 CELLULITIS OF LEFT LOWER EXTREMITY: ICD-10-CM

## 2024-07-23 DIAGNOSIS — E63.9 NUTRITIONAL DEFICIENCY: ICD-10-CM

## 2024-07-23 PROCEDURE — 4004F PT TOBACCO SCREEN RCVD TLK: CPT | Performed by: NURSE PRACTITIONER

## 2024-07-23 PROCEDURE — 99214 OFFICE O/P EST MOD 30 MIN: CPT | Performed by: NURSE PRACTITIONER

## 2024-07-23 PROCEDURE — G8427 DOCREV CUR MEDS BY ELIG CLIN: HCPCS | Performed by: NURSE PRACTITIONER

## 2024-07-23 PROCEDURE — 3017F COLORECTAL CA SCREEN DOC REV: CPT | Performed by: NURSE PRACTITIONER

## 2024-07-23 PROCEDURE — G8417 CALC BMI ABV UP PARAM F/U: HCPCS | Performed by: NURSE PRACTITIONER

## 2024-07-23 RX ORDER — LACTOSE-REDUCED FOOD
2 LIQUID (ML) ORAL DAILY
Qty: 60 EACH | Refills: 1 | Status: SHIPPED | OUTPATIENT
Start: 2024-07-23

## 2024-07-23 RX ORDER — FUROSEMIDE 20 MG/1
20 TABLET ORAL DAILY
Qty: 5 TABLET | Refills: 0 | Status: SHIPPED | OUTPATIENT
Start: 2024-07-23 | End: 2024-07-28

## 2024-07-27 ENCOUNTER — HOSPITAL ENCOUNTER (EMERGENCY)
Age: 49
Discharge: HOME | End: 2024-07-27
Attending: EMERGENCY MEDICINE
Payer: MEDICAID

## 2024-07-27 VITALS
HEART RATE: 93 BPM | BODY MASS INDEX: 47.45 KG/M2 | DIASTOLIC BLOOD PRESSURE: 78 MMHG | TEMPERATURE: 98.2 F | SYSTOLIC BLOOD PRESSURE: 121 MMHG | RESPIRATION RATE: 18 BRPM | OXYGEN SATURATION: 94 % | HEIGHT: 74 IN

## 2024-07-27 DIAGNOSIS — L98.9 SKIN LESION OF LEFT ARM: Primary | ICD-10-CM

## 2024-07-27 PROCEDURE — 99282 EMERGENCY DEPT VISIT SF MDM: CPT

## 2024-07-27 NOTE — ED PROVIDER NOTES
EMERGENCY DEPARTMENT ENCOUNTER    Pt Name: Jonny Westbrook  MRN: [id number]  Birthdate 1975  Date of evaluation: 7/27/24  CHIEF COMPLAINT       Chief Complaint   Patient presents with    Rash     Pt has lesion located to his left arm. Pt states that dermatologist wanted to remove but pt states he wants to make sure it isn't cancer.      HISTORY OF PRESENT ILLNESS   HPI  48-year-old male with noncontributory medical history as below presents to the ED for evaluation of a skin lesion to his left arm that he has had for over a year.  Patient states that it started off as a small pimple, has turned into a firm white dry outgrowth on his left anterior upper arm.  Patient states that he has an appointment in 4 days to see the dermatologist for this but came to the ED requesting a biopsy so I can tell him whether it was cancer or not.  Patient has no other similar lesions, he feels well and has no systemic signs or symptoms.  He denies trauma, bleeding or any other acute complaints.    REVIEW OF SYSTEMS     Review of Systems   All other systems reviewed and are negative.    PASTMEDICAL HISTORY     Past Medical History:   Diagnosis Date    Arthritis     Caffeine use     2-3 cups coffee and 2-3 cups tea/day    Depression     Hypertriglyceridemia     Obesity     Schizophrenia (HCC)      SURGICAL HISTORY       Past Surgical History:   Procedure Laterality Date    TONSILLECTOMY       CURRENT MEDICATIONS       Previous Medications    ALBUTEROL SULFATE HFA (PROVENTIL;VENTOLIN;PROAIR) 108 (90 BASE) MCG/ACT INHALER        ALFUZOSIN (UROXATRAL) 10 MG EXTENDED RELEASE TABLET    Take 1 tablet by mouth daily    ARIPIPRAZOLE (ABILIFY) 2 MG TABLET        FUROSEMIDE (LASIX) 20 MG TABLET    Take 1 tablet by mouth daily for 5 days    MULTIPLE VITAMIN (MULTIVITAMIN) TABLET    TAKE 1 TAB BY MOUTH ONCE EVERY DAY    NUTRITIONAL SUPPLEMENTS (ENSURE NUTRITION SHAKE) LIQD    Take 2 Bottles by mouth daily    TESTOSTERONE UNDECANOATE (JATENZO) 237 MG

## 2024-07-27 NOTE — ED NOTES
Patient arrives ambulatory to room 5. Reports that he has a spot on his arm that he is concerned about. He has been to a dermatologist to be evaluated and they advised him that it needs to be removed. Patient reports that he is concerned that it is cancerous and wants it to be evaluated.

## 2024-07-27 NOTE — DISCHARGE INSTRUCTIONS
Keep your appointment to see the dermatologist next week.  If you have any concerning symptoms come back to the ER.

## 2024-07-30 ENCOUNTER — HOSPITAL ENCOUNTER (OUTPATIENT)
Age: 49
Setting detail: SPECIMEN
Discharge: HOME | End: 2024-07-30

## 2024-07-30 DIAGNOSIS — R60.0 BILATERAL EDEMA OF LOWER EXTREMITY: ICD-10-CM

## 2024-07-30 LAB
ANION GAP SERPL CALCULATED.3IONS-SCNC: 9 MMOL/L (ref 9–16)
BUN SERPL-MCNC: 8 MG/DL (ref 6–20)
CALCIUM SERPL-MCNC: 8.9 MG/DL (ref 8.6–10.4)
CHLORIDE SERPL-SCNC: 102 MMOL/L (ref 98–107)
CO2 SERPL-SCNC: 27 MMOL/L (ref 20–31)
CREAT SERPL-MCNC: 0.9 MG/DL (ref 0.7–1.2)
GFR, ESTIMATED: >90 ML/MIN/1.73M2
GLUCOSE SERPL-MCNC: 111 MG/DL (ref 74–99)
POTASSIUM SERPL-SCNC: 3.8 MMOL/L (ref 3.7–5.3)
SODIUM SERPL-SCNC: 138 MMOL/L (ref 136–145)

## 2024-07-31 ENCOUNTER — LAB (OUTPATIENT)
Dept: LAB | Age: 49
End: 2024-07-31

## 2024-07-31 ENCOUNTER — OFFICE VISIT (OUTPATIENT)
Dept: DERMATOLOGY | Age: 49
End: 2024-07-31

## 2024-07-31 VITALS
HEART RATE: 82 BPM | DIASTOLIC BLOOD PRESSURE: 81 MMHG | OXYGEN SATURATION: 95 % | WEIGHT: 315 LBS | BODY MASS INDEX: 40.43 KG/M2 | TEMPERATURE: 97.3 F | HEIGHT: 74 IN | SYSTOLIC BLOOD PRESSURE: 153 MMHG

## 2024-07-31 DIAGNOSIS — L71.9 ROSACEA: ICD-10-CM

## 2024-07-31 DIAGNOSIS — L82.1 SK (SEBORRHEIC KERATOSIS): ICD-10-CM

## 2024-07-31 DIAGNOSIS — I89.0 LYMPHEDEMA: ICD-10-CM

## 2024-07-31 DIAGNOSIS — D48.5 NEOPLASM OF UNCERTAIN BEHAVIOR OF SKIN: Primary | ICD-10-CM

## 2024-07-31 RX ORDER — LIDOCAINE HYDROCHLORIDE AND EPINEPHRINE 10; 10 MG/ML; UG/ML
1 INJECTION, SOLUTION INFILTRATION; PERINEURAL ONCE
Status: COMPLETED | OUTPATIENT
Start: 2024-07-31 | End: 2024-08-01

## 2024-07-31 NOTE — PROGRESS NOTES
Dermatology Patient Note  [facility] PHYSICIANS [facility], [facility] DERMATOLOGY  [address]  [address]  [facility] [address]  Dept: [phone]  Dept Fax: [phone]      VISITDATE: 7/31/2024   REFERRING PROVIDER: No ref. provider found      Jonny Westbrook is a 48 y.o. male  who presents today in the office for:    Other (Patient presents today for a one year follow up of dyshidrotic eczema. He has a dry raised spot on his left upper arm. He is concerned about hepatitis C and would like tested. Also a rash on his lower legs, a spot on his abdomen and spot on his ears. )      HISTORY OF PRESENT ILLNESS:  As above.  Lesion on the left bicep is mildly tender to palpation.    MEDICAL PROBLEMS:  Patient Active Problem List    Diagnosis Date Noted    BPH without urinary obstruction 06/10/2024    Hypogonadism in male 10/25/2023    Microhematuria 09/25/2023    Frequency of urination 08/02/2023    Recurrent UTI (urinary tract infection) 07/17/2023    Nocturia 07/17/2023    IFG (impaired fasting glucose) 01/19/2022    Smoker unmotivated to quit 12/21/2020    Schizophrenia (HCC) 08/24/2018    Homicidal ideation 08/22/2018    Morbid obesity due to excess calories (HCC) 03/27/2017       CURRENT MEDICATIONS:   Current Outpatient Medications   Medication Sig Dispense Refill    metroNIDAZOLE (METROCREAM) 0.75 % cream Apply to face BID 45 g 6    benzoyl peroxide 5 % external liquid Wash affected areas once daily 227 g 3    Nutritional Supplements (ENSURE NUTRITION SHAKE) LIQD Take 2 Bottles by mouth daily 60 each 1    alfuzosin (UROXATRAL) 10 MG extended release tablet Take 1 tablet by mouth daily 30 tablet 5    ARIPiprazole (ABILIFY) 2 MG tablet       Testosterone Undecanoate (JATENZO) 237 MG CAPS Take 237 mg by mouth 2 times daily Max Daily Amount: 474 mg 60 capsule 5    vitamin C (ASCORBIC ACID) 500 MG tablet TAKE 2 TABS BY MOUTH ONCE EVERY DAY 60 tablet 5    Multiple Vitamin

## 2024-08-01 RX ADMIN — LIDOCAINE HYDROCHLORIDE AND EPINEPHRINE 1 ML: 10; 10 INJECTION, SOLUTION INFILTRATION; PERINEURAL at 08:54

## 2024-08-05 ENCOUNTER — OFFICE VISIT (OUTPATIENT)
Dept: PODIATRY | Age: 49
End: 2024-08-05

## 2024-08-05 VITALS — BODY MASS INDEX: 40.43 KG/M2 | WEIGHT: 315 LBS | HEIGHT: 74 IN

## 2024-08-05 DIAGNOSIS — R60.0 EDEMA OF LOWER EXTREMITY: ICD-10-CM

## 2024-08-05 DIAGNOSIS — L85.3 XEROSIS CUTIS: ICD-10-CM

## 2024-08-05 DIAGNOSIS — M79.605 BILATERAL LOWER EXTREMITY PAIN: ICD-10-CM

## 2024-08-05 DIAGNOSIS — M79.604 BILATERAL LOWER EXTREMITY PAIN: ICD-10-CM

## 2024-08-05 DIAGNOSIS — B35.1 DERMATOPHYTOSIS OF NAIL: Primary | ICD-10-CM

## 2024-08-05 DIAGNOSIS — L30.9 DERMATITIS: ICD-10-CM

## 2024-08-05 NOTE — PROGRESS NOTES
[facility] PODIATRY [address]  [address]  [place] [id number]  Dept: [phone]  Dept Fax: [phone]     PAIN PROGRESS NOTE  Date of patient's visit: 8/5/2024  Patient's Name:  Jonny Westbrook YOB: 1975            Patient Care Team:  Evita Mirza APRN - CNP as PCP - General (Nurse Practitioner)  Evita Mirza APRN - CNP as PCP - Empaneled Provider  Georgiana Fagan DPM as Physician (Podiatry)  Tramaine Mckeon MD as Consulting Physician (Urology)      Chief Complaint   Patient presents with    Nail Problem     Toenail trim    Foot Pain     Bilateral feet       Subjective:   This Jonny Westbrook comes to clinic for foot and nail care.  Pt currently has complaint of thickened, painful, elongated nails that he/she cannot manage by themselves.  Pt. Relates pain to nails with shoe gear.  Pt's primary care physician is Evita Mirza APRN - CNP last seen 07/23/2024.  Pt has a new complaint of increased swelling to michael LE with increased dry scaley skin to michael feet.  Pt has tried changing shoes but it has not helped the pain    Past Medical History:   Diagnosis Date    Arthritis     Caffeine use     2-3 cups coffee and 2-3 cups tea/day    Depression     Hypertriglyceridemia     Obesity     Schizophrenia (HCC)        Allergies   Allergen Reactions    Codeine Other (See Comments)     hallucinates      Current Outpatient Medications on File Prior to Visit   Medication Sig Dispense Refill    metroNIDAZOLE (METROCREAM) 0.75 % cream Apply to face BID 45 g 6    benzoyl peroxide 5 % external liquid Wash affected areas once daily 227 g 3    Nutritional Supplements (ENSURE NUTRITION SHAKE) LIQD Take 2 Bottles by mouth daily 60 each 1    alfuzosin (UROXATRAL) 10 MG extended release tablet Take 1 tablet by mouth daily 30 tablet 5    ARIPiprazole (ABILIFY) 2 MG tablet       Testosterone Undecanoate (JATENZO) 237 MG CAPS Take 237 mg by mouth 2 times

## 2024-08-06 NOTE — RESULT ENCOUNTER NOTE
We have received and reviewed your biopsy results, which demonstrated a benign skin lesion called a benign verrucal keratosis.  No further Tx is required for this lesion.

## 2024-08-16 ENCOUNTER — TELEPHONE (OUTPATIENT)
Dept: DERMATOLOGY | Age: 49
End: 2024-08-16

## 2024-08-24 ENCOUNTER — HOSPITAL ENCOUNTER (EMERGENCY)
Age: 49
Discharge: HOME | End: 2024-08-24
Attending: EMERGENCY MEDICINE
Payer: MEDICAID

## 2024-08-24 ENCOUNTER — APPOINTMENT (OUTPATIENT)
Dept: VASCULAR LAB | Age: 49
End: 2024-08-24
Payer: MEDICAID

## 2024-08-24 VITALS
OXYGEN SATURATION: 98 % | BODY MASS INDEX: 40.43 KG/M2 | DIASTOLIC BLOOD PRESSURE: 83 MMHG | WEIGHT: 315 LBS | HEIGHT: 74 IN | SYSTOLIC BLOOD PRESSURE: 138 MMHG | RESPIRATION RATE: 21 BRPM | HEART RATE: 92 BPM | TEMPERATURE: 97.5 F

## 2024-08-24 DIAGNOSIS — I87.2 VENOUS STASIS DERMATITIS: Primary | ICD-10-CM

## 2024-08-24 LAB — ECHO BSA: 2.84 M2

## 2024-08-24 PROCEDURE — 93970 EXTREMITY STUDY: CPT

## 2024-08-24 PROCEDURE — 93970 EXTREMITY STUDY: CPT | Performed by: STUDENT IN AN ORGANIZED HEALTH CARE EDUCATION/TRAINING PROGRAM

## 2024-08-24 PROCEDURE — 99284 EMERGENCY DEPT VISIT MOD MDM: CPT

## 2024-08-24 ASSESSMENT — ENCOUNTER SYMPTOMS
ABDOMINAL PAIN: 0
SHORTNESS OF BREATH: 0

## 2024-08-24 NOTE — ED PROVIDER NOTES
[facility]     Emergency Department     Faculty Attestation    I performed a history and physical examination of the patient and discussed management with the resident. I reviewed the resident’s note and agree with the documented findings and plan of care. Any areas of disagreement are noted on the chart. I was personally present for the key portions of any procedures. I have documented in the chart those procedures where I was not present during the key portions. I have reviewed the emergency nurses triage note. I agree with the chief complaint, past medical history, past surgical history, allergies, medications, social and family history as documented unless otherwise noted below.        For Physician Assistant/ Nurse Practitioner cases/documentation I have personally evaluated this patient and have completed at least one if not all key elements of the E/M (history, physical exam, and MDM). Additional findings are as noted.  I have personally seen and evaluated the patient.  I find the patient's history and physical exam are consistent with the NP/PA documentation.  I agree with the care provided, treatment rendered, disposition and follow-up plan.    Patient describes vascular discoloration of both lower extremities for approximately a month and has seen multiple physicians for same.  There is no evidence of erythema there is obvious nonblanching discoloration at approximately mid mid leg or upper ankle which is symmetrical in origin with a brawny appearance to the skin that has highly consistent with stasis dermatitis at this point I see no signs of acute cellulitis there is a small lesion noted on the left thigh that could represent impetigo patient will require follow-up with multiple physicians which does include a dermatologist      Critical Care     Solomon Rojo M.D.  Attending Emergency  Physician           Solomon Rojo MD  08/24/24 2153

## 2024-08-24 NOTE — DISCHARGE INSTRUCTIONS
You were seen today for color change in your legs. You had an Ultrasound that were did not show any blood clots. I recommend you continue to use your compression stockings every day.     If you notice any concerning symptoms please return to the ER immediately. These can include but are not limited to: fevers, chills, shortness of breath, vomiting, weakness of the extremities, changes in your mental status, numbness, pale extremities, or chest pain.     Wound care: none    Diet: You may resume your normal diet     Activity: resume activity as tolerated.     Medications: Continue taking your home medications as previously directed.     Follow up: Please follow up with your primary care doctor within one week or as needed.  Please follow up with your dermatologist.

## 2024-08-24 NOTE — ED NOTES
Patient arrived to the ED with c/o bilateral lower leg edema with vascular discoloration. Patient reports this has been going on for about 1 month. Patient states he was seen a couple times for this with his family MD. Patient states she was given meds and compression stockings. Patient states he only wore the stockings twice. Patient is ambulatory without difficulty. Vitals are stable.

## 2024-08-24 NOTE — ED PROVIDER NOTES
[facility] ED  Emergency Department Encounter  Emergency Medicine Resident     Pt Name:Jonny Westbrook  MRN: [id number]  Birthdate 1975  Date of evaluation: 8/24/24  PCP:  Evita Mirza APRN - CNP  Note Started: 12:10 PM EDT      CHIEF COMPLAINT       Chief Complaint   Patient presents with    Rash     Bilateral lower legs       HISTORY OF PRESENT ILLNESS  (Location/Symptom, Timing/Onset, Context/Setting, Quality, Duration, Modifying Factors, Severity.)      Jonny Westbrook is a 48 y.o. male who presents with complaint of persistent purple discoloration and swelling of the lower extremities, which has been present for approximately one month. The patient initially noticed the discoloration and swelling upon returning from a vacation. He sought medical attention at [facility], where he was initially diagnosed with a rash and treated with a topical medication. Subsequently, his podiatrist recommended compression stockings, which he has been using for the past two days without significant improvement.    His family doctor at [facility] suspected a bacterial infection and prescribed antibiotics, but the symptoms have persisted despite treatment. The patient notes that the swelling is more pronounced on the left side compared to the right, although both legs are affected. He describes a sensation of pressure in the affected areas.    He denies any shortness of breath or chest pain.    Per chart review patient was seen by his podiatrist on 8/5 and was recommended to wear compression stockings and use NSAIDs as needed.  Also prescribed a lachydrin cream for xerosis.  He was seen by his dermatologist on 7/31 for his history of dyshidrotic eczema.  He was diagnosed with lymphedema at that time and was recommended to follow-up with the [facility] lymphedema clinic.    PAST MEDICAL / SURGICAL / SOCIAL / FAMILY HISTORY      has a past medical history of Arthritis, Caffeine use, Depression, Hypertriglyceridemia, Obesity,  Exam  Constitutional:       General: He is not in acute distress.  HENT:      Head: Normocephalic and atraumatic.   Cardiovascular:      Rate and Rhythm: Normal rate and regular rhythm.      Pulses:           Dorsalis pedis pulses are 2+ on the right side and 2+ on the left side.        Posterior tibial pulses are 2+ on the right side and 2+ on the left side.      Heart sounds: Normal heart sounds.   Pulmonary:      Effort: Pulmonary effort is normal.      Breath sounds: Normal breath sounds.   Abdominal:      General: There is no distension.      Palpations: Abdomen is soft.      Tenderness: There is no abdominal tenderness. There is no guarding.   Musculoskeletal:      Right lower leg: Edema present.      Left lower leg: Edema present.   Skin:     Comments: Chronic appearing lower leg skin discoloration, ready in appearance, mild blanching.  Diffuse subcentimeter lesions to the left leg with various stages of healing, granulation tissue.  Bilateral lower extremities are scaly in appearance.   Neurological:      Mental Status: He is alert.           DDX/DIAGNOSTIC RESULTS / EMERGENCY DEPARTMENT COURSE / MDM     Medical Decision Making  Bilateral lower extremity edema and color change  Patient has history of dyshidrotic eczema and follows with dermatology.  Recommend to continue using benzoyl peroxide wash as directed  Has been evaluated by podiatry and his PCP.  Has compression stockings continue to use these  Bilateral lower extremity ultrasound with no evidence of DVT  New prescription for benzoyl peroxide wash sent to his pharmacy.  Discussed with him continuing use of compression stockings.  No evidence of infection today no indication to start antibiotics  Recommend follow-up with his primary care and dermatologist.Discussed follow up and return precautions with patient and they vocalized understanding.           EKG      All EKG's are interpreted by the Emergency Department Physician who either signs or  Co-signs this chart in the absence of a cardiologist.    EMERGENCY DEPARTMENT COURSE:      ED Course as of 08/24/24 1523   Sat Aug 24, 2024   1447 Vascular duplex lower extremity venous bilateral   No evidence of deep vein or superficial vein thrombosis in the right   lower extremity. Vessels demonstrate normal compressibility, color   filling, and phasic and spontaneous flow.   ·  No evidence of deep vein or superficial vein thrombosis in the left   lower extremity. Vessels demonstrate normal compressibility, color   filling, and phasic and spontaneous flow.    [SK]      ED Course User Index  [SK] Anahi Pack DO       PROCEDURES:      CONSULTS:  None    CRITICAL CARE:  There was significant risk of life threatening deterioration of patient's condition requiring my direct management. Critical care time 0 minutes, excluding any documented procedures.    FINAL IMPRESSION      1. Venous stasis dermatitis          DISPOSITION / PLAN     DISPOSITION Decision To Discharge 08/24/2024 02:48:34 PM  Condition at Disposition: Good      PATIENT REFERRED TO:  Evita Mirza APRN - CNP  [address] [address]  [phone]            DISCHARGE MEDICATIONS:  Discharge Medication List as of 8/24/2024  3:15 PM          Anahi Pack DO  Emergency Medicine Resident    (Please note that portions of this note were completed with a voice recognition program.  Efforts were made to edit the dictations but occasionally words are mis-transcribed.)

## 2024-08-27 ENCOUNTER — TELEPHONE (OUTPATIENT)
Dept: DERMATOLOGY | Age: 49
End: 2024-08-27

## 2024-09-16 DIAGNOSIS — E63.9 NUTRITIONAL DEFICIENCY: ICD-10-CM

## 2024-09-16 RX ORDER — LACTOSE-REDUCED FOOD
LIQUID (ML) ORAL
Qty: 60 EACH | Refills: 3 | Status: SHIPPED | OUTPATIENT
Start: 2024-09-16

## 2024-09-24 ENCOUNTER — OFFICE VISIT (OUTPATIENT)
Dept: DERMATOLOGY | Age: 49
End: 2024-09-24
Payer: MEDICAID

## 2024-09-24 VITALS
TEMPERATURE: 99.9 F | HEIGHT: 74 IN | DIASTOLIC BLOOD PRESSURE: 76 MMHG | BODY MASS INDEX: 40.43 KG/M2 | HEART RATE: 90 BPM | WEIGHT: 315 LBS | SYSTOLIC BLOOD PRESSURE: 118 MMHG | OXYGEN SATURATION: 95 %

## 2024-09-24 DIAGNOSIS — I87.2 STASIS DERMATITIS OF BOTH LEGS: ICD-10-CM

## 2024-09-24 DIAGNOSIS — L20.89 OTHER ATOPIC DERMATITIS: ICD-10-CM

## 2024-09-24 DIAGNOSIS — R22.9 SUBCUTANEOUS NODULES: Primary | ICD-10-CM

## 2024-09-24 DIAGNOSIS — L71.9 ROSACEA: ICD-10-CM

## 2024-09-24 PROCEDURE — 99214 OFFICE O/P EST MOD 30 MIN: CPT | Performed by: PHYSICIAN ASSISTANT

## 2024-09-24 PROCEDURE — G8427 DOCREV CUR MEDS BY ELIG CLIN: HCPCS | Performed by: PHYSICIAN ASSISTANT

## 2024-09-24 PROCEDURE — 3017F COLORECTAL CA SCREEN DOC REV: CPT | Performed by: PHYSICIAN ASSISTANT

## 2024-09-24 PROCEDURE — 4004F PT TOBACCO SCREEN RCVD TLK: CPT | Performed by: PHYSICIAN ASSISTANT

## 2024-09-24 PROCEDURE — G8417 CALC BMI ABV UP PARAM F/U: HCPCS | Performed by: PHYSICIAN ASSISTANT

## 2024-09-24 RX ORDER — CLOBETASOL PROPIONATE 0.5 MG/G
CREAM TOPICAL
Qty: 60 G | Refills: 2 | Status: SHIPPED | OUTPATIENT
Start: 2024-09-24

## 2024-09-24 RX ORDER — TRIAMCINOLONE ACETONIDE 1 MG/G
CREAM TOPICAL
Qty: 454 G | Refills: 1 | Status: SHIPPED | OUTPATIENT
Start: 2024-09-24

## 2024-09-24 RX ORDER — IVERMECTIN 10 MG/G
CREAM TOPICAL
Qty: 45 G | Refills: 3 | Status: SHIPPED | OUTPATIENT
Start: 2024-09-24

## 2024-09-30 ENCOUNTER — HOSPITAL ENCOUNTER (OUTPATIENT)
Dept: ULTRASOUND IMAGING | Facility: CLINIC | Age: 49
Discharge: HOME | End: 2024-10-02
Payer: COMMERCIAL

## 2024-09-30 DIAGNOSIS — R22.9 SUBCUTANEOUS NODULES: ICD-10-CM

## 2024-09-30 DIAGNOSIS — Z78.9 TAKES DAILY MULTIVITAMINS: ICD-10-CM

## 2024-09-30 PROCEDURE — 76999 ECHO EXAMINATION PROCEDURE: CPT

## 2024-09-30 RX ORDER — ASCORBIC ACID 500 MG
TABLET ORAL
Qty: 60 TABLET | Refills: 1 | Status: SHIPPED | OUTPATIENT
Start: 2024-09-30

## 2024-09-30 RX ORDER — MULTIVITAMIN WITH FOLIC ACID 400 MCG
TABLET ORAL
Qty: 30 TABLET | Refills: 1 | Status: SHIPPED | OUTPATIENT
Start: 2024-09-30

## 2024-09-30 NOTE — TELEPHONE ENCOUNTER
Jonny Westbrook is calling to request a refill on the following medication(s):    Medication Request:  Requested Prescriptions     Pending Prescriptions Disp Refills    vitamin C (ASCORBIC ACID) 500 MG tablet [Pharmacy Med Name: VITAMIN C 500 MG TABLET] 60 tablet 1     Sig: TAKE 2 TABS BY MOUTH ONCE EVERY DAY    Multiple Vitamin (MULTIVITAMIN) tablet [Pharmacy Med Name: TAB-A-INGE TABLET] 30 tablet 1     Sig: TAKE 1 TAB BY MOUTH ONCE EVERY DAY       Last Visit Date (If Applicable):  7/23/2024    Next Visit Date:    10/4/2024

## 2024-10-04 ENCOUNTER — TELEPHONE (OUTPATIENT)
Dept: FAMILY MEDICINE CLINIC | Age: 49
End: 2024-10-04

## 2024-10-04 ENCOUNTER — HOSPITAL ENCOUNTER (OUTPATIENT)
Age: 49
Setting detail: SPECIMEN
Discharge: HOME | End: 2024-10-04

## 2024-10-04 LAB
ALBUMIN SERPL-MCNC: 4.1 G/DL (ref 3.5–5.2)
ALBUMIN/GLOB SERPL: 1 {RATIO} (ref 1–2.5)
ALP SERPL-CCNC: 70 U/L (ref 40–129)
ALT SERPL-CCNC: 30 U/L (ref 10–50)
AST SERPL-CCNC: 30 U/L (ref 10–50)
BILIRUB DIRECT SERPL-MCNC: 0.2 MG/DL (ref 0–0.2)
BILIRUB INDIRECT SERPL-MCNC: 0.2 MG/DL (ref 0–1)
BILIRUB SERPL-MCNC: 0.4 MG/DL (ref 0–1.2)
GLOBULIN SER CALC-MCNC: 2.9 G/DL
PROT SERPL-MCNC: 7 G/DL (ref 6.6–8.7)

## 2024-10-04 NOTE — TELEPHONE ENCOUNTER
Left message to return call Pt is scheduled for a CPX. Today and it is to soon he had phys. In January  he needs to reschedule.

## 2024-10-07 ENCOUNTER — HOSPITAL ENCOUNTER (OUTPATIENT)
Dept: VASCULAR LAB | Age: 49
Discharge: HOME | End: 2024-10-09
Payer: COMMERCIAL

## 2024-10-07 DIAGNOSIS — I87.2 STASIS DERMATITIS OF BOTH LEGS: ICD-10-CM

## 2024-10-07 LAB
VAS LEFT AASV PROX DIAM: 4.1 MM
VAS LEFT GSV ANKLE DIAM: 4 MM
VAS LEFT GSV AT KNEE DIAM: 4.2 MM
VAS LEFT GSV BK MID DIAM: 3.7 MM
VAS LEFT GSV THIGH MID DIAM: 4.6 MM
VAS LEFT GSV THIGH PROX DIAM: 4.4 MM
VAS LEFT SSV PROX DIAM: 3.2 MM
VAS RIGHT AASV PROX DIAM: 9.1 MM
VAS RIGHT GSV ANKLE DIAM: 4.3 MM
VAS RIGHT GSV AT KNEE DIAM: 5.4 MM
VAS RIGHT GSV BK MID DIAM: 2.9 MM
VAS RIGHT GSV THIGH MID DIAM: 4.4 MM
VAS RIGHT GSV THIGH PROX DIAM: 6.3 MM
VAS RIGHT SSV PROX DIAM: 4.4 MM

## 2024-10-07 PROCEDURE — 93970 EXTREMITY STUDY: CPT | Performed by: SURGERY

## 2024-10-07 PROCEDURE — 93970 EXTREMITY STUDY: CPT

## 2024-10-16 PROBLEM — I87.2 VENOUS STASIS DERMATITIS OF BOTH LOWER EXTREMITIES: Status: ACTIVE | Noted: 2024-10-16

## 2024-10-17 ENCOUNTER — TELEPHONE (OUTPATIENT)
Dept: VASCULAR SURGERY | Age: 49
End: 2024-10-17

## 2024-10-17 NOTE — TELEPHONE ENCOUNTER
M for patient to let him know that Dr. Delos Reyes will not be in the office on 10/23 and that his appt needs to be sherly

## 2024-10-30 ENCOUNTER — INITIAL CONSULT (OUTPATIENT)
Age: 49
End: 2024-10-30
Payer: COMMERCIAL

## 2024-10-30 VITALS
WEIGHT: 315 LBS | SYSTOLIC BLOOD PRESSURE: 130 MMHG | OXYGEN SATURATION: 98 % | RESPIRATION RATE: 18 BRPM | BODY MASS INDEX: 40.43 KG/M2 | HEIGHT: 74 IN | HEART RATE: 84 BPM | DIASTOLIC BLOOD PRESSURE: 84 MMHG

## 2024-10-30 DIAGNOSIS — I87.2 VENOUS STASIS DERMATITIS OF BOTH LOWER EXTREMITIES: Primary | ICD-10-CM

## 2024-10-30 PROCEDURE — G8427 DOCREV CUR MEDS BY ELIG CLIN: HCPCS | Performed by: SURGERY

## 2024-10-30 PROCEDURE — 4004F PT TOBACCO SCREEN RCVD TLK: CPT | Performed by: SURGERY

## 2024-10-30 PROCEDURE — G8484 FLU IMMUNIZE NO ADMIN: HCPCS | Performed by: SURGERY

## 2024-10-30 PROCEDURE — 3017F COLORECTAL CA SCREEN DOC REV: CPT | Performed by: SURGERY

## 2024-10-30 PROCEDURE — 99203 OFFICE O/P NEW LOW 30 MIN: CPT | Performed by: SURGERY

## 2024-10-30 PROCEDURE — G8417 CALC BMI ABV UP PARAM F/U: HCPCS | Performed by: SURGERY

## 2024-10-30 RX ORDER — CLOTRIMAZOLE AND BETAMETHASONE DIPROPIONATE 10; .64 MG/G; MG/G
CREAM TOPICAL
COMMUNITY
Start: 2024-10-29

## 2024-10-30 NOTE — PROGRESS NOTES
[facility] VASCULAR SURGERY CLINIC  [facility]  [address] [address]  [phone]    Jonny Westbrook  1975  49 y.o.male       Chief Complaint:  Chief Complaint   Patient presents with    Consultation     REQUEST FOR PHYSICAL THERAPY   venous stasis *reflux study 10/7/24*  Pain in ankles        History of present Illness:  This is a 49 y.o.male who was concerned about chronic skin changes to both legs.  He sees a purple discoloration which I think is relatively mild.  He says over the summer he was staying at a cottage and developed swelling in both legs.  After which time he had some discoloration and rash form in both legs and noted to be stasis dermatitis.  I reviewed her recent venous reflux ultrasound which shows no significant deep or superficial vein reflux disease.    He tells me that for the week prior to developing this rash he was sleeping in a chair next to an air conditioner and his legs were severely swollen at the time.  This is all consistent with stasis dermatitis.  He also tells me that he was diagnosed with sleep apnea but refuses to wear the CPAP.  I think a combination of sleep apnea, obesity, and occasionally sleeping in a chair lead to dependent edema.  My recommendation is that he wear compression stockings.  He ready has some chronic skin changes related to chronic swelling where the skin shows lipo dermatosclerosis.    There is no correctable venous disease and I think it combination of weight loss, increased activity, and treating his sleep apnea would prevent long-term complications.  Past Medical History:  has a past medical history of Arthritis, Caffeine use, Depression, Hypertriglyceridemia, Obesity, and Schizophrenia ([facility]).    Past Surgical History:   Past Surgical History:   Procedure Laterality Date    TONSILLECTOMY         Social History:  reports that he has been smoking cigarettes. He started smoking about 32 years ago. He

## 2024-11-04 ENCOUNTER — HOSPITAL ENCOUNTER (OUTPATIENT)
Age: 49
Setting detail: SPECIMEN
Discharge: HOME | End: 2024-11-04

## 2024-11-04 LAB
ALBUMIN SERPL-MCNC: 4.1 G/DL (ref 3.5–5.2)
ALBUMIN/GLOB SERPL: 1.3 {RATIO} (ref 1–2.5)
ALP SERPL-CCNC: 77 U/L (ref 40–129)
ALT SERPL-CCNC: 22 U/L (ref 10–50)
AST SERPL-CCNC: 24 U/L (ref 10–50)
BILIRUB DIRECT SERPL-MCNC: 0.2 MG/DL (ref 0–0.2)
BILIRUB INDIRECT SERPL-MCNC: 0.2 MG/DL (ref 0–1)
BILIRUB SERPL-MCNC: 0.4 MG/DL (ref 0–1.2)
GLOBULIN SER CALC-MCNC: 3.1 G/DL
PROT SERPL-MCNC: 7.2 G/DL (ref 6.6–8.7)

## 2024-11-07 ENCOUNTER — TELEPHONE (OUTPATIENT)
Dept: ADMINISTRATIVE | Age: 49
End: 2024-11-07

## 2024-11-07 NOTE — TELEPHONE ENCOUNTER
Pt called in states got a call from Anahi.    Spoke to Kristal @ Dr. Fagan office and she was not aware of who Anahi may be, as there is no one by that name in their office or the Dermatology office group who shares their space.    Also states that the pt is in good standing with the Podiatry office and writer advised that pt would be calling to set up new appt once he got his transportation issues updated.    States it might have been with Dr. Delos Reyes team who sees pt's on Wed.

## 2024-11-11 NOTE — TELEPHONE ENCOUNTER
Pt states that he is calling because he received a call from a Lindsey. He says he does not want to make an appt with Dr. Fagan at this time. He says it's OK to leave a detailed voicemail message, but to ask that it be shared with him as he lives in a group home.    Phone: [phone]

## 2024-11-18 DIAGNOSIS — E29.1 HYPOGONADISM IN MALE: ICD-10-CM

## 2024-11-18 RX ORDER — ALFUZOSIN HYDROCHLORIDE 10 MG/1
TABLET, EXTENDED RELEASE ORAL
Qty: 30 TABLET | Refills: 6 | Status: SHIPPED | OUTPATIENT
Start: 2024-11-18

## 2024-11-19 ENCOUNTER — TELEPHONE (OUTPATIENT)
Dept: DERMATOLOGY | Age: 49
End: 2024-11-19

## 2024-11-19 DIAGNOSIS — L71.9 ROSACEA: Primary | ICD-10-CM

## 2024-11-19 RX ORDER — AZELAIC ACID 0.2 G/G
CREAM CUTANEOUS
Qty: 50 G | Refills: 2 | Status: SHIPPED | OUTPATIENT
Start: 2024-11-19

## 2024-11-19 RX ORDER — TESTOSTERONE UNDECANOATE 237 MG/1
1 CAPSULE, LIQUID FILLED ORAL 2 TIMES DAILY
Qty: 60 CAPSULE | Refills: 5 | Status: SHIPPED | OUTPATIENT
Start: 2024-11-19

## 2024-11-19 NOTE — TELEPHONE ENCOUNTER
Patient does not want to pay OOP for ivermectin. Please send azelaic acid to t/f.     He has only failed metronidazole.  It looks like they want him to fail azelaic acid as well.  We can do this, and recheck him in 8 weeks, or he can pay OOP for compounded ivermectin cream.

## 2024-11-25 ENCOUNTER — TELEPHONE (OUTPATIENT)
Dept: FAMILY MEDICINE CLINIC | Age: 49
End: 2024-11-25

## 2024-11-25 ENCOUNTER — OFFICE VISIT (OUTPATIENT)
Dept: FAMILY MEDICINE CLINIC | Age: 49
End: 2024-11-25
Payer: COMMERCIAL

## 2024-11-25 VITALS
BODY MASS INDEX: 46.61 KG/M2 | HEART RATE: 88 BPM | SYSTOLIC BLOOD PRESSURE: 118 MMHG | WEIGHT: 315 LBS | TEMPERATURE: 97.5 F | DIASTOLIC BLOOD PRESSURE: 80 MMHG | OXYGEN SATURATION: 95 %

## 2024-11-25 DIAGNOSIS — F17.200 SMOKER: ICD-10-CM

## 2024-11-25 DIAGNOSIS — F20.9 SCHIZOPHRENIA, UNSPECIFIED TYPE (HCC): ICD-10-CM

## 2024-11-25 DIAGNOSIS — I87.2 VENOUS STASIS DERMATITIS OF BOTH LOWER EXTREMITIES: ICD-10-CM

## 2024-11-25 DIAGNOSIS — E66.01 MORBID OBESITY DUE TO EXCESS CALORIES: Primary | ICD-10-CM

## 2024-11-25 PROBLEM — R35.0 FREQUENCY OF URINATION: Status: RESOLVED | Noted: 2023-08-02 | Resolved: 2024-11-25

## 2024-11-25 PROCEDURE — 3017F COLORECTAL CA SCREEN DOC REV: CPT | Performed by: NURSE PRACTITIONER

## 2024-11-25 PROCEDURE — 99214 OFFICE O/P EST MOD 30 MIN: CPT | Performed by: NURSE PRACTITIONER

## 2024-11-25 PROCEDURE — G8417 CALC BMI ABV UP PARAM F/U: HCPCS | Performed by: NURSE PRACTITIONER

## 2024-11-25 PROCEDURE — G8484 FLU IMMUNIZE NO ADMIN: HCPCS | Performed by: NURSE PRACTITIONER

## 2024-11-25 PROCEDURE — G8427 DOCREV CUR MEDS BY ELIG CLIN: HCPCS | Performed by: NURSE PRACTITIONER

## 2024-11-25 PROCEDURE — 4004F PT TOBACCO SCREEN RCVD TLK: CPT | Performed by: NURSE PRACTITIONER

## 2024-11-25 ASSESSMENT — ENCOUNTER SYMPTOMS: CONSTIPATION: 1

## 2024-11-25 ASSESSMENT — PATIENT HEALTH QUESTIONNAIRE - PHQ9
SUM OF ALL RESPONSES TO PHQ QUESTIONS 1-9: 0
SUM OF ALL RESPONSES TO PHQ QUESTIONS 1-9: 0
SUM OF ALL RESPONSES TO PHQ9 QUESTIONS 1 & 2: 0
SUM OF ALL RESPONSES TO PHQ QUESTIONS 1-9: 0
2. FEELING DOWN, DEPRESSED OR HOPELESS: NOT AT ALL
SUM OF ALL RESPONSES TO PHQ QUESTIONS 1-9: 0
1. LITTLE INTEREST OR PLEASURE IN DOING THINGS: NOT AT ALL

## 2024-11-25 NOTE — PROGRESS NOTES
exercise.      Of the  30  minute duration appointment visit, Evita Mirza CNP spent at least 50% of the face-to-face time in counseling, explanation of diagnosis, planning of further management, and answering all questions.          Signed:  Evita Mirza CNP    The patient (or guardian, if applicable) and other individuals in attendance with the patient were advised that Artificial Intelligence will be utilized during this visit to record, process the conversation to generate a clinical note, and support improvement of the AI technology. The patient (or guardian, if applicable) and other individuals in attendance at the appointment consented to the use of AI, including the recording.

## 2024-11-25 NOTE — TELEPHONE ENCOUNTER
Left message to return call  he does not  need to be seen today his AMWV is scheduled in January and if he needs a ref. Evita will put in a Ref

## 2024-11-27 NOTE — TELEPHONE ENCOUNTER
Patient azelaic acid has been approved and was sent to [facility] pharmacy. Pt was informed today that it was approved and on our it shows that it was sent and they received it. I refaxed the approval to them now. Patient has been informed

## 2024-12-16 DIAGNOSIS — Z78.9 TAKES DAILY MULTIVITAMINS: ICD-10-CM

## 2024-12-16 RX ORDER — MULTIVITAMIN WITH FOLIC ACID 400 MCG
TABLET ORAL
Qty: 30 TABLET | Refills: 4 | Status: SHIPPED | OUTPATIENT
Start: 2024-12-16

## 2024-12-16 RX ORDER — ASCORBIC ACID 500 MG
TABLET ORAL
Qty: 60 TABLET | Refills: 4 | Status: SHIPPED | OUTPATIENT
Start: 2024-12-16

## 2024-12-16 NOTE — TELEPHONE ENCOUNTER
Jonny Westbrook is calling to request a refill on the following medication(s):    Medication Request:  Requested Prescriptions     Pending Prescriptions Disp Refills    Multiple Vitamin (MULTIVITAMIN) tablet [Pharmacy Med Name: TAB-A-INGE TABLET] 30 tablet 4     Sig: TAKE 1 TAB BY MOUTH ONCE EVERY DAY    vitamin C (ASCORBIC ACID) 500 MG tablet [Pharmacy Med Name: VITAMIN C 500 MG TABLET] 60 tablet 4     Sig: TAKE 2 TABS BY MOUTH ONCE EVERY DAY       Last Visit Date (If Applicable):  11/25/2024    Next Visit Date:    1/3/2025

## 2024-12-23 ENCOUNTER — HOSPITAL ENCOUNTER (OUTPATIENT)
Dept: OCCUPATIONAL THERAPY | Age: 49
Setting detail: THERAPIES SERIES
Discharge: HOME | End: 2024-12-23

## 2024-12-23 NOTE — SIGNIFICANT EVENT
[x][facility]  Outpatient Rehabilitation &  Therapy  [address], [address]  P: [phone]  F: [phone] [][facility]  Outpatient Rehabilitation &  Therapy  [address]  P: [phone]  F: [phone] [][facility]  Outpatient Rehabilitation &  Therapy  [address]   P: [phone]  F: [phone]        Outpatient Lymphedema Occupational Therapy Cancel/No Show note    Date: 2024  Patient: Jonny Westbrook  : 1975  MRN: [id number]    Cancels/No Shows to date: 1    For today's appointment patient:    [x]  Cancelled    [] Rescheduled appointment    [] No-show     Reason given by patient:    [x]  Patient ill    []  Conflicting appointment    [] No transportation      [] Conflict with work    [] No reason given    [] Weather related    [] COVID-19    [] Other       Comments: CX has pneumonia/db 24 Spk w/pt to confirm eval on 24 (OT/[facility] [address])  Faxed order to        [] Next appointment was confirmed    [] Left message informing of missed visit    [] Unable to contact    [] Other:            Electronically signed by: Sanjana Riley OT

## 2025-01-03 ENCOUNTER — OFFICE VISIT (OUTPATIENT)
Dept: FAMILY MEDICINE CLINIC | Age: 50
End: 2025-01-03

## 2025-01-03 VITALS
BODY MASS INDEX: 44.94 KG/M2 | OXYGEN SATURATION: 94 % | HEART RATE: 83 BPM | DIASTOLIC BLOOD PRESSURE: 72 MMHG | TEMPERATURE: 97.1 F | WEIGHT: 315 LBS | SYSTOLIC BLOOD PRESSURE: 120 MMHG

## 2025-01-03 DIAGNOSIS — Z00.00 MEDICARE ANNUAL WELLNESS VISIT, SUBSEQUENT: Primary | ICD-10-CM

## 2025-01-03 DIAGNOSIS — E66.01 MORBID OBESITY DUE TO EXCESS CALORIES: ICD-10-CM

## 2025-01-03 ASSESSMENT — PATIENT HEALTH QUESTIONNAIRE - PHQ9
SUM OF ALL RESPONSES TO PHQ QUESTIONS 1-9: 0
1. LITTLE INTEREST OR PLEASURE IN DOING THINGS: NOT AT ALL
SUM OF ALL RESPONSES TO PHQ9 QUESTIONS 1 & 2: 0
SUM OF ALL RESPONSES TO PHQ QUESTIONS 1-9: 0
2. FEELING DOWN, DEPRESSED OR HOPELESS: NOT AT ALL

## 2025-01-03 ASSESSMENT — LIFESTYLE VARIABLES
HOW MANY STANDARD DRINKS CONTAINING ALCOHOL DO YOU HAVE ON A TYPICAL DAY: PATIENT DOES NOT DRINK
HOW OFTEN DO YOU HAVE A DRINK CONTAINING ALCOHOL: NEVER

## 2025-01-03 NOTE — PROGRESS NOTES
Allergies   Allergen Reactions    Codeine Other (See Comments)     hallucinates      Prior to Visit Medications    Medication Sig Taking? Authorizing Provider   Multiple Vitamin (MULTIVITAMIN) tablet TAKE 1 TAB BY MOUTH ONCE EVERY DAY Yes Evita Mirza APRN - CNP   vitamin C (ASCORBIC ACID) 500 MG tablet TAKE 2 TABS BY MOUTH ONCE EVERY DAY Yes Evita Mirza APRN - CNP   Testosterone Undecanoate (JATENZO) 237 MG CAPS TAKE ONE CAPSULE BY MOUTH TWICE A DAY Yes Tramaine Mckeon MD   alfuzosin (UROXATRAL) 10 MG extended release tablet TAKE 1 TAB BY MOUTH ONCE EVERY DAY Yes Tramaine Mckeon MD   Nutritional Supplements (ENSURE NUTRITION SHAKE) LIQD Take 2 Bottles by mouth daily (NUTRITIONAL DEFICIENCY) CHOCOLATE ONLY Yes Evita Mirza APRN - CNP   ARIPiprazole (ABILIFY) 2 MG tablet Take 2.5 tablets by mouth daily Yes Provider, MD Alise   furosemide (LASIX) 20 MG tablet Take 1 tablet by mouth daily for 5 days  Patient not taking: Reported on 11/25/2024  Evita Mirza APRN - CNP       CareTeam (Including outside providers/suppliers regularly involved in providing care):   Patient Care Team:  Evita Mirza APRN - CNP as PCP - General (Nurse Practitioner)  Evita Mirza APRN - CNP as PCP - Empaneled Provider  Georgiana Fagan DPM as Physician (Podiatry)  Tramaine Mckeon MD as Consulting Physician (Urology)  Pietro Navarro PA-C as Physician Assistant (Dermatology)     Recommendations for Preventive Services Due: see orders and patient instructions/AVS.  Recommended screening schedule for the next 5-10 years is provided to the patient in written form: see Patient Instructions/AVS.     Reviewed and updated this visit:  Tobacco  Allergies  Meds  Problems  Med Hx  Surg Hx  Soc Hx  Fam Hx              The patient (or guardian, if applicable) and other individuals in attendance with the patient were advised that Artificial Intelligence will be utilized during this visit to record and

## 2025-01-15 ENCOUNTER — TELEPHONE (OUTPATIENT)
Dept: FAMILY MEDICINE CLINIC | Age: 50
End: 2025-01-15

## 2025-01-15 ENCOUNTER — HOSPITAL ENCOUNTER (OUTPATIENT)
Age: 50
Setting detail: THERAPIES SERIES
Discharge: HOME | End: 2025-01-15
Payer: COMMERCIAL

## 2025-01-15 PROCEDURE — 97535 SELF CARE MNGMENT TRAINING: CPT

## 2025-01-15 PROCEDURE — 97166 OT EVAL MOD COMPLEX 45 MIN: CPT

## 2025-01-15 NOTE — TELEPHONE ENCOUNTER
----- Message from Carrington BAEZ sent at 1/14/2025  9:29 AM EST -----  Regarding: E[facility] Appointment Request  E[facility] Appointment Request    Patient needs appointment for E[facility] Appointment Type: Annual Visit.    Patient Requested Dates(s): Any day in February but February 24th  Patient Requested Time: Late afternoon  Provider Name: Evita Mirza CNP     Reason for Appointment Request: Established Patient - No appointments available during search    Additional Info : Pt would like to schedule an appointment to see Evita Mirza for a physical appointment preferably in February.   --------------------------------------------------------------------------------------------------------------------------    Relationship to Patient: Self     Call Back Information: OK to leave message on voicemail  Preferred Call Back Number: Phone [phone]

## 2025-01-15 NOTE — CONSULTS
protein, fresh foods, and drink more water.     Shoes/Socks  Start wearing shoes daily if you are not doing so already. Shoes can help control swelling. Wear shoes that provide good coverage over the entire foot. Avoid socks that have an elastic band at the opening. Diabetic socks are best.     Identify a [place]  Find someone who will be able to help you long term at home. Depending on your specific needs, you may need someone to help you with your compression garments in the future.       Response to treatment: Pt verbalizes and is agreeable to the instructions/ POC established at today's evaluation.        PLAN FOR NEXT VISIT:   Measure and order Knee high compression socks for daytime, teach self MLD and decongestive exs  Action steps to be completed by therapist:     Action steps to be completed by patient:      Lymphedema Stage per ISL Guidelines    [] 0: Subclinical with no evidence on physical exam, pt may be feeling subjective symptoms  [] 1:  Early onset, swelling/heaviness, pitting edema, subsides with limb elevation  [x] 2:  More advanced, fibrosis resulting in non-pitting edema, does not respond to elevation, thickening of the tissues  [] 3: Elephantiasis, pitting absent, huge limbs with dry/scaly, papillomatosis, hyperkeratosis, fluid may be leaking with recurrent cellulitis. Acanthosis (deep body folds). Elevation &   diuretics ineffective.       Therapy Goals    Patient's Goal: STG - To be addressed within 4 visits    Pt will be educated on lymphedema risk reduction practices to reduce the risk of infection, progression of disease, exacerbations, and functional morbidity with use of BLE during functional mobility.     Pt will demonstrate compliance with skin care routine for improved overall skin integrity to decrease risk of wounds, infection, and hospitalization.      Pt will demonstrate independence with decongestive exercise program in order to promote healthy lymphatic flow by

## 2025-01-16 DIAGNOSIS — E63.9 NUTRITIONAL DEFICIENCY: ICD-10-CM

## 2025-01-16 RX ORDER — LACTOSE-REDUCED FOOD
LIQUID (ML) ORAL
Qty: 30 EACH | Refills: 3 | Status: SHIPPED | OUTPATIENT
Start: 2025-01-16

## 2025-01-16 NOTE — TELEPHONE ENCOUNTER
Jonny Westbrook is calling to request a refill on the following medication(s):    Medication Request:  Requested Prescriptions     Pending Prescriptions Disp Refills    Nutritional Supplements (ENSURE NUTRITION SHAKE) LIQD [Pharmacy Med Name: ENSURE CHOCOLATE CAN] 30 each 3     Sig: Take 2 Bottles by mouth daily (NUTRITIONAL DEFICIENCY) CHOCOLATE ONLY       Last Visit Date (If Applicable):  1/3/2025    Next Visit Date:    1/5/2026

## 2025-01-22 ENCOUNTER — HOSPITAL ENCOUNTER (OUTPATIENT)
Age: 50
Setting detail: THERAPIES SERIES
Discharge: HOME | End: 2025-01-22
Payer: COMMERCIAL

## 2025-01-22 PROCEDURE — 97535 SELF CARE MNGMENT TRAINING: CPT

## 2025-01-22 PROCEDURE — 97140 MANUAL THERAPY 1/> REGIONS: CPT

## 2025-01-22 NOTE — FLOWSHEET NOTE
TREATMENT LOCATION:   [x] [facility]  Outpatient Rehabilitation &  Therapy  [address], [address]  P: [phone]  F: [phone] [] [facility]   Outpatient Rehabilitation &  Therapy  [address]  P: [phone]  F: [phone] [] [facility]  Outpatient Rehabilitation &  Therapy  [address]   P: [phone]  F: [phone]        Lymphedema Services - Treatment Note of the Lower Extremity    Date:  2025  Patient: Jonny Westbrook  : 1975             MRN: [id number]  Referring Provider: Pietro Navarro PA-C       Phone: [phone]  Fax: [phone]  Insurance: [employer]  (ID:[id number] ) -         Medical Diagnosis:  Lymphedema [I89.0]   Rehab Codes: I89.0, I87.2 - venous insufficiency (chronic) (peripheral)  Onset Date:   Visit# / total visits: 2/5 scheduled;    BASED ON MEDICAL NECESSITY, PT/OT/ST ARE COMBINED, IF SEEN ON SAME DAY COUNTS AS 1 VISIT, AUTH REQUIRED AFTER 12TH VISIT THROUGH [employer] PORTAL OR FAX TO [phone]          Overview of Evaluation dated 1/15/25  Patient is a 49 year old male referred to the Lymphedema Clinic with a diagnosis of bilateral Lower Extremity Lymphedema.  Pt. States he was visiting in [place]  till 2024 and noticed swelling in BLE L>R, change of color purple on BLE LLE> RLE.Pt. Was seen by Podiatrist   and given 20-30mmhg knee high compression socks. Pt. Wears them during the night and has not been wearing them since Oct/Nov since the swelling has stopped.   Currently BMI 44.94kg/m2, 340lbs. Pt.'s baseline weight 190lbs  Pt. States On Abilify 5mg once/daily causing weight gain .  Pt will be seen in clinic for intervention to address their symptoms including BLE appropriate compression to reduce swelling to more manageable state to increase ease & safety with ADL/mobility, with eventual transition to home

## 2025-01-23 ENCOUNTER — TELEPHONE (OUTPATIENT)
Dept: FAMILY MEDICINE CLINIC | Age: 50
End: 2025-01-23

## 2025-01-23 NOTE — TELEPHONE ENCOUNTER
Patient states he had an appointment in February that was cancelled due to it was for a PE and he had an AWV in Jan. Katie is telling him that it does not count as a PE and in order for him to get the 35.00 he would need to schedule a PE. Can we reschedule the cancelled PE appt?

## 2025-01-23 NOTE — TELEPHONE ENCOUNTER
The Medicare wellness is his physical we discussed this every single time he is here he does not need a physical he had a Medicare wellness already this month

## 2025-01-24 ENCOUNTER — HOSPITAL ENCOUNTER (OUTPATIENT)
Age: 50
Setting detail: SPECIMEN
Discharge: HOME | End: 2025-01-24

## 2025-01-24 DIAGNOSIS — E66.01 MORBID OBESITY DUE TO EXCESS CALORIES: ICD-10-CM

## 2025-01-24 LAB
ALBUMIN SERPL-MCNC: 4.1 G/DL (ref 3.5–5.2)
ALBUMIN/GLOB SERPL: 1.3 {RATIO} (ref 1–2.5)
ALP SERPL-CCNC: 80 U/L (ref 40–129)
ALT SERPL-CCNC: 18 U/L (ref 10–50)
ANION GAP SERPL CALCULATED.3IONS-SCNC: 11 MMOL/L (ref 9–16)
AST SERPL-CCNC: 21 U/L (ref 10–50)
BASOPHILS # BLD: 0.05 K/UL (ref 0–0.2)
BASOPHILS NFR BLD: 1 % (ref 0–2)
BILIRUB SERPL-MCNC: 0.4 MG/DL (ref 0–1.2)
BUN SERPL-MCNC: 12 MG/DL (ref 6–20)
CALCIUM SERPL-MCNC: 9.3 MG/DL (ref 8.6–10.4)
CHLORIDE SERPL-SCNC: 102 MMOL/L (ref 98–107)
CHOLEST SERPL-MCNC: 144 MG/DL (ref 0–199)
CHOLESTEROL/HDL RATIO: 3.6
CO2 SERPL-SCNC: 27 MMOL/L (ref 20–31)
CREAT SERPL-MCNC: 0.9 MG/DL (ref 0.7–1.2)
EOSINOPHIL # BLD: 0.26 K/UL (ref 0–0.44)
EOSINOPHILS RELATIVE PERCENT: 3 % (ref 1–4)
ERYTHROCYTE [DISTWIDTH] IN BLOOD BY AUTOMATED COUNT: 13.2 % (ref 11.8–14.4)
GFR, ESTIMATED: >90 ML/MIN/1.73M2
GLUCOSE SERPL-MCNC: 89 MG/DL (ref 74–99)
HCT VFR BLD AUTO: 47.2 % (ref 40.7–50.3)
HDLC SERPL-MCNC: 40 MG/DL
HGB BLD-MCNC: 15.4 G/DL (ref 13–17)
IMM GRANULOCYTES # BLD AUTO: 0.04 K/UL (ref 0–0.3)
IMM GRANULOCYTES NFR BLD: 0 %
LDLC SERPL CALC-MCNC: 83 MG/DL (ref 0–100)
LYMPHOCYTES NFR BLD: 3.28 K/UL (ref 1.1–3.7)
LYMPHOCYTES RELATIVE PERCENT: 35 % (ref 24–43)
MCH RBC QN AUTO: 30.1 PG (ref 25.2–33.5)
MCHC RBC AUTO-ENTMCNC: 32.6 G/DL (ref 28.4–34.8)
MCV RBC AUTO: 92.2 FL (ref 82.6–102.9)
MONOCYTES NFR BLD: 0.76 K/UL (ref 0.1–1.2)
MONOCYTES NFR BLD: 8 % (ref 3–12)
NEUTROPHILS NFR BLD: 53 % (ref 36–65)
NEUTS SEG NFR BLD: 4.91 K/UL (ref 1.5–8.1)
NRBC BLD-RTO: 0 PER 100 WBC
PLATELET # BLD AUTO: 203 K/UL (ref 138–453)
PMV BLD AUTO: 10.7 FL (ref 8.1–13.5)
POTASSIUM SERPL-SCNC: 4.5 MMOL/L (ref 3.7–5.3)
PROT SERPL-MCNC: 7.3 G/DL (ref 6.6–8.7)
RBC # BLD AUTO: 5.12 M/UL (ref 4.21–5.77)
SODIUM SERPL-SCNC: 140 MMOL/L (ref 136–145)
TRIGL SERPL-MCNC: 106 MG/DL (ref 0–149)
TSH SERPL DL<=0.05 MIU/L-ACNC: 1.71 UIU/ML (ref 0.27–4.2)
VLDLC SERPL CALC-MCNC: 21 MG/DL (ref 1–30)
WBC OTHER # BLD: 9.3 K/UL (ref 3.5–11.3)

## 2025-01-27 ENCOUNTER — TELEPHONE (OUTPATIENT)
Dept: FAMILY MEDICINE CLINIC | Age: 50
End: 2025-01-27

## 2025-01-27 NOTE — TELEPHONE ENCOUNTER
Patient would like an order for A1C to be done in July. He received letter in the mail from his insurance company for a $25 gift card. Last one was done in June of last year.Does patient need an appointment? Patient aware that Evita is on vacation.

## 2025-01-29 ENCOUNTER — APPOINTMENT (OUTPATIENT)
Age: 50
End: 2025-01-29
Payer: COMMERCIAL

## 2025-02-03 ENCOUNTER — HOSPITAL ENCOUNTER (OUTPATIENT)
Age: 50
Setting detail: THERAPIES SERIES
Discharge: HOME | End: 2025-02-03
Payer: COMMERCIAL

## 2025-02-03 DIAGNOSIS — E66.01 MORBID OBESITY DUE TO EXCESS CALORIES: Primary | ICD-10-CM

## 2025-02-03 DIAGNOSIS — R73.01 IFG (IMPAIRED FASTING GLUCOSE): ICD-10-CM

## 2025-02-03 PROCEDURE — 97140 MANUAL THERAPY 1/> REGIONS: CPT

## 2025-02-03 PROCEDURE — 97535 SELF CARE MNGMENT TRAINING: CPT

## 2025-02-03 NOTE — FLOWSHEET NOTE
TREATMENT LOCATION:   [x] [facility]  Outpatient Rehabilitation &  Therapy  [address]  P: [phone]  F: [phone] [] [facility]   Outpatient Rehabilitation &  Therapy  [address]  P: [phone]  F: [phone] [] [facility]  Outpatient Rehabilitation &  Therapy  [address]   P: [phone]  F: [phone]        Lymphedema Services - Treatment Note of the Lower Extremity    Date:  2/3/2025  Patient: Jonny Westbrook  : 1975             MRN: [id number]  Referring Provider: Pietro Navarro PA-C       Phone: [phone]  Fax: [phone]  Insurance: [employer]  (ID:[id number] ) -         Medical Diagnosis:  Lymphedema [I89.0]   Rehab Codes: I89.0, I87.2 - venous insufficiency (chronic) (peripheral)  Onset Date:   Visit# / total visits: 3/5 scheduled;    BASED ON MEDICAL NECESSITY, PT/OT/ST ARE COMBINED, IF SEEN ON SAME DAY COUNTS AS 1 VISIT, AUTH REQUIRED AFTER 12TH VISIT THROUGH [employer] PORTAL OR FAX TO [phone]          Overview of Evaluation dated 1/15/25  Patient is a 49 year old male referred to the Lymphedema Clinic with a diagnosis of bilateral Lower Extremity Lymphedema.  Pt. States he was visiting in [place]  till 2024 and noticed swelling in BLE L>R, change of color purple on BLE LLE> RLE.Pt. Was seen by Podiatrist   and given 20-30mmhg knee high compression socks. Pt. Wears them during the night and has not been wearing them since Oct/Nov since the swelling has stopped.   Currently BMI 44.94kg/m2, 340lbs. Pt.'s baseline weight 190lbs  Pt. States On Abilify 5mg once/daily causing weight gain .  Pt will be seen in clinic for intervention to address their symptoms including BLE appropriate compression to reduce swelling to more manageable state to increase ease & safety with ADL/mobility, with eventual transition to home management

## 2025-02-05 ENCOUNTER — OFFICE VISIT (OUTPATIENT)
Age: 50
End: 2025-02-05

## 2025-02-05 VITALS
HEART RATE: 89 BPM | WEIGHT: 315 LBS | SYSTOLIC BLOOD PRESSURE: 133 MMHG | DIASTOLIC BLOOD PRESSURE: 81 MMHG | HEIGHT: 74 IN | OXYGEN SATURATION: 96 % | BODY MASS INDEX: 40.43 KG/M2 | TEMPERATURE: 98 F

## 2025-02-05 DIAGNOSIS — D48.5 NEOPLASM OF UNCERTAIN BEHAVIOR OF SKIN: Primary | ICD-10-CM

## 2025-02-05 DIAGNOSIS — L85.3 XEROSIS CUTIS: ICD-10-CM

## 2025-02-05 DIAGNOSIS — I87.2 STASIS DERMATITIS OF BOTH LEGS: ICD-10-CM

## 2025-02-05 RX ORDER — TERBINAFINE HYDROCHLORIDE 250 MG/1
TABLET ORAL
COMMUNITY
Start: 2025-01-31

## 2025-02-05 RX ORDER — TRIAMCINOLONE ACETONIDE 1 MG/G
CREAM TOPICAL
Qty: 454 G | Refills: 2 | Status: SHIPPED | OUTPATIENT
Start: 2025-02-05

## 2025-02-05 NOTE — PROGRESS NOTES
Dermatology Patient Note  [facility] PHYSICIANS [facility]  [facility] DERMATOLOGY  [address]  [place] [address]  Dept: [phone]  Dept Fax: [phone]      VISITDATE: 2/5/2025   REFERRING PROVIDER: No ref. provider found      Jonny Westbrook is a 49 y.o. male  who presents today in the office for:    Other (Patient presents to the office for a stasis derm- no longer using triamcinolone cream as his legs are improving. Patient states his rosacea is slightly improving but still having issues on the top of his nose, and has not been using the topical ivermectin due to being sick. Using clobetasol cream for his atopic derm on michael hands-soothes the skin on his hands, but states they are not fully healing. LV-09/24/2024. Pt has questions about his stasis derm, lesion on right forearm-sore x3 months)      HISTORY OF PRESENT ILLNESS:  As above.  Pt states that the lesion on the right forearm is tender to pressure.  Pt also states that he has extreme hand dryness, and fissuring/bleeding, which is worse in the Winter months.  Pt admits to very frequent hand washing.  Pt notes that lower legs no longer itch.  He is concerned with the hyperpigmentation.  Pt states that he is wearing a compression \"wrap\" on his lower legs.    MEDICAL PROBLEMS:  Patient Active Problem List    Diagnosis Date Noted    Venous stasis dermatitis of both lower extremities 10/16/2024    BPH without urinary obstruction 06/10/2024    Hypogonadism in male 10/25/2023    Microhematuria 09/25/2023    Recurrent UTI (urinary tract infection) 07/17/2023    Nocturia 07/17/2023    IFG (impaired fasting glucose) 01/19/2022    Smoker 12/21/2020    Schizophrenia (HCC) 08/24/2018    Homicidal ideation 08/22/2018    Morbid obesity due to excess calories 03/27/2017       CURRENT MEDICATIONS:   Current Outpatient Medications   Medication Sig Dispense Refill    terbinafine (LAMISIL) 250 MG tablet       triamcinolone (KENALOG) 0.1 % cream Apply to

## 2025-02-06 ENCOUNTER — LAB (OUTPATIENT)
Dept: LAB | Age: 50
End: 2025-02-06

## 2025-02-10 NOTE — RESULT ENCOUNTER NOTE
The lesion we biopsied is a common form of skin cancer called a squamous cell carcinoma. It can be removed and cured by excision, a simple in-office procedure with local anesthesia. It will take no more than an hour. After the procedure, you should avoid heavy lifting, pushing or pulling, or strenuous exercise for 2 weeks. You may have stiches that need to come out in 2 weeks or may have dissolving stitches, which will be determined during the procedure.     Staff - please call patient to inform of above and schedule 1 hour procedure.

## 2025-02-18 ENCOUNTER — HOSPITAL ENCOUNTER (OUTPATIENT)
Age: 50
Setting detail: THERAPIES SERIES
Discharge: HOME | End: 2025-02-18
Payer: COMMERCIAL

## 2025-02-18 PROCEDURE — 97535 SELF CARE MNGMENT TRAINING: CPT

## 2025-02-18 PROCEDURE — 97140 MANUAL THERAPY 1/> REGIONS: CPT

## 2025-02-18 NOTE — CASE COMMUNICATION
Lymphedema Self Massage Using Massage Roller    Complete 10 deep breaths    Gently massage trunk with medium speed back and forth movements of roller covering front, sides, and back of ENTIRE trunk.  If you are unable to reach every area, massage as far as you able able to reach.  Complete for 10 minutes.    Using the same gentle back and forth technique, massage your affected extremity/extremities.  Start with hip/shoulder and work away from the body.  For arms: work from shoulder to elbow to hand for 5 to 10 minutes per arm.  For legs: work from hips to knees to feet for 5 to 10 minutes per leg    Spend extra time in more swollen areas of trunk or extremities.  This will allow more fluid to move out of those areas.    If able, massage directly on the skin for best results.    Massage all extremities, even if only 1 or 2 areas are affected.  This will promote good overall lymphatic circulations.    Complete massage at least once daily.  You can do this technique as often as you like, but a minimum of once daily will be beneficial.    You can purchase the deep tissue massage roller at any sporting [employer] store (like [employer]), in a sporting good department at box stores (like [employer], [employer], [employer], etc), or online (like Amazon).    For online video demonstrations of this technique, go to:  [email or web address]  In the search bar, type: Judith Cruz - My Lymphedema Life  Click on her page, on the options on the left side, click on \"videos\"  Choose video named \"Demo For Rolling to Help Your Lymphedema\"

## 2025-02-18 NOTE — FLOWSHEET NOTE
garments/devices to reduce the risk of infection, progression of disease, exacerbations, and functional morbidity with use of BLE during ADL.     Pt to be compliant with CDT in order to reduce edema in the BLE by 1+ cm total per limb for increased ease and independence with lower body ADL tasks.      Pt will demonstrate independence with home programming including vasopneumatic pump, compression garments, skin care,  for improved QOL while managing chronic lymphatic impairment.          Plan:   [x] Continue current frequency toward long and short term goals.        Treatment Charges   Minutes   Units Time In/Out   Re-evaluation (95505)               $65.97/$49.70                                                           Manual Therapy (97707):                                      $25.94/ $20.32 45 3    Therapeutic activities (11398):                             $35.10/ $25.06      Therapeutic Exercise (72068)                              $28.13/$ 21.75      Self care/home mgmt (66342)                              $31.17/ $23.27 15 1    Vasopneumatic Device (73131)                           $8.79      Total Billable Time    60 4              Time In:  1500  Time Out: 1600      Electronically signed by Sanjana Riley OT on 2/18/2025 at 3:01 PM

## 2025-02-18 NOTE — CARE COORDINATION
TREATMENT LOCATION:   [x] [facility]  Outpatient Rehabilitation &  Therapy  [address], [address]  P: [phone]  F: [phone]   Lymphedema Services - Treatment Note of the Lower Extremity     Date:  2025  Patient: Jonny Westbrook                 : 1975                      MRN: [id number]  Referring Provider: Pietro Navarro PA-C       Phone: [phone]                    Fax: [phone]  Insurance: [employer] DUAL  (ID:[id number] ) -         Medical Diagnosis:  Lymphedema [I89.0]   Rehab Codes: I89.0, I87.2 - venous insufficiency (chronic) (peripheral)  Onset Date:                    Medical Necessity For Vasopneumatic Pump  Pt presents with Secondary Lymphedema (i89.0) Stage 2 and  Hyperpigmentation,Hemosiderin Staining . Over the last 4+ weeks the patient has tried elevation, exercise, and 20-30mmHg compression. Pt has also been taking medications as prescribed monitoring a (low salt) diet and, is completing daily skin care by washing the legs and applying low pH lotion as able. Despite regular compliance with these modalities the pt continues to present with chronic and persistent lymphedema of the B LE. I am recommending pt receive a vasopneumatic pump to better manage lymphatic fluids and help move it past the point of standard compression garments for an overall improvement in quality of life  Circumferential Measurements :   Measurements taken from nail base of D2 digit.  Measurements (cm) cm Right  25 Left  25   Dorsum    9 25cm 25cm   Inframalleolar  (Y girth)     17 39.5 38   Supramalleolar  (ankle)   20 28.4 28   Distal Calf    29 34.2 32   Mid Calf    43 47.4 47   Proximal Calf   55 43 43.5   Knee            Distal thigh           Mid thigh         Proximal Thigh                     
from nail base of D2 digit.  Measurements (cm) cm Right  2/18/25 Left  2/18/25   Dorsum    9 25cm 25cm   Inframalleolar  (Y girth)     17 39.5 38   Supramalleolar  (ankle)   20 28.4 28   Distal Calf    29 34.2 32   Mid Calf    43 47.4 47   Proximal Calf   55 43 43.5   Knee            Distal thigh           Mid thigh         Proximal Thigh         Initial Total :  NA

## 2025-02-24 ENCOUNTER — OFFICE VISIT (OUTPATIENT)
Dept: PODIATRY | Age: 50
End: 2025-02-24
Payer: COMMERCIAL

## 2025-02-24 VITALS — HEIGHT: 74 IN | WEIGHT: 315 LBS | BODY MASS INDEX: 40.43 KG/M2

## 2025-02-24 DIAGNOSIS — M79.604 BILATERAL LOWER EXTREMITY PAIN: ICD-10-CM

## 2025-02-24 DIAGNOSIS — L30.9 DERMATITIS: ICD-10-CM

## 2025-02-24 DIAGNOSIS — M79.605 BILATERAL LOWER EXTREMITY PAIN: ICD-10-CM

## 2025-02-24 DIAGNOSIS — L85.3 XEROSIS CUTIS: ICD-10-CM

## 2025-02-24 DIAGNOSIS — R60.0 EDEMA OF LOWER EXTREMITY: ICD-10-CM

## 2025-02-24 DIAGNOSIS — R26.2 DIFFICULTY WALKING: ICD-10-CM

## 2025-02-24 DIAGNOSIS — B35.1 DERMATOPHYTOSIS OF NAIL: Primary | ICD-10-CM

## 2025-02-24 DIAGNOSIS — L60.0 INGROWN NAIL: ICD-10-CM

## 2025-02-24 PROCEDURE — 99213 OFFICE O/P EST LOW 20 MIN: CPT | Performed by: PODIATRIST

## 2025-02-24 PROCEDURE — 3017F COLORECTAL CA SCREEN DOC REV: CPT | Performed by: PODIATRIST

## 2025-02-24 PROCEDURE — 11721 DEBRIDE NAIL 6 OR MORE: CPT | Performed by: PODIATRIST

## 2025-02-24 PROCEDURE — 4004F PT TOBACCO SCREEN RCVD TLK: CPT | Performed by: PODIATRIST

## 2025-02-24 PROCEDURE — G8427 DOCREV CUR MEDS BY ELIG CLIN: HCPCS | Performed by: PODIATRIST

## 2025-02-24 PROCEDURE — G8417 CALC BMI ABV UP PARAM F/U: HCPCS | Performed by: PODIATRIST

## 2025-02-24 RX ORDER — UREA 40 %
CREAM (GRAM) TOPICAL
Qty: 120 G | Refills: 2 | Status: SHIPPED | OUTPATIENT
Start: 2025-02-24

## 2025-03-03 ENCOUNTER — TELEPHONE (OUTPATIENT)
Dept: PODIATRY | Age: 50
End: 2025-03-03

## 2025-03-03 RX ORDER — AMMONIUM LACTATE 12 G/100G
LOTION TOPICAL
Qty: 225 G | Refills: 2 | Status: SHIPPED | OUTPATIENT
Start: 2025-03-03

## 2025-03-16 ENCOUNTER — APPOINTMENT (OUTPATIENT)
Dept: GENERAL RADIOLOGY | Age: 50
End: 2025-03-16
Payer: COMMERCIAL

## 2025-03-16 ENCOUNTER — HOSPITAL ENCOUNTER (EMERGENCY)
Age: 50
Discharge: HOME | End: 2025-03-16
Attending: EMERGENCY MEDICINE
Payer: COMMERCIAL

## 2025-03-16 VITALS
DIASTOLIC BLOOD PRESSURE: 85 MMHG | WEIGHT: 315 LBS | HEART RATE: 80 BPM | HEIGHT: 74 IN | SYSTOLIC BLOOD PRESSURE: 145 MMHG | RESPIRATION RATE: 14 BRPM | OXYGEN SATURATION: 94 % | TEMPERATURE: 98.3 F | BODY MASS INDEX: 40.43 KG/M2

## 2025-03-16 DIAGNOSIS — S16.1XXA STRAIN OF NECK MUSCLE, INITIAL ENCOUNTER: Primary | ICD-10-CM

## 2025-03-16 PROCEDURE — 72040 X-RAY EXAM NECK SPINE 2-3 VW: CPT

## 2025-03-16 PROCEDURE — 73010 X-RAY EXAM OF SHOULDER BLADE: CPT

## 2025-03-16 PROCEDURE — 96372 THER/PROPH/DIAG INJ SC/IM: CPT

## 2025-03-16 PROCEDURE — 6360000002 HC RX W HCPCS: Performed by: EMERGENCY MEDICINE

## 2025-03-16 PROCEDURE — 99284 EMERGENCY DEPT VISIT MOD MDM: CPT

## 2025-03-16 PROCEDURE — 6370000000 HC RX 637 (ALT 250 FOR IP): Performed by: EMERGENCY MEDICINE

## 2025-03-16 RX ORDER — KETOROLAC TROMETHAMINE 30 MG/ML
60 INJECTION, SOLUTION INTRAMUSCULAR; INTRAVENOUS ONCE
Status: COMPLETED | OUTPATIENT
Start: 2025-03-16 | End: 2025-03-16

## 2025-03-16 RX ORDER — CYCLOBENZAPRINE HCL 10 MG
10 TABLET ORAL 2 TIMES DAILY PRN
Qty: 25 TABLET | Refills: 0 | Status: SHIPPED | OUTPATIENT
Start: 2025-03-16 | End: 2025-03-29

## 2025-03-16 RX ORDER — CYCLOBENZAPRINE HCL 10 MG
10 TABLET ORAL ONCE
Status: COMPLETED | OUTPATIENT
Start: 2025-03-16 | End: 2025-03-16

## 2025-03-16 RX ORDER — IBUPROFEN 800 MG/1
800 TABLET, FILM COATED ORAL 2 TIMES DAILY PRN
Qty: 25 TABLET | Refills: 1 | Status: SHIPPED | OUTPATIENT
Start: 2025-03-16

## 2025-03-16 RX ORDER — CYCLOBENZAPRINE HCL 10 MG
10 TABLET ORAL 2 TIMES DAILY PRN
Qty: 25 TABLET | Refills: 0 | Status: SHIPPED | OUTPATIENT
Start: 2025-03-16 | End: 2025-03-16

## 2025-03-16 RX ADMIN — KETOROLAC TROMETHAMINE 60 MG: 60 INJECTION, SOLUTION INTRAMUSCULAR at 17:01

## 2025-03-16 RX ADMIN — CYCLOBENZAPRINE 10 MG: 10 TABLET, FILM COATED ORAL at 17:00

## 2025-03-16 ASSESSMENT — PAIN DESCRIPTION - DESCRIPTORS
DESCRIPTORS: SHARP
DESCRIPTORS: SHARP;SHOOTING

## 2025-03-16 ASSESSMENT — PAIN SCALES - GENERAL
PAINLEVEL_OUTOF10: 4

## 2025-03-16 ASSESSMENT — PAIN DESCRIPTION - PAIN TYPE: TYPE: ACUTE PAIN

## 2025-03-16 ASSESSMENT — PAIN DESCRIPTION - ORIENTATION
ORIENTATION: RIGHT
ORIENTATION: RIGHT

## 2025-03-16 ASSESSMENT — PAIN DESCRIPTION - LOCATION
LOCATION: SHOULDER;ELBOW;HAND
LOCATION: SHOULDER;ELBOW;HAND

## 2025-03-16 ASSESSMENT — PAIN DESCRIPTION - FREQUENCY: FREQUENCY: CONTINUOUS

## 2025-03-16 ASSESSMENT — PAIN - FUNCTIONAL ASSESSMENT: PAIN_FUNCTIONAL_ASSESSMENT: 0-10

## 2025-03-16 NOTE — ED NOTES
Pt presents to ER from home due to neck and arm pain. Pt states he cracked his neck causing pain to shoot down his RUE. Pt states he had difficulty using his RUE causing him to lose the ability to lift and  items. Upon examination patient is able to move RUE, Radial pulse is moderate (+2), Capillary refill less than 3 seconds.Pt is A/O x 4, equal chest expansion with non labored breathing, wheels locked , Bed in lowest position, call light in reach.

## 2025-03-16 NOTE — ED PROVIDER NOTES
J[place]  [facility] [address]  [phone]            Jessica Marmolejo MD        This note was created with the assistance of a speech-recognition program. While intending to generate a document that actually reflects the content of the visit, no guarantees can be provided that every mistake has been identified and corrected by editing.        Jessica Marmolejo MD  03/16/25 6616

## 2025-03-16 NOTE — ED TRIAGE NOTES
Here today with right posterior shoulder pain, that radiates into right elbow and down to right hand. Pt noted weakness in right arm today, when attempting to do malcolm care at home and having difficulty gripping the soap pump and dispensing soap. Three days ago, \"I cracked my neck like a chiropractor does, moving my head side to side. Since then noted above pain. Initially medicated with Motrin 900mg x3 doses. Today took Tylenol at 1400. Has been going to physical therapy, receiving ultrasound, massage, pressure points, exercises. \"I feel like my posture has changed in the past six days. I've been going to PT for something other than this shoulder pain.\" \"I can't use my cigarette lighter with my right hand.\" Denies headache, chest pain, SOB, dyspnea, dizziness, lightheadedness, nor body aches. No one ill at patient's home. Speech clear, appropriate. Follows commands and answers questions correctly. Good historian.    Dr Marmolejo in to see pt.

## 2025-03-18 ENCOUNTER — OFFICE VISIT (OUTPATIENT)
Dept: ORTHOPEDIC SURGERY | Age: 50
End: 2025-03-18
Payer: COMMERCIAL

## 2025-03-18 VITALS — BODY MASS INDEX: 40.43 KG/M2 | HEIGHT: 74 IN | RESPIRATION RATE: 19 BRPM | WEIGHT: 315 LBS

## 2025-03-18 DIAGNOSIS — M54.2 NECK PAIN: Primary | ICD-10-CM

## 2025-03-18 DIAGNOSIS — M54.12 CERVICAL RADICULOPATHY: ICD-10-CM

## 2025-03-18 DIAGNOSIS — M48.10 DISH (DIFFUSE IDIOPATHIC SKELETAL HYPEROSTOSIS): ICD-10-CM

## 2025-03-18 PROCEDURE — G8417 CALC BMI ABV UP PARAM F/U: HCPCS | Performed by: ORTHOPAEDIC SURGERY

## 2025-03-18 PROCEDURE — 3017F COLORECTAL CA SCREEN DOC REV: CPT | Performed by: ORTHOPAEDIC SURGERY

## 2025-03-18 PROCEDURE — 4004F PT TOBACCO SCREEN RCVD TLK: CPT | Performed by: ORTHOPAEDIC SURGERY

## 2025-03-18 PROCEDURE — G8428 CUR MEDS NOT DOCUMENT: HCPCS | Performed by: ORTHOPAEDIC SURGERY

## 2025-03-18 PROCEDURE — 99203 OFFICE O/P NEW LOW 30 MIN: CPT | Performed by: ORTHOPAEDIC SURGERY

## 2025-03-18 NOTE — PROGRESS NOTES
Patient ID: Jonny Westbrook is a 49 y.o. male    Chief Compliant:  No chief complaint on file.       Diagnostic imaging:    AP lateral cervical spine advanced anterior osteophyte formation throughout the cervical spine dissipates height is well-maintained    Assessment and Plan:  1. Neck pain    2. Cervical radiculopathy    3. DISH (diffuse idiopathic skeletal hyperostosis)        Neck pain with periscapular radiation and down right arm    PT    Follow up 3-4 weeks    HPI:  This is a 49 y.o. male who presents to the clinic today as a new patient for evaluation of neck and spine.     Right shoulder pain with radiation to the right elbow and down to right hand. He notes specifically the palm and top of the hand. He describes his pain as a shooting pain.     Patient recently reported to [facility] ED, 03/16/25, with with right posterior shoulder pain, that radiates into right elbow and down to right hand. He states this onset after he tried cracking his neck like the chiropractor does.    He was given Flexeril and Ibuprofen at the ER and takes it PRN.    Review of Systems   All other systems reviewed and are negative.      Past History:    Current Outpatient Medications:     cyclobenzaprine (FLEXERIL) 10 MG tablet, Take 1 tablet by mouth 2 times daily as needed for Muscle spasms, Disp: 25 tablet, Rfl: 0    ibuprofen (ADVIL;MOTRIN) 800 MG tablet, Take 1 tablet by mouth 2 times daily as needed for Pain, Disp: 25 tablet, Rfl: 1    ammonium lactate (LAC-HYDRIN) 12 % lotion, Apply topically once daily, Disp: 225 g, Rfl: 2    Urea (CARMOL) 40 % cream, Apply to affected area once daily, Disp: 120 g, Rfl: 2    terbinafine (LAMISIL) 250 MG tablet, , Disp: , Rfl:     triamcinolone (KENALOG) 0.1 % cream, Apply to affected areas twice daily, as needed, for itching (not face, armpit or groin)., Disp: 454 g, Rfl: 2    Nutritional Supplements (ENSURE NUTRITION SHAKE) LIQD, Take 2 Bottles by mouth daily (NUTRITIONAL DEFICIENCY) CHOCOLATE

## 2025-03-23 ENCOUNTER — HOSPITAL ENCOUNTER (EMERGENCY)
Age: 50
Discharge: HOME | End: 2025-03-23
Attending: EMERGENCY MEDICINE
Payer: COMMERCIAL

## 2025-03-23 VITALS
HEART RATE: 99 BPM | DIASTOLIC BLOOD PRESSURE: 76 MMHG | SYSTOLIC BLOOD PRESSURE: 118 MMHG | WEIGHT: 315 LBS | TEMPERATURE: 98.6 F | BODY MASS INDEX: 40.43 KG/M2 | HEIGHT: 74 IN | RESPIRATION RATE: 21 BRPM | OXYGEN SATURATION: 97 %

## 2025-03-23 DIAGNOSIS — H11.31 SUBCONJUNCTIVAL HEMORRHAGE OF RIGHT EYE: Primary | ICD-10-CM

## 2025-03-23 PROCEDURE — 99282 EMERGENCY DEPT VISIT SF MDM: CPT | Performed by: EMERGENCY MEDICINE

## 2025-03-23 ASSESSMENT — PAIN - FUNCTIONAL ASSESSMENT: PAIN_FUNCTIONAL_ASSESSMENT: NONE - DENIES PAIN

## 2025-03-23 NOTE — ED PROVIDER NOTES
[facility] EMERGENCY DEPARTMENT  Emergency Department Encounter  Emergency Medicine Resident     Pt Name:Jonny Westbrook  MRN: [id number]  Birthdate 1975  Date of evaluation: 3/23/25  PCP:  Evita Mirza APRN - CNP  Note Started: 2:01 PM EDT      CHIEF COMPLAINT       Chief Complaint   Patient presents with    Eye Problem     right       HISTORY OF PRESENT ILLNESS  (Location/Symptom, Timing/Onset, Context/Setting, Quality, Duration, Modifying Factors, Severity.)      Jonny Westbrook is a 49 y.o. male who presents with blood in the right eye, he noticed yesterday, according to him, he is growing almost covering half of the eye, he denies pain, fever, nausea, vomiting, fall, headache, focal weakness.  He said 2 days ago he was smoking and there was fast wind, some thalia might have got into his eye, he did not feel any pain immediately, he is used to scratch his eye a lot, he is not sure if it is because of that.     PAST MEDICAL / SURGICAL / SOCIAL / FAMILY HISTORY      has a past medical history of Arthritis, Caffeine use, Depression, Hypertriglyceridemia, Obesity, and Schizophrenia (HCC).     has a past surgical history that includes Tonsillectomy.      Social History     Socioeconomic History    Marital status: Single     Spouse name: Not on file    Number of children: Not on file    Years of education: Not on file    Highest education level: Not on file   Occupational History    Not on file   Tobacco Use    Smoking status: Every Day     Current packs/day: 1.00     Average packs/day: 1 pack/day for 33.2 years (33.2 ttl pk-yrs)     Types: Cigarettes     Start date: 1/1/1992    Smokeless tobacco: Never   Vaping Use    Vaping status: Never Used   Substance and Sexual Activity    Alcohol use: No    Drug use: No    Sexual activity: Not Currently   Other Topics Concern    Not on file   Social History Narrative    Not on file     Social Drivers of Health     Financial Resource Strain: Low Risk  (7/8/2024)    Overall

## 2025-03-23 NOTE — DISCHARGE INSTRUCTIONS
You are seen today for blood in the right eye, it is a conjunctival hemorrhage, no vision changes, no focal weakness, no headache or eye pain.    You are not given any prescription for new medicine.    This is a benign condition, blood will be gradually reabsorbing, it should be resolving within couple of weeks.    Schedule appointment with your primary care physician for further follow-up.    You are given instructions for ophthalmology, for scheduling appointment.    If you develop headache, pain in your right eye, fever, nausea, vomiting, discharge from her right eye, blurring of vision from the right eye, focal weakness, swelling around the eye, come to emergency department

## 2025-03-23 NOTE — ED PROVIDER NOTES
[facility]     Emergency Department     Faculty Attestation    I performed a history and physical examination of the patient and discussed management with the resident. I have reviewed and agree with the resident’s findings including all diagnostic interpretations, and treatment plans as written at the time of my review. Any areas of disagreement are noted on the chart. I was personally present for the key portions of any procedures. I have documented in the chart those procedures where I was not present during the key portions. For Physician Assistant/ Nurse Practitioner cases/documentation I have personally evaluated this patient and have completed at least one if not all key elements of the E/M (history, physical exam, and MDM). Additional findings are as noted.    PtName: Jonny Westbrook  MRN: [id number]  Birthdate 1975  Date of evaluation: 3/23/25  Note Started: 2:02 PM EDT    Primary Care Physician: Evita Mirza APRN - CNP        History: This is a 49 y.o. male who presents to the Emergency Department with complaint of eye redness.  Patient presents emerged lia noticing some redness in the eye that became worse today.  He denies any visual acuities.  Patient was rubbing his eyes yesterday.  Denies any photophobia.  He denies any pain.    Physical:   height is 1.88 m (6' 2\") and weight is 154.2 kg (339 lb 15.2 oz) (abnormal). His temperature is 98.6 °F (37 °C). His blood pressure is 118/76 and his pulse is 99. His respiration is 21 and oxygen saturation is 97%.  Pupils equal round react light extraocular motions intact.  Patient has what appears to be subconjunctival hemorrhage in the lateral inferior aspect of the right eye.  No hyphema is noted.    Impression: Right eye subconjunctival hemorrhage    Plan: Discharged with ophthalmology follow-up tomorrow      Medical Decision Making  Problems Addressed:  Subconjunctival hemorrhage of right eye:

## 2025-03-24 ENCOUNTER — HOSPITAL ENCOUNTER (OUTPATIENT)
Age: 50
Setting detail: THERAPIES SERIES
Discharge: HOME | End: 2025-03-24
Payer: COMMERCIAL

## 2025-03-24 PROCEDURE — 97140 MANUAL THERAPY 1/> REGIONS: CPT

## 2025-03-24 PROCEDURE — 97535 SELF CARE MNGMENT TRAINING: CPT

## 2025-03-24 NOTE — CARE COORDINATION
TREATMENT LOCATION:   [x] [facility]  Outpatient Rehabilitation &  Therapy  [address], [address]  P: [phone]  F: [phone]   Lymphedema Services - Treatment Note of the Lower Extremity     Date:  2025  Patient: Jonny Westbrook                 : 1975                      MRN: [id number]  Referring Provider: Pietro Navarro PA-C       Phone: [phone]                    Fax: [phone]  Insurance: [employer] DUAL  (ID:[id number] ) -         Medical Diagnosis:  Lymphedema [I89.0]   Rehab Codes: I89.0, I87.2 - venous insufficiency (chronic) (peripheral)  Onset Date:                    Medical Necessity For Vasopneumatic Pump  Pt presents with Secondary Lymphedema (i89.0) Stage 2 and  Hyperpigmentation,Hemosiderin Staining . Over the last 4+ weeks the patient has tried elevation, exercise, and 20-30mmHg compression. Pt has also been taking medications as prescribed monitoring a (low salt) diet and, is completing daily skin care by washing the legs and applying low pH lotion as able. Despite regular compliance with these modalities the pt continues to present with chronic and persistent lymphedema of the B LE. I am recommending pt receive a vasopneumatic pump to better manage lymphatic fluids and help move it past the point of standard compression garments for an overall improvement in quality of life  Circumferential Measurements :   Measurements taken from nail base of D2 digit.  Measurements (cm) cm Right  25 Left  25   Dorsum    9 25cm 25cm   Inframalleolar  (Y girth)     17 39.5 38   Supramalleolar  (ankle)   20 28.4 28   Distal Calf    29 34.2 32   Mid Calf    43 47.4 47   Proximal Calf   55 43 43.5   Knee            Distal thigh           Mid thigh         Proximal Thigh

## 2025-03-24 NOTE — FLOWSHEET NOTE
visits    Pt will be educated on lymphedema risk reduction practices to reduce the risk of infection, progression of disease, exacerbations, and functional morbidity with use of BLE during functional mobility.     Pt will demonstrate compliance with skin care routine for improved overall skin integrity to decrease risk of wounds, infection, and hospitalization.      Pt will demonstrate independence with decongestive exercise program in order to promote healthy lymphatic flow by maintaining/improving functional ROM needed during ADL's.    Pt will demonstrate 100% accuracy with SELF-MLD sequence in order to promote independence with home programming to functional reduce swelling to BLE for increased ease and independence with ADL.     Pt/caregiver will demonstrate proper technique with bandaging or appropriate daytime compression garment compliance to aid in reduction of swelling for active participation in pt's POC.     LTG - To be addressed within 4 visits  Pt/Caregiver will demonstrate independence with donning/doffing and wearing schedule for compression garments/devices to reduce the risk of infection, progression of disease, exacerbations, and functional morbidity with use of BLE during ADL.     Pt to be compliant with CDT in order to reduce edema in the BLE by 1+ cm total per limb for increased ease and independence with lower body ADL tasks.      Pt will demonstrate independence with home programming including vasopneumatic pump, compression garments, skin care,  for improved QOL while managing chronic lymphatic impairment.          Plan:   [x] Continue current frequency toward long and short term goals.        Treatment Charges   Minutes   Units Time In/Out   Re-evaluation (63421)               $65.97/$49.70                                                           Manual Therapy (15891):                                      $25.94/ $20.32 45 3    Therapeutic activities (30729):

## 2025-04-10 ENCOUNTER — OFFICE VISIT (OUTPATIENT)
Age: 50
End: 2025-04-10

## 2025-04-10 VITALS
HEART RATE: 93 BPM | OXYGEN SATURATION: 98 % | HEIGHT: 74 IN | DIASTOLIC BLOOD PRESSURE: 78 MMHG | SYSTOLIC BLOOD PRESSURE: 112 MMHG | RESPIRATION RATE: 18 BRPM | BODY MASS INDEX: 40.43 KG/M2 | WEIGHT: 315 LBS | TEMPERATURE: 98.9 F

## 2025-04-10 DIAGNOSIS — J20.9 ACUTE BRONCHITIS, UNSPECIFIED ORGANISM: Primary | ICD-10-CM

## 2025-04-10 DIAGNOSIS — J01.00 ACUTE NON-RECURRENT MAXILLARY SINUSITIS: ICD-10-CM

## 2025-04-10 RX ORDER — BENZONATATE 100 MG/1
100-200 CAPSULE ORAL 3 TIMES DAILY PRN
Qty: 60 CAPSULE | Refills: 0 | Status: SHIPPED | OUTPATIENT
Start: 2025-04-10

## 2025-04-10 RX ORDER — METHYLPREDNISOLONE 4 MG/1
TABLET ORAL
Qty: 1 KIT | Refills: 0 | Status: SHIPPED | OUTPATIENT
Start: 2025-04-10 | End: 2025-04-16

## 2025-04-10 ASSESSMENT — ENCOUNTER SYMPTOMS
SORE THROAT: 0
DIARRHEA: 0
COUGH: 1
HEMOPTYSIS: 0
WHEEZING: 0
RHINORRHEA: 1
VOMITING: 0

## 2025-04-10 NOTE — PATIENT INSTRUCTIONS
You have been diagnosed with an acute Upper Respiratory Infection (URI). Begin taking medications as prescribed. In addition, use supportive management to treat symptoms including humidified air, ensuring oral hydration, rest, and nasal saline for relief of congestion. Return to clinic if symptoms fail to improve or worsen. Seek immediate medical attention If symptoms develop include new onset fever, worsening cough, shortness of breath, chest pain, or new concerns arise.

## 2025-04-10 NOTE — PROGRESS NOTES
tenderness.   Lymphadenopathy:      Cervical: No cervical adenopathy.           Assessment and Plan     No results found for this visit on 04/10/25.     Diagnosis Orders   1. Acute bronchitis, unspecified organism  amoxicillin-clavulanate (AUGMENTIN) 875-125 MG per tablet    methylPREDNISolone (MEDROL DOSEPACK) 4 MG tablet    benzonatate (TESSALON) 100 MG capsule      2. Acute non-recurrent maxillary sinusitis  amoxicillin-clavulanate (AUGMENTIN) 875-125 MG per tablet               Discussed exam, results, diagnosis, plan of care, and follow-up at length with patient.  Reviewed all prescribed and recommended medications, administration, and side effects. Encouraged patient to follow up with PCP or return to the clinic if symptoms fail to improve or worsen. All questions were answered. Verbalized understanding of discharge instructions.

## 2025-04-15 ENCOUNTER — OFFICE VISIT (OUTPATIENT)
Dept: ORTHOPEDIC SURGERY | Age: 50
End: 2025-04-15
Payer: COMMERCIAL

## 2025-04-15 VITALS — WEIGHT: 315 LBS | BODY MASS INDEX: 40.43 KG/M2 | HEIGHT: 74 IN | RESPIRATION RATE: 14 BRPM

## 2025-04-15 DIAGNOSIS — M54.2 NECK PAIN: Primary | ICD-10-CM

## 2025-04-15 DIAGNOSIS — E29.1 HYPOGONADISM IN MALE: ICD-10-CM

## 2025-04-15 DIAGNOSIS — M54.12 CERVICAL RADICULOPATHY: ICD-10-CM

## 2025-04-15 PROCEDURE — G8428 CUR MEDS NOT DOCUMENT: HCPCS | Performed by: ORTHOPAEDIC SURGERY

## 2025-04-15 PROCEDURE — 4004F PT TOBACCO SCREEN RCVD TLK: CPT | Performed by: ORTHOPAEDIC SURGERY

## 2025-04-15 PROCEDURE — G8417 CALC BMI ABV UP PARAM F/U: HCPCS | Performed by: ORTHOPAEDIC SURGERY

## 2025-04-15 PROCEDURE — 99213 OFFICE O/P EST LOW 20 MIN: CPT | Performed by: ORTHOPAEDIC SURGERY

## 2025-04-15 PROCEDURE — 3017F COLORECTAL CA SCREEN DOC REV: CPT | Performed by: ORTHOPAEDIC SURGERY

## 2025-04-15 NOTE — PROGRESS NOTES
TAKE 1 TAB BY MOUTH ONCE EVERY DAY, Disp: 30 tablet, Rfl: 4    vitamin C (ASCORBIC ACID) 500 MG tablet, TAKE 2 TABS BY MOUTH ONCE EVERY DAY, Disp: 60 tablet, Rfl: 4    Testosterone Undecanoate (JATENZO) 237 MG CAPS, TAKE ONE CAPSULE BY MOUTH TWICE A DAY, Disp: 60 capsule, Rfl: 5    alfuzosin (UROXATRAL) 10 MG extended release tablet, TAKE 1 TAB BY MOUTH ONCE EVERY DAY, Disp: 30 tablet, Rfl: 6    furosemide (LASIX) 20 MG tablet, Take 1 tablet by mouth daily for 5 days (Patient not taking: Reported on 11/25/2024), Disp: 5 tablet, Rfl: 0    ARIPiprazole (ABILIFY) 2 MG tablet, Take 2.5 tablets by mouth daily, Disp: , Rfl:   Allergies   Allergen Reactions    Codeine Other (See Comments)     hallucinates      Social History     Socioeconomic History    Marital status: Single     Spouse name: Not on file    Number of children: Not on file    Years of education: Not on file    Highest education level: Not on file   Occupational History    Not on file   Tobacco Use    Smoking status: Every Day     Current packs/day: 1.00     Average packs/day: 1 pack/day for 33.3 years (33.3 ttl pk-yrs)     Types: Cigarettes     Start date: 1/1/1992    Smokeless tobacco: Never   Vaping Use    Vaping status: Never Used   Substance and Sexual Activity    Alcohol use: No    Drug use: No    Sexual activity: Not Currently   Other Topics Concern    Not on file   Social History Narrative    Not on file     Social Drivers of Health     Financial Resource Strain: Low Risk  (7/8/2024)    Overall Financial Resource Strain (CARDIA)     Difficulty of Paying Living Expenses: Not hard at all   Food Insecurity: Food Insecurity Present (7/8/2024)    Hunger Vital Sign     Worried About Running Out of Food in the Last Year: Often true     Ran Out of Food in the Last Year: Never true   Transportation Needs: Unknown (7/8/2024)    PRAPARE - Transportation     Lack of Transportation (Medical): Not on file     Lack of Transportation (Non-Medical): No   Physical

## 2025-04-16 RX ORDER — TESTOSTERONE UNDECANOATE 237 MG/1
1 CAPSULE, LIQUID FILLED ORAL 2 TIMES DAILY
Qty: 60 CAPSULE | Refills: 1 | Status: SHIPPED | OUTPATIENT
Start: 2025-04-16

## 2025-04-23 ENCOUNTER — TELEPHONE (OUTPATIENT)
Age: 50
End: 2025-04-23

## 2025-04-23 NOTE — TELEPHONE ENCOUNTER
Patient wants to do PhotoDynamic Therapy only. Patient wants a Referral for Photo Dynamic Therapy.

## 2025-04-29 NOTE — TELEPHONE ENCOUNTER
Photodynamic therapy is for pre-cancerous lesions, and is not indicated to treat an invasive squamous cell carcinoma.  I will not stand behind that treatment, therefore, I will not refer him for this.  He is welcome to pursue this option, on his own, but I cannot refer him for this off label, inappropriate treatment.

## 2025-04-30 NOTE — TELEPHONE ENCOUNTER
Called and spoke to patient and informed him of your message. Patient wants to know if their is any other treatment he could do for this area that won't leave a big scar. I informed him that any trauma done to that arm even the biopsy their is always a risk with scarring, its natural for our bodies to respond that way. Patient wanted to see what you said procedure wise.     Also, if he goes through with the excision your next opening is not until the end of August

## 2025-04-30 NOTE — TELEPHONE ENCOUNTER
We do the best that we can to minimize scarring, but there will be a scar after any surgical procedure, even if it is just a thin line.

## 2025-05-14 DIAGNOSIS — E63.9 NUTRITIONAL DEFICIENCY: ICD-10-CM

## 2025-05-14 DIAGNOSIS — Z78.9 TAKES DAILY MULTIVITAMINS: ICD-10-CM

## 2025-05-14 RX ORDER — MULTIVITAMIN WITH FOLIC ACID 400 MCG
TABLET ORAL
Qty: 30 TABLET | Refills: 4 | Status: SHIPPED | OUTPATIENT
Start: 2025-05-14

## 2025-05-14 RX ORDER — ASCORBIC ACID 500 MG
TABLET ORAL
Qty: 60 TABLET | Refills: 4 | Status: SHIPPED | OUTPATIENT
Start: 2025-05-14

## 2025-05-14 RX ORDER — LACTOSE-REDUCED FOOD
LIQUID (ML) ORAL
Qty: 30 EACH | Refills: 5 | Status: SHIPPED | OUTPATIENT
Start: 2025-05-14

## 2025-05-14 NOTE — TELEPHONE ENCOUNTER
Jonny Westbrook is calling to request a refill on the following medication(s):    Medication Request:  Requested Prescriptions     Pending Prescriptions Disp Refills    Multiple Vitamin (MULTIVITAMIN) tablet [Pharmacy Med Name: TAB-A-INGE TABLET] 30 tablet 4     Sig: TAKE 1 TAB BY MOUTH ONCE EVERY DAY    vitamin C (ASCORBIC ACID) 500 MG tablet [Pharmacy Med Name: VITAMIN C 500 MG TABLET] 60 tablet 4     Sig: TAKE 2 TABS BY MOUTH ONCE EVERY DAY       Last Visit Date (If Applicable):  1/3/2025    Next Visit Date:    1/5/2026

## 2025-05-14 NOTE — TELEPHONE ENCOUNTER
Jonny Westbrook is calling to request a refill on the following medication(s):    Medication Request:  Requested Prescriptions     Pending Prescriptions Disp Refills    Nutritional Supplements (ENSURE NUTRITION SHAKE) LIQD [Pharmacy Med Name: ENSURE CHOCOLATE CAN] 30 each 5     Sig: Take 2 Bottles by mouth daily (NUTRITIONAL DEFICIENCY) CHOCOLATE ONLY       Last Visit Date (If Applicable):  1/3/2025    Next Visit Date:    1/5/2026

## 2025-05-24 ENCOUNTER — OFFICE VISIT (OUTPATIENT)
Age: 50
End: 2025-05-24

## 2025-05-24 VITALS
WEIGHT: 260 LBS | SYSTOLIC BLOOD PRESSURE: 122 MMHG | HEIGHT: 72 IN | HEART RATE: 91 BPM | RESPIRATION RATE: 18 BRPM | BODY MASS INDEX: 35.21 KG/M2 | TEMPERATURE: 98.3 F | OXYGEN SATURATION: 98 % | DIASTOLIC BLOOD PRESSURE: 69 MMHG

## 2025-05-24 DIAGNOSIS — J40 BRONCHITIS: Primary | ICD-10-CM

## 2025-05-24 DIAGNOSIS — R09.82 POSTNASAL DRIP: ICD-10-CM

## 2025-05-24 RX ORDER — FLUTICASONE PROPIONATE 50 MCG
1 SPRAY, SUSPENSION (ML) NASAL DAILY
Qty: 48 G | Refills: 1 | Status: SHIPPED | OUTPATIENT
Start: 2025-05-24

## 2025-05-24 RX ORDER — GUAIFENESIN AND DEXTROMETHORPHAN HYDROBROMIDE 600; 30 MG/1; MG/1
1 TABLET, EXTENDED RELEASE ORAL EVERY 12 HOURS PRN
Qty: 20 TABLET | Refills: 0 | Status: SHIPPED | OUTPATIENT
Start: 2025-05-24 | End: 2025-06-03

## 2025-05-24 RX ORDER — DOXYCYCLINE HYCLATE 100 MG
100 TABLET ORAL 2 TIMES DAILY
Qty: 20 TABLET | Refills: 0 | Status: SHIPPED | OUTPATIENT
Start: 2025-05-24 | End: 2025-06-03

## 2025-05-24 RX ORDER — PREDNISONE 20 MG/1
20 TABLET ORAL 2 TIMES DAILY
Qty: 10 TABLET | Refills: 0 | Status: SHIPPED | OUTPATIENT
Start: 2025-05-24 | End: 2025-05-29

## 2025-05-24 ASSESSMENT — ENCOUNTER SYMPTOMS
COUGH: 1
SORE THROAT: 1
SINUS PRESSURE: 1

## 2025-05-24 NOTE — PROGRESS NOTES
A/P:  Jonny Westbrook (: 1975) is a 49 y.o. male with The primary encounter diagnosis was Bronchitis. A diagnosis of Postnasal drip was also pertinent to this visit..    Diagnosis Orders   1. Bronchitis  doxycycline hyclate (VIBRA-TABS) 100 MG tablet    predniSONE (DELTASONE) 20 MG tablet    dextromethorphan-guaiFENesin (MUCINEX DM)  MG per extended release tablet    fluticasone (FLONASE) 50 MCG/ACT nasal spray      2. Postnasal drip        - Patient agreed to trial doxycycline, Flonase, prednisone, and Mucinex DM    -time spent with patient: 15 mins    Discharge instructions:   - Drink enough amount of fluids throughout the day to prevent dehydration  - Rest. Advance activities and diet as tolerated   - Take the prescribed medications as instructed.  - Tylenol / Motrin as needed for pain and fever and follow up with your PCP as instructed  Patient was instructed to return for re evaluation or go to the ER if symptoms  worsen, persist, or any other concerns . Patient verbalized understanding       Chief complaint(s): Cough    Jonny Westbrook (: 1975) is a 49 y.o. male, Established patient, here for evaluation of the following     Patient presents with 10 days history of productive cough, congestion, and postnasal drip.  Denies any fevers, cold sweats or chills.  Denies any nausea, vomiting or diarrhea.  Patient lives in a group home and states one of the neighbors has been sick.  Patient was sick about 3 weeks ago with similar symptoms treated with antibiotics.      History provided by:  Patient   used: No    Cough  This is a recurrent problem. The current episode started 1 to 4 weeks ago. The problem has been gradually worsening. The problem occurs constantly. Associated symptoms include ear congestion, nasal congestion, postnasal drip and a sore throat. Nothing aggravates the symptoms.        PAST MEDICAL HISTORY    Past Medical History:   Diagnosis Date    Arthritis     Caffeine

## 2025-05-27 ENCOUNTER — HOSPITAL ENCOUNTER (EMERGENCY)
Age: 50
Discharge: HOME | End: 2025-05-27
Attending: EMERGENCY MEDICINE
Payer: COMMERCIAL

## 2025-05-27 ENCOUNTER — OFFICE VISIT (OUTPATIENT)
Dept: ORTHOPEDIC SURGERY | Age: 50
End: 2025-05-27
Payer: COMMERCIAL

## 2025-05-27 VITALS
OXYGEN SATURATION: 97 % | SYSTOLIC BLOOD PRESSURE: 115 MMHG | TEMPERATURE: 98.2 F | HEART RATE: 91 BPM | DIASTOLIC BLOOD PRESSURE: 78 MMHG | RESPIRATION RATE: 18 BRPM

## 2025-05-27 VITALS — WEIGHT: 260 LBS | BODY MASS INDEX: 35.21 KG/M2 | HEIGHT: 72 IN | RESPIRATION RATE: 14 BRPM

## 2025-05-27 DIAGNOSIS — M48.10 DISH (DIFFUSE IDIOPATHIC SKELETAL HYPEROSTOSIS): ICD-10-CM

## 2025-05-27 DIAGNOSIS — H57.89 REDNESS OR DISCHARGE OF EYE: Primary | ICD-10-CM

## 2025-05-27 DIAGNOSIS — M54.2 NECK PAIN: Primary | ICD-10-CM

## 2025-05-27 DIAGNOSIS — M54.12 CERVICAL RADICULOPATHY: ICD-10-CM

## 2025-05-27 PROCEDURE — 4004F PT TOBACCO SCREEN RCVD TLK: CPT | Performed by: ORTHOPAEDIC SURGERY

## 2025-05-27 PROCEDURE — 99213 OFFICE O/P EST LOW 20 MIN: CPT | Performed by: ORTHOPAEDIC SURGERY

## 2025-05-27 PROCEDURE — G8417 CALC BMI ABV UP PARAM F/U: HCPCS | Performed by: ORTHOPAEDIC SURGERY

## 2025-05-27 PROCEDURE — 99282 EMERGENCY DEPT VISIT SF MDM: CPT

## 2025-05-27 PROCEDURE — 3017F COLORECTAL CA SCREEN DOC REV: CPT | Performed by: ORTHOPAEDIC SURGERY

## 2025-05-27 PROCEDURE — G8428 CUR MEDS NOT DOCUMENT: HCPCS | Performed by: ORTHOPAEDIC SURGERY

## 2025-05-27 ASSESSMENT — ENCOUNTER SYMPTOMS
EYE DISCHARGE: 0
EYE INFLAMMATION: 0
VOMITING: 0
PHOTOPHOBIA: 0
DOUBLE VISION: 0
BLURRED VISION: 0
PERI-ORBITAL EDEMA: 0
EYE ITCHING: 0
EYE REDNESS: 0
BLIND SPOTS: 0
EYE WATERING: 0

## 2025-05-27 ASSESSMENT — VISUAL ACUITY
OU: 20/50
OD: 20/100
OU: 1
OS: 20/100

## 2025-05-27 NOTE — DISCHARGE INSTRUCTIONS
No acute abnormalities were found with your eyes today  Follow-up with your doctor for recheck as needed  Return to the emergency department if worse or other concerns

## 2025-05-27 NOTE — PROGRESS NOTES
Patient ID: Jonny Westbrook is a 49 y.o. male    Chief Compliant:  No chief complaint on file.       Diagnostic imaging:        Assessment and Plan:  1. Neck pain    2. Cervical radiculopathy    3. DISH (diffuse idiopathic skeletal hyperostosis)            He is doing roughly 85% better symptomatically.  Patient wishes to have further chiropractic care and I I believe that the patient would benefit from continual chiropractic care.      If he can get the insurance company to approve if he needs I will provide an order for chiropractic care    Follow up in 6 weeks.    HPI:  This is a 49 y.o. male who presents to the clinic today for follow up evaluation of right shoulder pain.     Right shoulder pain with radiation to the right elbow and down to right hand/wrist. He states this is mostly diminished besides some pain when sitting at the computer for several hours or working all day.    Hx of both PT and chiropractic care. He is currently in PT for his left leg, unrelated to my care.      Patient had stopped physical therapy on his neck to work on his leg instead    Review of Systems   All other systems reviewed and are negative.      Past History:    Current Outpatient Medications:     doxycycline hyclate (VIBRA-TABS) 100 MG tablet, Take 1 tablet by mouth 2 times daily for 10 days, Disp: 20 tablet, Rfl: 0    predniSONE (DELTASONE) 20 MG tablet, Take 1 tablet by mouth 2 times daily for 5 days, Disp: 10 tablet, Rfl: 0    dextromethorphan-guaiFENesin (MUCINEX DM)  MG per extended release tablet, Take 1 tablet by mouth every 12 hours as needed for Congestion or Cough, Disp: 20 tablet, Rfl: 0    fluticasone (FLONASE) 50 MCG/ACT nasal spray, 1 spray by Each Nostril route daily, Disp: 48 g, Rfl: 1    Nutritional Supplements (ENSURE NUTRITION SHAKE) LIQD, Take 2 Bottles by mouth daily (NUTRITIONAL DEFICIENCY) CHOCOLATE ONLY, Disp: 30 each, Rfl: 5    Multiple Vitamin (MULTIVITAMIN) tablet, TAKE 1 TAB BY MOUTH ONCE EVERY DAY,

## 2025-05-27 NOTE — ED PROVIDER NOTES
[facility] EMERGENCY DEPARTMENT  eMERGENCY dEPARTMENT eNCOUnter      Pt Name: Jonny Westbrook  MRN: [id number]  Birthdate 1975  Date of evaluation: 5/27/25      CHIEF COMPLAINT       Chief Complaint   Patient presents with    Eye Problem     Pt states he had a bloodshot eye last month, has already been seen for it. It went away. Pt states he thinks he now has a red makr to the skin under his right eye he noticed about 3-4 days ago. He also states he thinks the \"white part of my left eye is brighter than the white park of my white eye\"         HISTORY OF PRESENT ILLNESS    Jonny Westbrook is a 49 y.o. male who presents complaining of eye issue.   The history is provided by the patient.   Eye Problem  Affected eye: He reports that his right eye is not as white as his left.  No injury no trauma no blurred vision no drainage no headache.  Progression:  Unchanged  Chronicity:  New  Context: not chemical exposure, not contact lens problem, not direct trauma, not foreign body, not scratch, not smoke exposure and not UV exposure    Relieved by:  Nothing  Worsened by:  Nothing  Ineffective treatments:  None tried  Associated symptoms: no blurred vision, no decreased vision, no discharge, no double vision, no headaches, no inflammation, no itching, no numbness, no photophobia, no redness, no scotomas, no swelling, no tearing, no tingling and no vomiting    Associated symptoms comment:  Recent dx of subconjunctival hemorrhage, has resolved.  He states the white part of his right eye is not as white as the white part of his left eye.  No injury no trauma no visual changes      REVIEW OF SYSTEMS       Review of Systems   Eyes:  Negative for blurred vision, double vision, photophobia, discharge, redness and itching.   Gastrointestinal:  Negative for vomiting.   Neurological:  Negative for tingling, numbness and headaches.   All other systems reviewed and are negative.      PAST MEDICAL HISTORY     Past Medical History:   Diagnosis

## 2025-05-27 NOTE — ED PROVIDER NOTES
eMERGENCY dEPARTMENT  Attending Physician Attestation     Pt Name: Jonny Westbrook  MRN: [id number]  Birthdate 1975  Date of evaluation: 5/27/25     Jonny Westbrook is a 49 y.o. male with CC: Eye Problem (Pt states he had a bloodshot eye last month, has already been seen for it. It went away. Pt states he thinks he now has a red makr to the skin under his right eye he noticed about 3-4 days ago. He also states he thinks the \"white part of my left eye is brighter than the white park of my white eye\")      Based on the medical record the care appears appropriate.  I was personally available for consultation in the Emergency Department.    Eber Siddiqui DO  Attending Emergency Physician                  Eber Siddiqui DO  05/27/25 [id number]

## 2025-05-30 ENCOUNTER — TELEPHONE (OUTPATIENT)
Dept: FAMILY MEDICINE CLINIC | Age: 50
End: 2025-05-30

## 2025-05-30 ENCOUNTER — OFFICE VISIT (OUTPATIENT)
Dept: FAMILY MEDICINE CLINIC | Age: 50
End: 2025-05-30

## 2025-05-30 VITALS
DIASTOLIC BLOOD PRESSURE: 70 MMHG | HEART RATE: 82 BPM | BODY MASS INDEX: 46.79 KG/M2 | TEMPERATURE: 97.3 F | WEIGHT: 315 LBS | SYSTOLIC BLOOD PRESSURE: 102 MMHG | OXYGEN SATURATION: 94 %

## 2025-05-30 DIAGNOSIS — J40 BRONCHITIS: ICD-10-CM

## 2025-05-30 DIAGNOSIS — H11.31 SUBCONJUNCTIVAL HEMORRHAGE OF RIGHT EYE: ICD-10-CM

## 2025-05-30 DIAGNOSIS — Z87.891 PERSONAL HISTORY OF TOBACCO USE: ICD-10-CM

## 2025-05-30 DIAGNOSIS — Z09 HOSPITAL DISCHARGE FOLLOW-UP: Primary | ICD-10-CM

## 2025-05-30 SDOH — ECONOMIC STABILITY: FOOD INSECURITY: WITHIN THE PAST 12 MONTHS, YOU WORRIED THAT YOUR FOOD WOULD RUN OUT BEFORE YOU GOT MONEY TO BUY MORE.: NEVER TRUE

## 2025-05-30 SDOH — ECONOMIC STABILITY: FOOD INSECURITY: WITHIN THE PAST 12 MONTHS, THE FOOD YOU BOUGHT JUST DIDN'T LAST AND YOU DIDN'T HAVE MONEY TO GET MORE.: NEVER TRUE

## 2025-05-30 ASSESSMENT — PATIENT HEALTH QUESTIONNAIRE - PHQ9
SUM OF ALL RESPONSES TO PHQ QUESTIONS 1-9: 0
1. LITTLE INTEREST OR PLEASURE IN DOING THINGS: NOT AT ALL
SUM OF ALL RESPONSES TO PHQ QUESTIONS 1-9: 0
2. FEELING DOWN, DEPRESSED OR HOPELESS: NOT AT ALL
SUM OF ALL RESPONSES TO PHQ QUESTIONS 1-9: 0
SUM OF ALL RESPONSES TO PHQ QUESTIONS 1-9: 0

## 2025-05-30 ASSESSMENT — ENCOUNTER SYMPTOMS
COUGH: 1
EYE REDNESS: 1

## 2025-05-30 NOTE — PROGRESS NOTES
Evita Mirza, [facility]  [address]  [place]  [address]  [phone]  [phone]    Jonny Westbrook is a 49 y.o. male who is here with c/o of:    Chief Complaint: Follow-up (Pt states his  eye is a little better )      Patient Accompanied by: n/a    HPI - Jonny Westbrook is here today with c/o:    History of Present Illness  The patient is a 49-year-old male who presents for follow-up after an emergency department visit.    He sought emergency care due to a red eye, which was diagnosed as a subconjunctival hemorrhage. The condition initially presented with mild redness but progressed to involve half of the eye upon awakening the following day. He was informed that the condition would resolve within 2 to 3 weeks and was advised to return if any complications arose. During a subsequent visit to his sports medicine physician at [facility], he reported persistent yellow discoloration in one eye, while the other had returned to its normal white color. He also observed redness under the eye for several days prior to his visit to [facility]. The medical team reassured him that the condition would resolve spontaneously and recommended the application of a cold compress. They also suggested a follow-up with his primary care provider on 05/29/2025.    He is currently on antibiotic therapy for bronchitis, prescribed by an urgent care facility. He has been on this regimen for approximately 6 days and reports feeling better. He has been using Flonase nasal spray as part of his treatment. He has experienced recurrent episodes of bronchitis, which he attributes to exposure from a group setting. His symptoms include postnasal drainage and coughing.    He smokes cigarettes and is interested in getting cancer screening done.    SOCIAL HISTORY  The patient is a smoker.       Health Maintenance Due   Topic Date Due    COVID-19 Vaccine (5 - 2024-25 season) 11/21/2024    Lung Cancer

## 2025-06-02 ENCOUNTER — OFFICE VISIT (OUTPATIENT)
Dept: PODIATRY | Age: 50
End: 2025-06-02
Payer: COMMERCIAL

## 2025-06-02 VITALS — BODY MASS INDEX: 40.43 KG/M2 | HEIGHT: 74 IN | WEIGHT: 315 LBS

## 2025-06-02 DIAGNOSIS — R26.2 DIFFICULTY WALKING: ICD-10-CM

## 2025-06-02 DIAGNOSIS — B35.1 DERMATOPHYTOSIS OF NAIL: Primary | ICD-10-CM

## 2025-06-02 DIAGNOSIS — M79.605 BILATERAL LOWER EXTREMITY PAIN: ICD-10-CM

## 2025-06-02 DIAGNOSIS — R60.0 EDEMA OF LOWER EXTREMITY: ICD-10-CM

## 2025-06-02 DIAGNOSIS — L60.0 INGROWN NAIL: ICD-10-CM

## 2025-06-02 DIAGNOSIS — M79.604 BILATERAL LOWER EXTREMITY PAIN: ICD-10-CM

## 2025-06-02 DIAGNOSIS — L85.3 XEROSIS CUTIS: ICD-10-CM

## 2025-06-02 PROCEDURE — G8417 CALC BMI ABV UP PARAM F/U: HCPCS | Performed by: PODIATRIST

## 2025-06-02 PROCEDURE — 99213 OFFICE O/P EST LOW 20 MIN: CPT | Performed by: PODIATRIST

## 2025-06-02 PROCEDURE — 3017F COLORECTAL CA SCREEN DOC REV: CPT | Performed by: PODIATRIST

## 2025-06-02 PROCEDURE — G8427 DOCREV CUR MEDS BY ELIG CLIN: HCPCS | Performed by: PODIATRIST

## 2025-06-02 PROCEDURE — 4004F PT TOBACCO SCREEN RCVD TLK: CPT | Performed by: PODIATRIST

## 2025-06-02 PROCEDURE — 11721 DEBRIDE NAIL 6 OR MORE: CPT | Performed by: PODIATRIST

## 2025-06-02 RX ORDER — AMMONIUM LACTATE 12 G/100G
LOTION TOPICAL
Qty: 225 G | Refills: 2 | Status: SHIPPED | OUTPATIENT
Start: 2025-06-02

## 2025-06-03 DIAGNOSIS — E29.1 HYPOGONADISM IN MALE: ICD-10-CM

## 2025-06-03 RX ORDER — TESTOSTERONE UNDECANOATE 237 MG/1
1 CAPSULE, LIQUID FILLED ORAL 2 TIMES DAILY
Qty: 60 CAPSULE | Refills: 0 | Status: SHIPPED | OUTPATIENT
Start: 2025-06-03

## 2025-06-09 NOTE — PROGRESS NOTES
[facility] PODIATRY [address]  [address]  [place] [id number]  Dept: [phone]  Dept Fax: [phone]     PAIN PROGRESS NOTE  Date of patient's visit: 6/9/2025  Patient's Name:  Jonny Westbrook YOB: 1975            Patient Care Team:  Evita Mirza APRN - CNP as PCP - General (Nurse Practitioner)  Evita Mirza APRN - CNP as PCP - Empaneled Provider  Georgiana Fagan DPM as Physician (Podiatry)  Tramaine Mckeon MD as Consulting Physician (Urology)  Pietro Navarro PA-C as Physician Assistant (Dermatology)      Chief Complaint   Patient presents with    Diabetes    Peripheral Neuropathy     Bilateral foot       Subjective:   This Jonny Westbrook comes to clinic for foot and nail care.  Pt currently has complaint of thickened, painful, elongated nails that he/she cannot manage by themselves.  Pt. Relates pain to nails with shoe gear.  Pt's primary care physician is Evita Mirza APRN - CNP last seen 1/3/25.    Pt has a new complaint of increased swelling and dry skin to michael feet..    Past Medical History:   Diagnosis Date    Arthritis     Caffeine use     2-3 cups coffee and 2-3 cups tea/day    Depression     Hypertriglyceridemia     Obesity     Schizophrenia (HCC)        Allergies   Allergen Reactions    Codeine Other (See Comments)     hallucinates      Current Outpatient Medications on File Prior to Visit   Medication Sig Dispense Refill    fluticasone (FLONASE) 50 MCG/ACT nasal spray 1 spray by Each Nostril route daily 48 g 1    Nutritional Supplements (ENSURE NUTRITION SHAKE) LIQD Take 2 Bottles by mouth daily (NUTRITIONAL DEFICIENCY) CHOCOLATE ONLY 30 each 5    Multiple Vitamin (MULTIVITAMIN) tablet TAKE 1 TAB BY MOUTH ONCE EVERY DAY 30 tablet 4    vitamin C (ASCORBIC ACID) 500 MG tablet TAKE 2 TABS BY MOUTH ONCE EVERY DAY 60 tablet 4    ammonium lactate (LAC-HYDRIN) 12 % lotion Apply topically once daily 225 g 2

## 2025-06-11 ENCOUNTER — HOSPITAL ENCOUNTER (OUTPATIENT)
Dept: CT IMAGING | Age: 50
Discharge: HOME | End: 2025-06-13
Payer: COMMERCIAL

## 2025-06-11 DIAGNOSIS — Z87.891 PERSONAL HISTORY OF TOBACCO USE: ICD-10-CM

## 2025-06-11 PROCEDURE — 71271 CT THORAX LUNG CANCER SCR C-: CPT

## 2025-06-12 ENCOUNTER — RESULTS FOLLOW-UP (OUTPATIENT)
Dept: FAMILY MEDICINE CLINIC | Age: 50
End: 2025-06-12

## 2025-06-16 ENCOUNTER — OFFICE VISIT (OUTPATIENT)
Age: 50
End: 2025-06-16
Payer: COMMERCIAL

## 2025-06-16 VITALS — BODY MASS INDEX: 40.43 KG/M2 | HEIGHT: 74 IN | WEIGHT: 315 LBS

## 2025-06-16 DIAGNOSIS — R35.1 NOCTURIA: ICD-10-CM

## 2025-06-16 DIAGNOSIS — E29.1 HYPOGONADISM IN MALE: ICD-10-CM

## 2025-06-16 DIAGNOSIS — N13.8 BPH WITH OBSTRUCTION/LOWER URINARY TRACT SYMPTOMS: Primary | ICD-10-CM

## 2025-06-16 DIAGNOSIS — N40.1 BPH WITH OBSTRUCTION/LOWER URINARY TRACT SYMPTOMS: Primary | ICD-10-CM

## 2025-06-16 LAB
BILIRUBIN, POC: NORMAL
BLOOD URINE, POC: NORMAL
CLARITY, POC: CLEAR
COLOR, POC: YELLOW
GLUCOSE URINE, POC: NORMAL MG/DL
KETONES, POC: NORMAL
LEUKOCYTE EST, POC: NORMAL
NITRITE, POC: NORMAL
PH, POC: NORMAL
PROTEIN, POC: NORMAL MG/DL
SPECIFIC GRAVITY, POC: NORMAL
UROBILINOGEN, POC: NORMAL

## 2025-06-16 PROCEDURE — 99214 OFFICE O/P EST MOD 30 MIN: CPT | Performed by: SPECIALIST

## 2025-06-16 PROCEDURE — G8427 DOCREV CUR MEDS BY ELIG CLIN: HCPCS | Performed by: SPECIALIST

## 2025-06-16 PROCEDURE — G8417 CALC BMI ABV UP PARAM F/U: HCPCS | Performed by: SPECIALIST

## 2025-06-16 PROCEDURE — 3017F COLORECTAL CA SCREEN DOC REV: CPT | Performed by: SPECIALIST

## 2025-06-16 PROCEDURE — 4004F PT TOBACCO SCREEN RCVD TLK: CPT | Performed by: SPECIALIST

## 2025-06-16 PROCEDURE — 81003 URINALYSIS AUTO W/O SCOPE: CPT | Performed by: SPECIALIST

## 2025-06-16 RX ORDER — ALFUZOSIN HYDROCHLORIDE 10 MG/1
TABLET, EXTENDED RELEASE ORAL
Qty: 30 TABLET | Refills: 5 | OUTPATIENT
Start: 2025-06-16

## 2025-06-16 RX ORDER — ALFUZOSIN HYDROCHLORIDE 10 MG/1
10 TABLET, EXTENDED RELEASE ORAL DAILY
Qty: 90 TABLET | Refills: 3 | Status: SHIPPED | OUTPATIENT
Start: 2025-06-16

## 2025-06-16 NOTE — PROGRESS NOTES
Tramaine Mckeon MD FACS    [facility] Urology Office Progress Note    Patient:  Jonny Westbrook  YOB: 1975  Date: 6/16/2025    HISTORY OF PRESENT ILLNESS:   The patient is a 49 y.o. male  Patient's lower urinary tract symptoms are stable on Alfuzosin 10 mg po qd for BPH symptoms.   Patient told to take this 1/2 hour after evening meal.  Patient wants to resume Jatenzo 237 mg po bid with food for his symptomatic Testosterone deficiency.  Will check a repeat free and total testosterone 6 hours after morning dose of Jatenzo at least 1 week after starting his medication to establish adequate levels.    Lower urinary tract symptoms: nocturia, 3 times per night   Last AUA Symptom Score (QOL): 2 (6)  Today's AUA Symptom Score (QOL): 4 (6)    Summary of old records:   Recurrent UTI: 10/22/21 Citrobacter, 9/24/23 E coli; PVR = 35 mL; 7/18/23 renal and bladder US neg, 8/2/23 uroflow: 480 mL, 24.2/16 mL/sec; declined a cystoscopy  Excessive bladder irritant consumption: 2-3 coffee/d, 2-3 tea/d, 6 pops/d; now only 2 coffee/d  Microhematuria: 9/24/23 CT wo contrast neg; 9/29/23 CT urogram neg  Hypogonadism: free and total testosterone=37/241: Androgel 1.62% 2 pumps topically qd (too expensive); Rx for Jatenzo 237 mg po bid with food. 6/10/24    Additional History: none    Procedures Today: N/A    Urinalysis today:  Results for orders placed or performed in visit on 06/16/25   POCT Urinalysis No Micro (Auto)   Result Value Ref Range    Color, UA yellow     Clarity, UA clear     Glucose, UA POC neg mg/dL    Bilirubin, UA      Ketones, UA      Spec Grav, UA      Blood, UA POC trace     pH, UA      Protein, UA POC neg mg/dL    Urobilinogen, UA      Leukocytes, UA neg     Nitrite, UA neg        Last several PSA's:  Lab Results   Component Value Date    PSA 0.38 01/27/2025    PSA 0.30 06/12/2024       Last total testosterone:  Lab Results   Component Value Date    TESTOSTERONE 241 11/13/2023       Last BUN

## 2025-06-26 ENCOUNTER — PROCEDURE VISIT (OUTPATIENT)
Age: 50
End: 2025-06-26

## 2025-06-26 VITALS
BODY MASS INDEX: 40.43 KG/M2 | WEIGHT: 315 LBS | HEIGHT: 74 IN | OXYGEN SATURATION: 95 % | TEMPERATURE: 98 F | DIASTOLIC BLOOD PRESSURE: 77 MMHG | SYSTOLIC BLOOD PRESSURE: 117 MMHG | HEART RATE: 87 BPM

## 2025-06-26 DIAGNOSIS — C44.92 KERATOACANTHOMA TYPE SQUAMOUS CELL CARCINOMA OF SKIN: Primary | ICD-10-CM

## 2025-06-26 DIAGNOSIS — E29.1 HYPOGONADISM IN MALE: ICD-10-CM

## 2025-06-26 RX ORDER — TESTOSTERONE UNDECANOATE 237 MG/1
CAPSULE, LIQUID FILLED ORAL
Qty: 60 CAPSULE | Refills: 5 | Status: SHIPPED | OUTPATIENT
Start: 2025-06-26

## 2025-06-26 NOTE — PROGRESS NOTES
Dermatology Surgery Pre-Visit Checklist    (Please complete with  Y (yes) / N (no) or explain)    Pathologic Diagnosis: Y    Photo available of surgery site in media: Y    Competent to give informed consent? Y   If not, who is authorized to do so: NA    Need for help for wound care: N   If so, who will do so: NA    Are you diagnosed:   Diabetes: N   If yes, last A1C: N       HIV: N       Hepatitis: N       Current smoker: Y  If yes, how much: N    So you have any of the following: Pacemaker: N       Defibrillator: N       Artificial Heart valve: N                Artificial Joints: N       Other Implantable Device: N       Organ Transplant: N       Other: N    Are you on blood thinners such as: Asprin: N       Warfarin/Coumadin: N       Other: N    Any allergies to the following:  Lidocaine: N       Iodine: N       Adhesive: N       Other: N

## 2025-06-26 NOTE — PROGRESS NOTES
Dermatology Procedure Note   [facility]YSICIANS [facility]  [facility] DERMATOLOGY  [address] [place] [address]  Dept: [phone]  Dept Fax: [phone]      Procedure Date: 6/26/2025  Procedure Time: 12:27 PM    Procedure Practitioner: Pietro Navarro PA-C    Procedure: Lesion Destruction    Pre-Procedure Diagnosis: Squamous Cell Carcinoma    Post-Procedure Diagnosis: Same as Pre-Procedure Diagnosis    Informed Consent: The procedure and its risks were explained including but not limited to pain, bleeding, infection, permanent scar, permanent pigment alteration and recurrence. Consent to proceed with the procedure was obtained from the patient or the parent by the practitioner    Time Out:  A time out was conducted immediately before starting the procedure that confirmed a final verification of the correct patient, correct procedure, and correct site.    Procedure Details:  Malignant Destruction: A 27 mm SCC on the right forearm was treated with 2 rounds of curettage and fulguration. After the first round the defect was 30 mm. The defect was coated in Vaseline and a bandage was applied.     Procedure Performed By: Pietro Navarro PA-C    Estimated Blood Loss: Minimal    Pathologic Specimen: none sent    Procedure Tolerance: Good    Complication(s): None    Electronically signed by Pietro Navarro PA-C on 6/26/25 at 12:27 PM EDT

## 2025-06-30 ENCOUNTER — TELEPHONE (OUTPATIENT)
Age: 50
End: 2025-06-30

## 2025-06-30 DIAGNOSIS — C44.92 KERATOACANTHOMA TYPE SQUAMOUS CELL CARCINOMA OF SKIN: Primary | ICD-10-CM

## 2025-06-30 RX ORDER — ADHESIVE BANDAGE
BANDAGE TOPICAL
Qty: 30 EACH | Refills: 5 | Status: SHIPPED | OUTPATIENT
Start: 2025-06-30 | End: 2025-07-01 | Stop reason: SDUPTHER

## 2025-06-30 RX ORDER — ADHESIVE BANDAGE 1 3/4"X4"
BANDAGE TOPICAL
COMMUNITY

## 2025-06-30 RX ORDER — ADHESIVE BANDAGE 1 3/4"X4"
BANDAGE TOPICAL
Status: CANCELLED | OUTPATIENT
Start: 2025-06-30

## 2025-06-30 NOTE — TELEPHONE ENCOUNTER
[facility] stating that they do not do prescriptions for bandaids- that is something that will have to come from [facility] if he is still going through them. If he wants them sent to [facility] he will have to call them to have the prescription transferred

## 2025-06-30 NOTE — TELEPHONE ENCOUNTER
This is a Pietro patient who had ED and C done with with him last Thursday and is calling to get a RX for Band-Aids, he states he needs the bigger size 2x3 for this \" because the hole is so big\" and that his insurance will cover for this.

## 2025-07-01 ENCOUNTER — OFFICE VISIT (OUTPATIENT)
Dept: FAMILY MEDICINE CLINIC | Age: 50
End: 2025-07-01
Payer: COMMERCIAL

## 2025-07-01 VITALS
BODY MASS INDEX: 44.04 KG/M2 | OXYGEN SATURATION: 94 % | WEIGHT: 315 LBS | TEMPERATURE: 97.7 F | DIASTOLIC BLOOD PRESSURE: 80 MMHG | SYSTOLIC BLOOD PRESSURE: 120 MMHG | HEART RATE: 90 BPM

## 2025-07-01 DIAGNOSIS — G89.29 OTHER CHRONIC PAIN: ICD-10-CM

## 2025-07-01 DIAGNOSIS — I87.2 VENOUS STASIS DERMATITIS OF BOTH LOWER EXTREMITIES: Primary | ICD-10-CM

## 2025-07-01 PROCEDURE — 99214 OFFICE O/P EST MOD 30 MIN: CPT | Performed by: NURSE PRACTITIONER

## 2025-07-01 PROCEDURE — G8417 CALC BMI ABV UP PARAM F/U: HCPCS | Performed by: NURSE PRACTITIONER

## 2025-07-01 PROCEDURE — G8427 DOCREV CUR MEDS BY ELIG CLIN: HCPCS | Performed by: NURSE PRACTITIONER

## 2025-07-01 PROCEDURE — 4004F PT TOBACCO SCREEN RCVD TLK: CPT | Performed by: NURSE PRACTITIONER

## 2025-07-01 PROCEDURE — 3017F COLORECTAL CA SCREEN DOC REV: CPT | Performed by: NURSE PRACTITIONER

## 2025-07-01 RX ORDER — ACETAMINOPHEN 500 MG
500 TABLET ORAL 4 TIMES DAILY PRN
Qty: 120 TABLET | Refills: 1 | Status: SHIPPED | OUTPATIENT
Start: 2025-07-01

## 2025-07-01 RX ORDER — ADHESIVE BANDAGE
BANDAGE TOPICAL
Qty: 30 EACH | Refills: 5 | Status: SHIPPED | OUTPATIENT
Start: 2025-07-01

## 2025-07-01 SDOH — ECONOMIC STABILITY: FOOD INSECURITY: WITHIN THE PAST 12 MONTHS, YOU WORRIED THAT YOUR FOOD WOULD RUN OUT BEFORE YOU GOT MONEY TO BUY MORE.: OFTEN TRUE

## 2025-07-01 SDOH — ECONOMIC STABILITY: FOOD INSECURITY: WITHIN THE PAST 12 MONTHS, THE FOOD YOU BOUGHT JUST DIDN'T LAST AND YOU DIDN'T HAVE MONEY TO GET MORE.: OFTEN TRUE

## 2025-07-01 NOTE — TELEPHONE ENCOUNTER
Patient would like the rx for these band aids sent to [facility] pharmacy in [place]

## 2025-07-01 NOTE — PATIENT INSTRUCTIONS
[facility] Food Resources*  (Call [facility] for more resources)    Jacky [employer] [facility] [employer]  What they offer: Food pantry second and fourth Tuesday 4:30-6pm  Phone Number and Address: [phone] Toll Free: [employer] at [employer], between [employer] and [employer]; [address] [address]  [facility] Office on Aging of [facility]  What they offer: “Meals & Nutrition” search option on website  Phone Number and Website: 276.842.2489; https://CIBDO/  [facility] Critical access hospitalé  What they offer: Dinner is open to all (must be registered and in good standing) and three hot meals a day for shelter residents. Breakfast 7-8am, Lunch 12-1pm, and Dinner 5-6pm daily.  Phone and Address: [phone]; [address] [address]  [employer] Delivery program  What they offer: Food Box Delivery for those within [facility] who are homebound or without transportation. Applications done by phone. Qualifying individuals are eligible for 3 deliveries for the lifetime of the program.  Phone number: Call for eligibility check at 2-1-1 or 5-607-606[employer] (9384)  [facility]  What they offer: Food Pantry second and fourth Saturday 1-5pm  Phone and Address: [phone]; [address] [address]  [employer] Mobile Food Pantries   What they offer: Mobile pantries throughout the [facility]. Anyone in need may visit one pantry per month.  Phone Number and Website: For locations and schedule call 2-1-1 or 0-770-540-HELP (4791) or check the website www.feedtoledo.org  [employer] [place] [employer] Charities  What they offer: Food Pantry, call for schedule, open to All  Phone Number: [phone]  Helping [facility]  What they offer: Hot meals, Breakfast: Monday, Wednesday, Friday 8-10am, Lunch: Monday-Friday 10am-12:30pm. Food pantry (serves [address] or east of [facility]) Tuesday

## 2025-07-01 NOTE — PROGRESS NOTES
Evita Mirza, [facility]  [address]  [place]  [address]  [phone]  [phone]    Jonny Westbrook is a 49 y.o. male who is here with c/o of:    Chief Complaint: Other (Pt needs forms filled out to donate blood)      Patient Accompanied by: n/a    HPI - Jonny Westbrook is here today with c/o:    History of Present Illness  The patient presents for evaluation of varicose veins and a pinched nerve.    He reports experiencing purple discoloration on his legs, which is gradually fading. There is no history of scans for his legs and no history of blood clots. He has been attending physical therapy sessions, which he finds beneficial. During one session, the therapist applied pressure to his leg, leaving a fingerprint leonardo, and subsequently, the purple discoloration on the outer part of his leg disappeared.    He also mentions a pinched nerve. Despite receiving 12 chiropractic treatments covered by his insurance, he continues to experience symptoms in his arm. He was informed that preauthorization for additional chiropractic treatments is not possible. He occasionally visits the chiropractor and reports no current pain. He describes a popping sensation in his elbow and shoulder. The pain initially originated from the middle of his back, slightly more on the right side, and then radiated down his arm to the front of his hand and elbow. He experienced numbness and tingling 2 or 3 times, each lasting a few minutes. He likens the sensation to his arm decomposing due to impaired blood flow. This was approximately 3 to 4 weeks ago. He has been managing the pain with Tylenol and requests a prescription for it.    He reports no recent illness, including no coughing, fever, shortness of breath, sore throat, diarrhea, or vomiting.       Health Maintenance Due   Topic Date Due    COVID-19 Vaccine (5 - 2024-25 season) 11/21/2024    A1C test (Diabetic or Prediabetic)  06/12/2025        Patient Active

## 2025-07-07 ENCOUNTER — TELEPHONE (OUTPATIENT)
Age: 50
End: 2025-07-07

## 2025-07-08 ENCOUNTER — OFFICE VISIT (OUTPATIENT)
Dept: ORTHOPEDIC SURGERY | Age: 50
End: 2025-07-08
Payer: COMMERCIAL

## 2025-07-08 VITALS — RESPIRATION RATE: 14 BRPM | HEIGHT: 74 IN | WEIGHT: 315 LBS | BODY MASS INDEX: 40.43 KG/M2

## 2025-07-08 DIAGNOSIS — M54.12 CERVICAL RADICULOPATHY: ICD-10-CM

## 2025-07-08 DIAGNOSIS — M54.2 NECK PAIN: Primary | ICD-10-CM

## 2025-07-08 DIAGNOSIS — M48.10 DISH (DIFFUSE IDIOPATHIC SKELETAL HYPEROSTOSIS): ICD-10-CM

## 2025-07-08 PROCEDURE — 3017F COLORECTAL CA SCREEN DOC REV: CPT | Performed by: ORTHOPAEDIC SURGERY

## 2025-07-08 PROCEDURE — G8428 CUR MEDS NOT DOCUMENT: HCPCS | Performed by: ORTHOPAEDIC SURGERY

## 2025-07-08 PROCEDURE — G8417 CALC BMI ABV UP PARAM F/U: HCPCS | Performed by: ORTHOPAEDIC SURGERY

## 2025-07-08 PROCEDURE — 99213 OFFICE O/P EST LOW 20 MIN: CPT | Performed by: ORTHOPAEDIC SURGERY

## 2025-07-08 PROCEDURE — 4004F PT TOBACCO SCREEN RCVD TLK: CPT | Performed by: ORTHOPAEDIC SURGERY

## 2025-07-08 NOTE — PROGRESS NOTES
Patient ID: Jonny Westbrook is a 49 y.o. male    Chief Compliant:  No chief complaint on file.       Diagnostic imaging:        Assessment and Plan:  1. Neck pain    2. Cervical radiculopathy    3. DISH (diffuse idiopathic skeletal hyperostosis)      Pain all but disappeared following chiropractic treatment denies numbness tingling weakness neck pain      Follow up prn    HPI:  This is a 49 y.o. male who presents to the clinic today for follow-up of right shoulder pain.     Previous right shoulder pain symptoms have diminished since last visit.    Right forearm pain. He denies this to be numbness or tingling. He reported that he was told by his chiropractor this symptom is associated with his nerves regenerating.    He has been following with chiropractic care, and has received great relief of symptoms.    Review of Systems   All other systems reviewed and are negative.      Past History:    Current Outpatient Medications:     Adhesive Bandages (BAND-AID) MISC, XL at least 2x3 inches. Apply daily to wound, Disp: 30 each, Rfl: 5    acetaminophen (TYLENOL) 500 MG tablet, Take 1 tablet by mouth 4 times daily as needed for Pain, Disp: 120 tablet, Rfl: 1    Adhesive Bandages (BAND-AID FLEXIBLE ASSORTED) MISC, by Does not apply route, Disp: , Rfl:     Testosterone Undecanoate (JATENZO) 237 MG CAPS, TAKE 1 CAPSULE BY MOUTH TWICE A DAY IN THE MORNING AND EVENING WITH FOOD, Disp: 60 capsule, Rfl: 5    alfuzosin (UROXATRAL) 10 MG extended release tablet, Take 1 tablet by mouth daily, Disp: 90 tablet, Rfl: 3    ammonium lactate (LAC-HYDRIN) 12 % lotion, Apply topically once daily, Disp: 225 g, Rfl: 2    fluticasone (FLONASE) 50 MCG/ACT nasal spray, 1 spray by Each Nostril route daily, Disp: 48 g, Rfl: 1    Nutritional Supplements (ENSURE NUTRITION SHAKE) LIQD, Take 2 Bottles by mouth daily (NUTRITIONAL DEFICIENCY) CHOCOLATE ONLY, Disp: 30 each, Rfl: 5    Multiple Vitamin (MULTIVITAMIN) tablet, TAKE 1 TAB BY MOUTH ONCE EVERY DAY,